# Patient Record
Sex: FEMALE | Race: WHITE | NOT HISPANIC OR LATINO | Employment: FULL TIME | URBAN - METROPOLITAN AREA
[De-identification: names, ages, dates, MRNs, and addresses within clinical notes are randomized per-mention and may not be internally consistent; named-entity substitution may affect disease eponyms.]

---

## 2017-02-05 ENCOUNTER — OFFICE VISIT (OUTPATIENT)
Dept: URGENT CARE | Facility: CLINIC | Age: 22
End: 2017-02-05
Payer: COMMERCIAL

## 2017-02-05 VITALS
BODY MASS INDEX: 28.07 KG/M2 | WEIGHT: 143 LBS | TEMPERATURE: 96.2 F | RESPIRATION RATE: 12 BRPM | HEART RATE: 89 BPM | HEIGHT: 60 IN | OXYGEN SATURATION: 96 % | SYSTOLIC BLOOD PRESSURE: 110 MMHG | DIASTOLIC BLOOD PRESSURE: 80 MMHG

## 2017-02-05 DIAGNOSIS — R10.84 GENERALIZED ABDOMINAL PAIN: ICD-10-CM

## 2017-02-05 DIAGNOSIS — K59.01 SLOW TRANSIT CONSTIPATION: ICD-10-CM

## 2017-02-05 DIAGNOSIS — R11.0 NAUSEA: ICD-10-CM

## 2017-02-05 LAB
APPEARANCE UR: CLEAR
BILIRUB UR STRIP-MCNC: NORMAL MG/DL
COLOR UR AUTO: YELLOW
GLUCOSE UR STRIP.AUTO-MCNC: NORMAL MG/DL
KETONES UR STRIP.AUTO-MCNC: NORMAL MG/DL
LEUKOCYTE ESTERASE UR QL STRIP.AUTO: NORMAL
NITRITE UR QL STRIP.AUTO: NORMAL
PH UR STRIP.AUTO: 6 [PH] (ref 5–8)
PROT UR QL STRIP: NORMAL MG/DL
RBC UR QL AUTO: NORMAL
SP GR UR STRIP.AUTO: 1.02
UROBILINOGEN UR STRIP-MCNC: 0.2 MG/DL

## 2017-02-05 PROCEDURE — 81002 URINALYSIS NONAUTO W/O SCOPE: CPT | Performed by: NURSE PRACTITIONER

## 2017-02-05 PROCEDURE — 99214 OFFICE O/P EST MOD 30 MIN: CPT | Performed by: NURSE PRACTITIONER

## 2017-02-05 RX ORDER — METOCLOPRAMIDE 10 MG/1
10 TABLET ORAL
Qty: 15 TAB | Refills: 0 | Status: SHIPPED | OUTPATIENT
Start: 2017-02-05 | End: 2019-12-20

## 2017-02-05 ASSESSMENT — ENCOUNTER SYMPTOMS
DIARRHEA: 0
NUMBER OF EPISODES OF EMESIS TODAY: 1
ABDOMINAL PAIN: 1
HEADACHES: 0
WEIGHT LOSS: 0
ANOREXIA: 0
VOMITING: 1
FEVER: 0
FLATUS: 0
MYALGIAS: 0
NAUSEA: 1
BELCHING: 0
HEMATOCHEZIA: 0
CONSTIPATION: 1

## 2017-02-05 ASSESSMENT — PAIN SCALES - GENERAL: PAINLEVEL: 6=MODERATE PAIN

## 2017-02-05 NOTE — MR AVS SNAPSHOT
Kaidenfarzadlaureen Beata MahinRaquel   2017 9:25 AM   Office Visit   MRN: [id number]    Department:  [facility]   Dept Phone:  [phone]    Description:  Female : 1995   Provider:  JONAS Rader           Reason for Visit     Abdominal Pain x 1 week / lower abn pain    Emesis x 1 day / w/ blood      Allergies as of 2017     No Known Allergies      You were diagnosed with     Slow transit constipation   [564.01.ICD-9-CM]       Nausea   [413413]       Generalized abdominal pain   [152227]         Vital Signs     Blood Pressure Pulse Temperature Respirations Height Weight    110/80 mmHg 89 35.7 °C (96.2 °F) 12 1.524 m (5') 64.864 kg (143 lb)    Body Mass Index Oxygen Saturation Last Menstrual Period Breastfeeding? Smoking Status       27.93 kg/m2 96% 2017 No Never Smoker        Basic Information     Date Of Birth Sex Race Ethnicity Preferred Language    1995 Female White,  or  Non- English      Problem List              ICD-10-CM Priority Class Noted - Resolved    Intractable migraine without status migrainosus G43.919   2015 - Present    History of colonoscopy Z98.890   2015 - Present    Depression F32.9   2015 - Present    History of self-harm Z91.5   2015 - Present    Marijuana use F12.10   2015 - Present      Health Maintenance        Date Due Completion Dates    IMM INFLUENZA (1) 2017 (Originally 2016) 10/23/2015 (Declined)    Override on 10/23/2015: Patient Declined    PAP SMEAR 2018    IMM DTaP/Tdap/Td Vaccine (6 - Td) 2021, 5/10/2006, 1996, 1995, 1995, 1995            Results     POCT Urinalysis      Component Value Standard Range & Units    POC Color yellow Negative    POC Appearance clear Negative    POC Leukocyte Esterase neg Negative    POC Nitrites neg Negative    POC Urobiligen 0.2 Negative (0.2) mg/dL    POC Protein neg Negative mg/dL    POC  Urine PH 6.0 5.0 - 8.0    POC Blood sm Negative    POC Specific Gravity 1.020 <1.005 - >1.030    POC Ketones tr Negative mg/dL    POC Biliruben neg Negative mg/dL    POC Glucose neg Negative mg/dL                        Current Immunizations     Dtap Vaccine 1/30/1996, 4/24/1995, 2/22/1995, 1994    HIB Vaccine (ACTHIB/HIBERIX) 1/30/1996, 4/24/1995, 2/22/1995, 1994    HPV Quadrivalent Vaccine (GARDASIL) 8/23/2011, 7/17/2007, 5/22/2007    Hepatitis A Vaccine, Ped/Adol 3/7/2007, 4/25/1999    Hepatitis B Vaccine Recombivax (Adol/Adult) 11/17/2006, 7/25/1995, 1994    IPV 11/17/2006    MMR Vaccine 11/17/2006, 10/28/1995    Meningococcal Conjugate Vaccine MCV4 (Menveo) 8/23/2011    OPV - Historical Data 4/24/1995, 2/22/1995, 1994    TD Vaccine 5/10/2006    Tdap Vaccine 8/23/2011    Varicella Vaccine Live 7/17/2007, 11/17/2006      Below and/or attached are the medications your provider expects you to take. Review all of your home medications and newly ordered medications with your provider and/or pharmacist. Follow medication instructions as directed by your provider and/or pharmacist. Please keep your medication list with you and share with your provider. Update the information when medications are discontinued, doses are changed, or new medications (including over-the-counter products) are added; and carry medication information at all times in the event of emergency situations     Allergies:  No Known Allergies          Medications  Valid as of: February 05, 2017 - 11:42 AM    Generic Name Brand Name Tablet Size Instructions for use    FLUoxetine HCl (Cap) PROZAC 20 MG TAKE 1 CAPSULE BY MOUTH EVERY DAY        Levonorgestrel-Ethinyl Estrad (Tab) AVIANE, ALEJOVANE, LESSINA 0.1-20 MG-MCG Take 1 Tab by mouth every day.        Metoclopramide HCl (Tab) REGLAN 10 MG Take 1 Tab by mouth 3 times a day before meals.        .                 Medicines prescribed today were sent to:     [facility]  [id number] - [place], [place] - [address] AT [facility] [address]    [address] [place] [address]    Phone: [phone] Fax: [phone]    Open 24 Hours?: No    [id number] - [place], [place] - [address] AT [facility] [address]    [address] [facility] [address] [place] [address]    Phone: [phone] Fax: [phone]    Open 24 Hours?: Yes      Medication refill instructions:       If your prescription bottle indicates you have medication refills left, it is not necessary to call your provider’s office. Please contact your pharmacy and they will refill your medication.    If your prescription bottle indicates you do not have any refills left, you may request refills at any time through one of the following ways: The online OpenZine system (except Urgent Care), by calling your provider’s office, or by asking your pharmacy to contact your provider’s office with a refill request. Medication refills are processed only during regular business hours and may not be available until the next business day. Your provider may request additional information or to have a follow-up visit with you prior to refilling your medication.   *Please Note: Medication refills are assigned a new Rx number when refilled electronically. Your pharmacy may indicate that no refills were authorized even though a new prescription for the same medication is available at the pharmacy. Please request the medicine by name with the pharmacy before contacting your provider for a refill.           Friendferhart Status: Patient Declined

## 2017-02-05 NOTE — PROGRESS NOTES
Subjective:      Sulema Castorena is a 21 y.o. female who presents with Abdominal Pain and Emesis    Chief Complaint   Patient presents with   • Abdominal Pain     x 1 week / lower abn pain   • Emesis     x 1 day / w/ blood       Denies past medical, surgical or family history that is significant to today's problem.   RX or OTC medications reviewed with patient today.   No Known Allergies      LMP: 1/28/17 and completed 2 days ago.   Lutera OCP    Abdominal Pain  This is a new problem. The current episode started in the past 7 days. The onset quality is gradual. The problem occurs intermittently. The problem has been unchanged. The pain is located in the generalized abdominal region. The pain is at a severity of 3/10. The pain is mild. The quality of the pain is colicky, aching and a sensation of fullness. The abdominal pain does not radiate. Associated symptoms include constipation, nausea and vomiting (Twice with blood tinged mucous). Pertinent negatives include no anorexia, belching, diarrhea, dysuria, fever, flatus, frequency, headaches, hematochezia, hematuria, melena, myalgias or weight loss. Nothing aggravates the pain. The pain is relieved by nothing. Treatments tried: laxative last night without effect at this time. The treatment provided no relief. There is no history of abdominal surgery, gallstones or pancreatitis. history of previous GI problems she had a colonoscopy and endoscopy as a teenager that was normal. She has has chronic intermittent, constipation   Emesis  Associated symptoms include abdominal pain, nausea and vomiting (Twice with blood tinged mucous). Pertinent negatives include no anorexia, fever, headaches or myalgias.       Review of Systems   Constitutional: Negative for fever and weight loss.   Gastrointestinal: Positive for nausea, vomiting (Twice with blood tinged mucous), abdominal pain and constipation. Negative for diarrhea, melena, hematochezia, anorexia and flatus.    Genitourinary: Negative for dysuria, frequency and hematuria.   Musculoskeletal: Negative for myalgias.   Neurological: Negative for headaches.          Objective:     /80 mmHg  Pulse 89  Temp(Src) 35.7 °C (96.2 °F)  Resp 12  Ht 1.524 m (5')  Wt 64.864 kg (143 lb)  BMI 27.93 kg/m2  SpO2 96%  LMP 02/03/2017  Breastfeeding? No     Physical Exam   Constitutional: She is oriented to person, place, and time. She appears well-developed and well-nourished.   HENT:   Head: Normocephalic.   Mouth/Throat: Uvula is midline and mucous membranes are normal. Posterior oropharyngeal erythema present.   Eyes: Pupils are equal, round, and reactive to light.   Neck: Normal range of motion.   Cardiovascular: Normal rate, regular rhythm and normal heart sounds.    Pulmonary/Chest: Effort normal and breath sounds normal. No respiratory distress.   Abdominal: Normal appearance. She exhibits no distension, no pulsatile liver, no fluid wave, no pulsatile midline mass and no mass. Bowel sounds are decreased. There is no hepatosplenomegaly. There is tenderness in the epigastric area, left upper quadrant and left lower quadrant. There is no rigidity, no rebound, no guarding, no CVA tenderness, no tenderness at McBurney's point and negative Tidwell's sign. No hernia. Hernia confirmed negative in the ventral area.       Musculoskeletal: Normal range of motion.   Neurological: She is alert and oriented to person, place, and time.   Skin: Skin is warm, dry and intact.   Psychiatric: She has a normal mood and affect. Her behavior is normal. Judgment and thought content normal.   Nursing note and vitals reviewed.         UA:  Negative       Assessment/Plan:       1. Slow transit constipation  metoclopramide (REGLAN) 10 MG Tab    POCT Urinalysis   2. Nausea  metoclopramide (REGLAN) 10 MG Tab    POCT Urinalysis   3. Generalized abdominal pain         Restart Miralax OTC Dosage and directions per   Keep well hydrated.    [place] easy to digest diet until symptoms resolve then high fiber diet.   Return to clinic or PCP 2-3 days if current symptoms are not resolving in a satisfactory manner or sooner if new or worsening symptoms occur.   Patient and or family advised differential diagnoses, signs and symptoms which would warrant Emergency Department evaluation.  Verbalizes understanding of instructions.   Pt education done. Aftercare instructions given to pt/ caregiver. Questions answered. Verbalizes good understanding.   Educated in proper administration of medication(s) ordered today including safety, possible SE, risks, benefits, rationale and alternatives to therapy.

## 2017-04-13 ENCOUNTER — OFFICE VISIT (OUTPATIENT)
Dept: MEDICAL GROUP | Facility: MEDICAL CENTER | Age: 22
End: 2017-04-13
Payer: COMMERCIAL

## 2017-04-13 VITALS
WEIGHT: 140 LBS | DIASTOLIC BLOOD PRESSURE: 64 MMHG | BODY MASS INDEX: 27.48 KG/M2 | TEMPERATURE: 99 F | SYSTOLIC BLOOD PRESSURE: 118 MMHG | HEART RATE: 102 BPM | OXYGEN SATURATION: 98 % | HEIGHT: 60 IN

## 2017-04-13 DIAGNOSIS — F51.01 PRIMARY INSOMNIA: ICD-10-CM

## 2017-04-13 DIAGNOSIS — F32.A ANXIETY AND DEPRESSION: ICD-10-CM

## 2017-04-13 DIAGNOSIS — F12.90 MARIJUANA USE: ICD-10-CM

## 2017-04-13 DIAGNOSIS — F41.9 ANXIETY AND DEPRESSION: ICD-10-CM

## 2017-04-13 PROCEDURE — 99214 OFFICE O/P EST MOD 30 MIN: CPT | Performed by: NURSE PRACTITIONER

## 2017-04-13 RX ORDER — ESCITALOPRAM OXALATE 10 MG/1
10 TABLET ORAL DAILY
Qty: 30 TAB | Refills: 1 | Status: SHIPPED | OUTPATIENT
Start: 2017-04-13 | End: 2017-06-11 | Stop reason: SDUPTHER

## 2017-04-13 RX ORDER — TRAZODONE HYDROCHLORIDE 50 MG/1
50 TABLET ORAL
Qty: 30 TAB | Refills: 1 | Status: SHIPPED | OUTPATIENT
Start: 2017-04-13 | End: 2017-06-10 | Stop reason: SDUPTHER

## 2017-04-13 ASSESSMENT — PATIENT HEALTH QUESTIONNAIRE - PHQ9
5. POOR APPETITE OR OVEREATING: 3 - NEARLY EVERY DAY
CLINICAL INTERPRETATION OF PHQ2 SCORE: 5
SUM OF ALL RESPONSES TO PHQ QUESTIONS 1-9: 23

## 2017-04-13 NOTE — MR AVS SNAPSHOT
Sulema Castorena   2017 4:00 PM   Office Visit   MRN: [id number]    Department:  [facility]   Dept Phone:  [phone]    Description:  Female : 1995   Provider:  ROSAURA Driver           Reason for Visit     Follow-Up depression meds       Allergies as of 2017     No Known Allergies      You were diagnosed with     Anxiety and depression   [325373]       Primary insomnia   [832407]         Vital Signs     Blood Pressure Pulse Temperature Height Weight Body Mass Index    118/64 mmHg 102 37.2 °C (99 °F) 1.524 m (5') 63.504 kg (140 lb) 27.34 kg/m2    Oxygen Saturation Smoking Status                98% Never Smoker           Basic Information     Date Of Birth Sex Race Ethnicity Preferred Language    1995 Female White,  or  Non- English      Problem List              ICD-10-CM Priority Class Noted - Resolved    Intractable migraine without status migrainosus G43.919   2015 - Present    History of colonoscopy Z98.890   2015 - Present    Anxiety and depression F41.9, F32.9   2015 - Present    History of self-harm Z91.5   2015 - Present    Marijuana use F12.10   2015 - Present      Health Maintenance        Date Due Completion Dates    PAP SMEAR 2018    IMM DTaP/Tdap/Td Vaccine (6 - Td) 2021, 5/10/2006, 1996, 1995, 1995, 1994            Current Immunizations     Dtap Vaccine 1996, 1995, 1995, 1994    HIB Vaccine (ACTHIB/HIBERIX) 1996, 1995, 1995, 1994    HPV Quadrivalent Vaccine (GARDASIL) 2011, 2007, 2007    Hepatitis A Vaccine, Ped/Adol 3/7/2007, 1999    Hepatitis B Vaccine Recombivax (Adol/Adult) 2006, 1995, 1994    IPV 2006    MMR Vaccine 2006, 10/28/1995    Meningococcal Conjugate Vaccine MCV4 (Menveo) 2011    OPV - Historical Data 1995, 1995, 1994    TD Vaccine 5/10/2006    Tdap Vaccine 8/23/2011    Varicella Vaccine Live 7/17/2007, 11/17/2006      Below and/or attached are the medications your provider expects you to take. Review all of your home medications and newly ordered medications with your provider and/or pharmacist. Follow medication instructions as directed by your provider and/or pharmacist. Please keep your medication list with you and share with your provider. Update the information when medications are discontinued, doses are changed, or new medications (including over-the-counter products) are added; and carry medication information at all times in the event of emergency situations     Allergies:  No Known Allergies          Medications  Valid as of: April 13, 2017 -  4:27 PM    Generic Name Brand Name Tablet Size Instructions for use    Escitalopram Oxalate (Tab) LEXAPRO 10 MG Take 1 Tab by mouth every day.        FLUoxetine HCl (Cap) PROZAC 20 MG TAKE 1 CAPSULE BY MOUTH EVERY DAY        Levonorgestrel-Ethinyl Estrad (Tab) AVIANE, ALESSE, LESSINA 0.1-20 MG-MCG Take 1 Tab by mouth every day.        Metoclopramide HCl (Tab) REGLAN 10 MG Take 1 Tab by mouth 3 times a day before meals.        TraZODone HCl (Tab) DESYREL 50 MG Take 1 Tab by mouth at bedtime as needed for Sleep.        .                 Medicines prescribed today were sent to:     [facility] [address] [place], [place] - [address] AT [facility] [address]    [address] [facility] [address]    Phone: [phone] Fax: [phone]    Open 24 Hours?: No    [facility] [address], [place] - [address] AT [facility] [address]    [address] [facility] [address]    Phone: [phone] Fax: [phone]    Open 24 Hours?: Yes      Medication refill instructions:       If your prescription bottle indicates you have medication refills left, it is not necessary to call your provider’s office. Please contact your pharmacy and they will refill your medication.     If your prescription bottle indicates you do not have any refills left, you may request refills at any time through one of the following ways: The online RealGravity system (except Urgent Care), by calling your provider’s office, or by asking your pharmacy to contact your provider’s office with a refill request. Medication refills are processed only during regular business hours and may not be available until the next business day. Your provider may request additional information or to have a follow-up visit with you prior to refilling your medication.   *Please Note: Medication refills are assigned a new Rx number when refilled electronically. Your pharmacy may indicate that no refills were authorized even though a new prescription for the same medication is available at the pharmacy. Please request the medicine by name with the pharmacy before contacting your provider for a refill.           RealGravity Access Code: [email or web address]  Expires: 5/13/2017  4:20 PM    RealGravity  A secure, online tool to manage your health information     [employer]’s RealGravity® is a secure, online tool that connects you to your personalized health information from the privacy of your home -- day or night - making it very easy for you to manage your healthcare. Once the activation process is completed, you can even access your medical information using the RealGravity emma, which is available for free in the Apple Emma store or Google Play store.     RealGravity provides the following levels of access (as shown below):   My Chart Features   [facility] Doctor [facility]  Specialists [facility]  Urgent  Care [facility]  Primary Care  Doctor   Email your healthcare team securely and privately 24/7 X X X    Manage appointments: schedule your next appointment; view details of past/upcoming appointments X      Request prescription refills. X      View recent personal medical records, including lab and immunizations X X X X   View health record, including  health history, allergies, medications X X X X   Read reports about your outpatient visits, procedures, consult and ER notes X X X X   See your discharge summary, which is a recap of your hospital and/or ER visit that includes your diagnosis, lab results, and care plan. X X       How to register for Transgenomic:  1. Go to  [email or web address].  2. Click on the Sign Up Now box, which takes you to the New Member Sign Up page. You will need to provide the following information:  a. Enter your Transgenomic Access Code exactly as it appears at the top of this page. (You will not need to use this code after you’ve completed the sign-up process. If you do not sign up before the expiration date, you must request a new code.)   b. Enter your date of birth.   c. Enter your home email address.   d. Click Submit, and follow the next screen’s instructions.  3. Create a Transgenomic ID. This will be your Transgenomic login ID and cannot be changed, so think of one that is secure and easy to remember.  4. Create a [employer] Softwaret password. You can change your password at any time.  5. Enter your Password Reset Question and Answer. This can be used at a later time if you forget your password.   6. Enter your e-mail address. This allows you to receive e-mail notifications when new information is available in Transgenomic.  7. Click Sign Up. You can now view your health information.    For assistance activating your Transgenomic account, call [phone]

## 2017-04-14 ENCOUNTER — TELEPHONE (OUTPATIENT)
Dept: MEDICAL GROUP | Facility: MEDICAL CENTER | Age: 22
End: 2017-04-14

## 2017-04-14 NOTE — PROGRESS NOTES
Subjective:     Chief Complaint   Patient presents with   • Follow-Up     depression shahla Leone Beata Castorena is a 21 y.o. female here today to follow up on:    Anxiety and depression  On prozac at one point. Began feeling it was making her mood worse, discontinued several months ago  Now with significant increase in depression and anxiety. Not sleeping, no appetite, frequently worried and tearful. No self injury, no suicidal intent  Depression Screening    Little interest or pleasure in doing things?  3 - nearly every day  Feeling down, depressed , or hopeless? 2 - more than half the days  Trouble falling or staying asleep, or sleeping too much?  2 - more than half the days  Feeling tired or having little energy?  3 - nearly every day  Poor appetite or overeating?  3 - nearly every day  Feeling bad about yourself - or that you are a failure or have let yourself or your family down? 3 - nearly every day  Trouble concentrating on things, such as reading the newspaper or watching television? 3 - nearly every day  Moving or speaking so slowly that other people could have noticed.  Or the opposite - being so fidgety or restless that you have been moving around a lot more than usual?  2 - more than half the days  Thoughts that you would be better off dead, or of hurting yourself?  2 - more than half the days  Patient Health Questionnaire Score: 23      If depressive symptoms identified deferred to follow up visit unless specifically addressed in assesment and plan.      Interpretation of PHQ-9 Total Score   Score Severity   1-4 Minimal Depression   5-9 Mild Depression   10-14 Moderate Depression   15-19 Moderately Severe Depression   20-27 Severe Depression      Marijuana use  Continues with daily marijuana use       Current medicines (including changes today)  Current Outpatient Prescriptions   Medication Sig Dispense Refill   • escitalopram (LEXAPRO) 10 MG Tab Take 1 Tab by mouth every day. 30 Tab 1   •  trazodone (DESYREL) 50 MG Tab Take 1 Tab by mouth at bedtime as needed for Sleep. 30 Tab 1   • levonorgestrel-ethinyl estradiol (LUTERA) 0.1-20 MG-MCG per tablet Take 1 Tab by mouth every day. 28 Tab 11   • metoclopramide (REGLAN) 10 MG Tab Take 1 Tab by mouth 3 times a day before meals. 15 Tab 0   • fluoxetine (PROZAC) 20 MG Cap TAKE 1 CAPSULE BY MOUTH EVERY DAY 30 Cap 2     No current facility-administered medications for this visit.     She  has a past medical history of Depression. She also has no past medical history of Diabetes or ASTHMA.    ROS included above     Objective:     Blood pressure 118/64, pulse 102, temperature 37.2 °C (99 °F), height 1.524 m (5'), weight 63.504 kg (140 lb), SpO2 98 %. Body mass index is 27.34 kg/(m^2).     Physical Exam:  General: Alert, oriented in no acute distress.  Eye contact is good, speech is normal, affect calm  Lungs: clear to auscultation bilaterally, normal effort, no wheeze/ rhonchi/ rales.  CV: regular rate and rhythm, S1, S2, no murmur  Abdomen: soft, nontender  Ext: no edema, color normal, vascularity normal, temperature normal    Assessment and Plan:   The following treatment plan was discussed   1. Anxiety and depression  Previously on prozac, felt her mood was worsening and discontinued. Now with increased symptoms. Start:  escitalopram (LEXAPRO) 10 MG Tab  Risks, benefits, SE reviewed, Discussed need for sustained treatment to obtain results. Enc counseling, she is unsure she can afford the copays. Seek emergency treatment for worsening depression or suicidal ideation    Patient has been identified as being depressed and appropriate orders and counseling have been given   2. Primary insomnia  Start trial of:  trazodone (DESYREL) 50 MG Tab   3. Marijuana use  Enc cessation. discussed marijuana use can worsen anxiety/ depression       Followup: Return in about 1 month (around 5/13/2017).         Please note that this dictation was created using voice recognition  software. I have worked with consultants from the vendor as well as technical experts from [facility] to optimize the interface. I have made every reasonable attempt to correct obvious errors, but I expect that there are errors of grammar and possibly content that I did not discover before finalizing the note.

## 2017-04-14 NOTE — TELEPHONE ENCOUNTER
These medications are frequently used together and have a very low risk of causing problem. It is more likely that her symptoms are anxiety related. However, if she is worried about continuing both I would recommend continuing the Lexapro and using over-the-counter Benadryl to help with sleep if needed.

## 2017-04-14 NOTE — ASSESSMENT & PLAN NOTE
On prozac at one point. Began feeling it was making her mood worse, discontinued several months ago  Now with significant increase in depression and anxiety. Not sleeping, no appetite, frequently worried and tearful. No self injury, no suicidal intent  Depression Screening    Little interest or pleasure in doing things?  3 - nearly every day  Feeling down, depressed , or hopeless? 2 - more than half the days  Trouble falling or staying asleep, or sleeping too much?  2 - more than half the days  Feeling tired or having little energy?  3 - nearly every day  Poor appetite or overeating?  3 - nearly every day  Feeling bad about yourself - or that you are a failure or have let yourself or your family down? 3 - nearly every day  Trouble concentrating on things, such as reading the newspaper or watching television? 3 - nearly every day  Moving or speaking so slowly that other people could have noticed.  Or the opposite - being so fidgety or restless that you have been moving around a lot more than usual?  2 - more than half the days  Thoughts that you would be better off dead, or of hurting yourself?  2 - more than half the days  Patient Health Questionnaire Score: 23      If depressive symptoms identified deferred to follow up visit unless specifically addressed in assesment and plan.      Interpretation of PHQ-9 Total Score   Score Severity   1-4 Minimal Depression   5-9 Mild Depression   10-14 Moderate Depression   15-19 Moderately Severe Depression   20-27 Severe Depression

## 2017-04-14 NOTE — TELEPHONE ENCOUNTER
1. Caller Name: Sulema StocktonLeidaJorgito                                         Call Back Number: [phone]      Patient approves a detailed voicemail message: yes    Pt called and states she took both of her medications last night before bed. Pt woke up this morning in hot flash type sweats and a racing heart. Pt is worried about taking these together after doing research on the intranet and would like advise.

## 2017-04-15 NOTE — TELEPHONE ENCOUNTER
Spoke to Pt, she is going to try to take half of the Trazodone to get use to it and continue with the Lexapro as planned. Will notify office if any further issues or concerns.

## 2017-05-07 ENCOUNTER — OFFICE VISIT (OUTPATIENT)
Dept: URGENT CARE | Facility: CLINIC | Age: 22
End: 2017-05-07
Payer: COMMERCIAL

## 2017-05-07 DIAGNOSIS — T26.42XA: ICD-10-CM

## 2017-05-07 DIAGNOSIS — H01.004 BLEPHARITIS OF LEFT UPPER EYELID, UNSPECIFIED TYPE: ICD-10-CM

## 2017-05-07 PROCEDURE — 99214 OFFICE O/P EST MOD 30 MIN: CPT | Performed by: PHYSICIAN ASSISTANT

## 2017-05-07 RX ORDER — PREDNISONE 20 MG/1
20 TABLET ORAL DAILY
Qty: 4 TAB | Refills: 0 | Status: SHIPPED | OUTPATIENT
Start: 2017-05-07 | End: 2017-05-11

## 2017-05-07 RX ORDER — SULFAMETHOXAZOLE AND TRIMETHOPRIM 800; 160 MG/1; MG/1
1 TABLET ORAL 2 TIMES DAILY
Qty: 14 TAB | Refills: 0 | Status: SHIPPED | OUTPATIENT
Start: 2017-05-07 | End: 2017-05-14

## 2017-05-07 ASSESSMENT — ENCOUNTER SYMPTOMS
DOUBLE VISION: 0
BLURRED VISION: 0
EYE REDNESS: 0
EYE BURN: 1
EYE DISCHARGE: 0
FOREIGN BODY SENSATION: 0
CONSTITUTIONAL NEGATIVE: 1
EYE PAIN: 1
PHOTOPHOBIA: 0

## 2017-05-07 NOTE — MR AVS SNAPSHOT
Kaidenfarzadlaureen Beata StocktonLeidaBull   2017 10:15 AM   Office Visit   MRN: [id number]    Department:  [facility]   Dept Phone:  [phone]    Description:  Female : 1995   Provider:  Cyrus Ruiz PA-C           Reason for Visit     Eye Burn with wax      Allergies as of 2017     No Known Allergies      You were diagnosed with     Blepharitis of left upper eyelid, unspecified type   [2418042]       Burn of eye, first degree, left, initial encounter   [[id number]]         Vital Signs     Smoking Status                   Never Smoker            Basic Information     Date Of Birth Sex Race Ethnicity Preferred Language    1995 Female White,  or  Non- English      Your appointments     May 12, 2017 11:00 AM   Established Patient with ROSAURA Driver   [facility] ([facility])    [address]  [facility] [address] [phone]           You will be receiving a confirmation call a few days before your appointment from our automated call confirmation system.              Problem List              ICD-10-CM Priority Class Noted - Resolved    Intractable migraine without status migrainosus G43.919   2015 - Present    History of colonoscopy Z98.890   2015 - Present    Anxiety and depression F41.9, F32.9   2015 - Present    History of self-harm Z91.5   2015 - Present    Marijuana use F12.10   2015 - Present      Health Maintenance        Date Due Completion Dates    PAP SMEAR 2018    IMM DTaP/Tdap/Td Vaccine (6 - Td) 2021, 5/10/2006, 1996, 1995, 1995, 1994            Current Immunizations     Dtap Vaccine 1996, 1995, 1995, 1994    HIB Vaccine (ACTHIB/HIBERIX) 1996, 1995, 1995, 1994    HPV Quadrivalent Vaccine (GARDASIL) 2011, 2007, 2007    Hepatitis A Vaccine, Ped/Adol 3/7/2007, 1999    Hepatitis B  Vaccine Recombivax (Adol/Adult) 11/17/2006, 7/25/1995, 1994    IPV 11/17/2006    MMR Vaccine 11/17/2006, 10/28/1995    Meningococcal Conjugate Vaccine MCV4 (Menveo) 8/23/2011    OPV - Historical Data 4/24/1995, 2/22/1995, 1994    TD Vaccine 5/10/2006    Tdap Vaccine 8/23/2011    Varicella Vaccine Live 7/17/2007, 11/17/2006      Below and/or attached are the medications your provider expects you to take. Review all of your home medications and newly ordered medications with your provider and/or pharmacist. Follow medication instructions as directed by your provider and/or pharmacist. Please keep your medication list with you and share with your provider. Update the information when medications are discontinued, doses are changed, or new medications (including over-the-counter products) are added; and carry medication information at all times in the event of emergency situations     Allergies:  No Known Allergies          Medications  Valid as of: May 07, 2017 - 11:26 AM    Generic Name Brand Name Tablet Size Instructions for use    Escitalopram Oxalate (Tab) LEXAPRO 10 MG Take 1 Tab by mouth every day.        FLUoxetine HCl (Cap) PROZAC 20 MG TAKE 1 CAPSULE BY MOUTH EVERY DAY        Levonorgestrel-Ethinyl Estrad (Tab) GLENN LANE LESSINA 0.1-20 MG-MCG Take 1 Tab by mouth every day.        Metoclopramide HCl (Tab) REGLAN 10 MG Take 1 Tab by mouth 3 times a day before meals.        PredniSONE (Tab) DELTASONE 20 MG Take 1 Tab by mouth every day for 4 days.        Silver Sulfadiazine (Cream) SILVADENE 1 % Apply to skin burn area bid        Sulfamethoxazole-Trimethoprim (Tab) BACTRIM -160 MG Take 1 Tab by mouth 2 times a day for 7 days.        TraZODone HCl (Tab) DESYREL 50 MG Take 1 Tab by mouth at bedtime as needed for Sleep.        .                 Medicines prescribed today were sent to:     [id number] - [place], [place] - [address] [facility]    [address]  [place] [address]    Phone: [phone] Fax: [phone]    Open 24 Hours?: Yes      Medication refill instructions:       If your prescription bottle indicates you have medication refills left, it is not necessary to call your provider’s office. Please contact your pharmacy and they will refill your medication.    If your prescription bottle indicates you do not have any refills left, you may request refills at any time through one of the following ways: The online Telanetix system (except Urgent Care), by calling your provider’s office, or by asking your pharmacy to contact your provider’s office with a refill request. Medication refills are processed only during regular business hours and may not be available until the next business day. Your provider may request additional information or to have a follow-up visit with you prior to refilling your medication.   *Please Note: Medication refills are assigned a new Rx number when refilled electronically. Your pharmacy may indicate that no refills were authorized even though a new prescription for the same medication is available at the pharmacy. Please request the medicine by name with the pharmacy before contacting your provider for a refill.           Telanetix Access Code: [email or web address]  Expires: 5/13/2017  4:20 PM    Telanetix  A secure, online tool to manage your health information     [employer]’s Telanetix® is a secure, online tool that connects you to your personalized health information from the privacy of your home -- day or night - making it very easy for you to manage your healthcare. Once the activation process is completed, you can even access your medical information using the Telanetix emma, which is available for free in the Apple Emma store or Google Play store.     Telanetix provides the following levels of access (as shown below):   My Chart Features   [facility] Doctor [facility]  Specialists [facility]  Urgent  Care Non-[facility]  Primary Care  Doctor    Email your healthcare team securely and privately 24/7 X X X    Manage appointments: schedule your next appointment; view details of past/upcoming appointments X      Request prescription refills. X      View recent personal medical records, including lab and immunizations X X X X   View health record, including health history, allergies, medications X X X X   Read reports about your outpatient visits, procedures, consult and ER notes X X X X   See your discharge summary, which is a recap of your hospital and/or ER visit that includes your diagnosis, lab results, and care plan. X X       How to register for [employer]:  1. Go to  [email or web address].  2. Click on the Sign Up Now box, which takes you to the New Member Sign Up page. You will need to provide the following information:  a. Enter your [employer] Access Code exactly as it appears at the top of this page. (You will not need to use this code after you’ve completed the sign-up process. If you do not sign up before the expiration date, you must request a new code.)   b. Enter your date of birth.   c. Enter your home email address.   d. Click Submit, and follow the next screen’s instructions.  3. Create a Intention Technology ID. This will be your [employer]ention [employer] login ID and cannot be changed, so think of one that is secure and easy to remember.  4. Create a [employer] password. You can change your password at any time.  5. Enter your Password Reset Question and Answer. This can be used at a later time if you forget your password.   6. Enter your e-mail address. This allows you to receive e-mail notifications when new information is available in [employer].  7. Click Sign Up. You can now view your health information.    For assistance activating your [employer] account, call [phone]

## 2017-05-07 NOTE — Clinical Note
May 7, 2017         Patient: Sulema Castorena   YOB: 1995   Date of Visit: 5/7/2017           To Whom it May Concern:    Sulema Castorena was seen in my clinic on 5/7/2017 ; please excuse Sunday, Monday.     If you have any questions or concerns, please don't hesitate to call.        Sincerely,           Cyrus Ruiz PA-C  Electronically Signed

## 2017-05-07 NOTE — PROGRESS NOTES
Subjective:      Sulema Castorena is a 21 y.o. female who presents with Eye Burn            Eye Burn   The left eye is affected. This is a new problem. The current episode started yesterday (hot wax to L eyelid area; ?eye pn;irrit; no vis probs). The problem occurs constantly. The problem has been unchanged. The injury mechanism was a chemical exposure. The pain is mild. Pertinent negatives include no blurred vision, eye discharge, double vision, eye redness, foreign body sensation or photophobia. She has tried water for the symptoms. The treatment provided moderate relief.       Review of Systems   Constitutional: Negative.    HENT: Negative.    Eyes: Positive for pain. Negative for blurred vision, double vision, photophobia, discharge and redness.   Skin: Negative.           Objective:     There were no vitals taken for this visit.     Physical Exam   Constitutional: She is oriented to person, place, and time. She appears well-developed and well-nourished. No distress.   HENT:   Head: Normocephalic and atraumatic.   Eyes: Conjunctivae and EOM are normal. Pupils are equal, round, and reactive to light.   OS upper lid redn/swell/tend  Fluoro stain neg.   Neck: Normal range of motion. Neck supple.   Neurological: She is alert and oriented to person, place, and time.   Skin: Skin is warm and dry. There is erythema.   Psychiatric: She has a normal mood and affect. Her behavior is normal. Judgment and thought content normal.   Nursing note and vitals reviewed.  There were no vitals filed for this visit.  Active Ambulatory Problems     Diagnosis Date Noted   • Intractable migraine without status migrainosus 08/11/2015   • History of colonoscopy 09/17/2015   • Anxiety and depression 09/17/2015   • History of self-harm 09/17/2015   • Marijuana use 09/17/2015     Resolved Ambulatory Problems     Diagnosis Date Noted   • Cellulitis of breast 09/13/2014   • Chest pain 09/13/2014   • Tachycardia 09/14/2014     Past  Medical History   Diagnosis Date   • Depression      Current Outpatient Prescriptions on File Prior to Visit   Medication Sig Dispense Refill   • escitalopram (LEXAPRO) 10 MG Tab Take 1 Tab by mouth every day. 30 Tab 1   • trazodone (DESYREL) 50 MG Tab Take 1 Tab by mouth at bedtime as needed for Sleep. 30 Tab 1   • levonorgestrel-ethinyl estradiol (LUTERA) 0.1-20 MG-MCG per tablet Take 1 Tab by mouth every day. 28 Tab 11   • metoclopramide (REGLAN) 10 MG Tab Take 1 Tab by mouth 3 times a day before meals. 15 Tab 0   • fluoxetine (PROZAC) 20 MG Cap TAKE 1 CAPSULE BY MOUTH EVERY DAY 30 Cap 2     No current facility-administered medications on file prior to visit.     Gargles, Cepacol lozenges, Aleve/Advil as needed for throat pain  Family History   Problem Relation Age of Onset   • Alcohol/Drug Father    • Alcohol/Drug Brother      Review of patient's allergies indicates no known allergies.              Assessment/Plan:     ·  OS blepharitis, 1st dg burn      · Pred; bactrim; silvadene cr topical  · No direct eye involvement seen, but can f/u [facility] prn

## 2017-05-12 ENCOUNTER — OFFICE VISIT (OUTPATIENT)
Dept: MEDICAL GROUP | Facility: MEDICAL CENTER | Age: 22
End: 2017-05-12
Payer: COMMERCIAL

## 2017-05-12 VITALS
BODY MASS INDEX: 27.48 KG/M2 | SYSTOLIC BLOOD PRESSURE: 108 MMHG | TEMPERATURE: 98.8 F | HEIGHT: 60 IN | HEART RATE: 91 BPM | DIASTOLIC BLOOD PRESSURE: 64 MMHG | OXYGEN SATURATION: 100 % | WEIGHT: 140 LBS

## 2017-05-12 DIAGNOSIS — F32.A ANXIETY AND DEPRESSION: ICD-10-CM

## 2017-05-12 DIAGNOSIS — F41.9 ANXIETY AND DEPRESSION: ICD-10-CM

## 2017-05-12 PROCEDURE — 99213 OFFICE O/P EST LOW 20 MIN: CPT | Performed by: NURSE PRACTITIONER

## 2017-05-12 RX ORDER — ESCITALOPRAM OXALATE 10 MG/1
10 TABLET ORAL DAILY
Qty: 30 TAB | Refills: 11 | Status: SHIPPED | OUTPATIENT
Start: 2017-05-12 | End: 2018-08-01

## 2017-05-12 ASSESSMENT — PATIENT HEALTH QUESTIONNAIRE - PHQ9
1. LITTLE INTEREST OR PLEASURE IN DOING THINGS: 0
4. FEELING TIRED OR HAVING LITTLE ENERGY: 0
SUM OF ALL RESPONSES TO PHQ QUESTIONS 1-9: 1
6. FEELING BAD ABOUT YOURSELF - OR THAT YOU ARE A FAILURE OR HAVE LET YOURSELF OR YOUR FAMILY DOWN: 0
2. FEELING DOWN, DEPRESSED, IRRITABLE, OR HOPELESS: 1
9. THOUGHTS THAT YOU WOULD BE BETTER OFF DEAD, OR OF HURTING YOURSELF: 0
7. TROUBLE CONCENTRATING ON THINGS, SUCH AS READING THE NEWSPAPER OR WATCHING TELEVISION: 0
5. POOR APPETITE OR OVEREATING: 0
SUM OF ALL RESPONSES TO PHQ9 QUESTIONS 1 AND 2: 1
8. MOVING OR SPEAKING SO SLOWLY THAT OTHER PEOPLE COULD HAVE NOTICED. OR THE OPPOSITE, BEING SO FIGETY OR RESTLESS THAT YOU HAVE BEEN MOVING AROUND A LOT MORE THAN USUAL: 0
3. TROUBLE FALLING OR STAYING ASLEEP OR SLEEPING TOO MUCH: 0

## 2017-05-12 NOTE — PROGRESS NOTES
Subjective:     Chief Complaint   Patient presents with   • Follow-Up     Sulema Castorena is a 21 y.o. female here today to follow up on:    Anxiety and depression  Significant improvement since starting lexapro 10 mg daily  Much calmer, able to handle stress better  Family and boyfriend has noticed a difference  Last PHQ9 score 23, repeat today 1  Denies concerning side effects from medication  She has noted that her appetite is increased but she has not gained any weight. She is exercising a few days weekly       Current medicines (including changes today)  Current Outpatient Prescriptions   Medication Sig Dispense Refill   • escitalopram (LEXAPRO) 10 MG Tab Take 1 Tab by mouth every day. 30 Tab 11   • sulfamethoxazole-trimethoprim (BACTRIM DS) 800-160 MG tablet Take 1 Tab by mouth 2 times a day for 7 days. 14 Tab 0   • silver sulfADIAZINE (SILVADENE) 1 % Cream Apply to skin burn area bid 15 g 0   • trazodone (DESYREL) 50 MG Tab Take 1 Tab by mouth at bedtime as needed for Sleep. 30 Tab 1   • metoclopramide (REGLAN) 10 MG Tab Take 1 Tab by mouth 3 times a day before meals. 15 Tab 0   • levonorgestrel-ethinyl estradiol (LUTERA) 0.1-20 MG-MCG per tablet Take 1 Tab by mouth every day. 28 Tab 11     No current facility-administered medications for this visit.     She  has a past medical history of Depression. She also has no past medical history of Diabetes or ASTHMA.    ROS included above     Objective:     Blood pressure 108/64, pulse 91, temperature 37.1 °C (98.8 °F), height 1.524 m (5'), weight 63.504 kg (140 lb), SpO2 100 %. Body mass index is 27.34 kg/(m^2).     Physical Exam:  General: Alert, oriented in no acute distress.  Eye contact is good, speech is normal, affect calm  Lungs: clear to auscultation bilaterally, normal effort, no wheeze/ rhonchi/ rales.  CV: regular rate and rhythm, S1, S2  Ext: no edema, color normal, vascularity normal, temperature normal    Assessment and Plan:   The following  treatment plan was discussed   1. Anxiety and depression   significant improvement with Lexapro, continue current medication. She has no plans for pregnancy in the near future, advised to follow-up if she plans to become pregnant over like to discontinue medication. Otherwise annually  escitalopram (LEXAPRO) 10 MG Tab       Followup: Annually         Please note that this dictation was created using voice recognition software. I have worked with consultants from the vendor as well as technical experts from [facility] [employer] to optimize the interface. I have made every reasonable attempt to correct obvious errors, but I expect that there are errors of grammar and possibly content that I did not discover before finalizing the note.

## 2017-05-12 NOTE — MR AVS SNAPSHOT
Sulema Castorena   2017 11:00 AM   Office Visit   MRN: [id number]    Department:  [facility]   Dept Phone:  [phone]    Description:  Female : 1995   Provider:  ROSAURA Driver           Reason for Visit     Follow-Up           Allergies as of 2017     No Known Allergies      You were diagnosed with     Anxiety and depression   [[id number]]         Vital Signs     Blood Pressure Pulse Temperature Height Weight Body Mass Index    108/64 mmHg 91 37.1 °C (98.8 °F) 1.524 m (5') 63.504 kg (140 lb) 27.34 kg/m2    Oxygen Saturation Smoking Status                100% Never Smoker           Basic Information     Date Of Birth Sex Race Ethnicity Preferred Language    1995 Female White,  or  Non- English      Problem List              ICD-10-CM Priority Class Noted - Resolved    Intractable migraine without status migrainosus G43.919   2015 - Present    History of colonoscopy Z98.890   2015 - Present    Anxiety and depression F41.9, F32.9   2015 - Present    History of self-harm Z91.5   2015 - Present    Marijuana use F12.10   2015 - Present      Health Maintenance        Date Due Completion Dates    PAP SMEAR 2018    IMM DTaP/Tdap/Td Vaccine (6 - Td) 2021, 5/10/2006, 1996, 1995, 1995, 1994            Current Immunizations     Dtap Vaccine 1996, 1995, 1995, 1994    HIB Vaccine (ACTHIB/HIBERIX) 1996, 1995, 1995, 1994    HPV Quadrivalent Vaccine (GARDASIL) 2011, 2007, 2007    Hepatitis A Vaccine, Ped/Adol 3/7/2007, 1999    Hepatitis B Vaccine Recombivax (Adol/Adult) 2006, 1995, 1994    IPV 2006    MMR Vaccine 2006, 10/28/1995    Meningococcal Conjugate Vaccine MCV4 (Menveo) 2011    OPV - Historical Data 1995, 1995, 1994    TD Vaccine 5/10/2006    Tdap Vaccine  8/23/2011    Varicella Vaccine Live 7/17/2007, 11/17/2006      Below and/or attached are the medications your provider expects you to take. Review all of your home medications and newly ordered medications with your provider and/or pharmacist. Follow medication instructions as directed by your provider and/or pharmacist. Please keep your medication list with you and share with your provider. Update the information when medications are discontinued, doses are changed, or new medications (including over-the-counter products) are added; and carry medication information at all times in the event of emergency situations     Allergies:  No Known Allergies          Medications  Valid as of: May 12, 2017 - 11:14 AM    Generic Name Brand Name Tablet Size Instructions for use    Escitalopram Oxalate (Tab) LEXAPRO 10 MG Take 1 Tab by mouth every day.        FLUoxetine HCl (Cap) PROZAC 20 MG TAKE 1 CAPSULE BY MOUTH EVERY DAY        Levonorgestrel-Ethinyl Estrad (Tab) AVIANE, ALESSE, LESSINA 0.1-20 MG-MCG Take 1 Tab by mouth every day.        Metoclopramide HCl (Tab) REGLAN 10 MG Take 1 Tab by mouth 3 times a day before meals.        Silver Sulfadiazine (Cream) SILVADENE 1 % Apply to skin burn area bid        Sulfamethoxazole-Trimethoprim (Tab) BACTRIM -160 MG Take 1 Tab by mouth 2 times a day for 7 days.        TraZODone HCl (Tab) DESYREL 50 MG Take 1 Tab by mouth at bedtime as needed for Sleep.        .                 Medicines prescribed today were sent to:     [facility] [address] [place] [address] & [place] [address] [address] [facility] [address]    Phone: [phone] Fax: [phone]    Open 24 Hours?: Yes      Medication refill instructions:       If your prescription bottle indicates you have medication refills left, it is not necessary to call your provider’s office. Please contact your pharmacy and they will refill your medication.    If your prescription bottle indicates  you do not have any refills left, you may request refills at any time through one of the following ways: The online Goomeo system (except Urgent Care), by calling your provider’s office, or by asking your pharmacy to contact your provider’s office with a refill request. Medication refills are processed only during regular business hours and may not be available until the next business day. Your provider may request additional information or to have a follow-up visit with you prior to refilling your medication.   *Please Note: Medication refills are assigned a new Rx number when refilled electronically. Your pharmacy may indicate that no refills were authorized even though a new prescription for the same medication is available at the pharmacy. Please request the medicine by name with the pharmacy before contacting your provider for a refill.           Goomeo Access Code: [email or web address]  Expires: 6/6/2017 11:47 AM    Goomeo  A secure, online tool to manage your health information     [employer]’s Goomeo® is a secure, online tool that connects you to your personalized health information from the privacy of your home -- day or night - making it very easy for you to manage your healthcare. Once the activation process is completed, you can even access your medical information using the Goomeo emma, which is available for free in the Apple Emma store or Google Play store.     Goomeo provides the following levels of access (as shown below):   My Chart Features   [facility]  Specialists [facility]  Urgent  Care Non-[facility]  Primary Care  Doctor   Email your healthcare team securely and privately 24/7 X X X    Manage appointments: schedule your next appointment; view details of past/upcoming appointments X      Request prescription refills. X      View recent personal medical records, including lab and immunizations X X X X   View health record, including health history, allergies, medications X X  X X   Read reports about your outpatient visits, procedures, consult and ER notes X X X X   See your discharge summary, which is a recap of your hospital and/or ER visit that includes your diagnosis, lab results, and care plan. X X       How to register for [facility]:  1. Go to  [email or web address].  2. Click on the Sign Up Now box, which takes you to the New Member Sign Up page. You will need to provide the following information:  a. Enter your [facility] Access Code exactly as it appears at the top of this page. (You will not need to use this code after you’ve completed the sign-up process. If you do not sign up before the expiration date, you must request a new code.)   b. Enter your date of birth.   c. Enter your home email address.   d. Click Submit, and follow the next screen’s instructions.  3. Create a [facility] ID. This will be your [facility] login ID and cannot be changed, so think of one that is secure and easy to remember.  4. Create a [facility] password. You can change your password at any time.  5. Enter your Password Reset Question and Answer. This can be used at a later time if you forget your password.   6. Enter your e-mail address. This allows you to receive e-mail notifications when new information is available in [facility].  7. Click Sign Up. You can now view your health information.    For assistance activating your [facility] account, call [phone]

## 2017-05-12 NOTE — ASSESSMENT & PLAN NOTE
Significant improvement since starting lexapro 10 mg daily  Much calmer, able to handle stress better  Family and boyfriend has noticed a difference  Last PHQ9 score 23, repeat today 1  Denies concerning side effects from medication  She has noted that her appetite is increased but she has not gained any weight. She is exercising a few days weekly

## 2017-11-28 RX ORDER — LEVONORGESTREL AND ETHINYL ESTRADIOL 0.1-0.02MG
KIT ORAL
Refills: 0 | OUTPATIENT
Start: 2017-11-28

## 2017-12-04 RX ORDER — LEVONORGESTREL AND ETHINYL ESTRADIOL 0.1-0.02MG
1 KIT ORAL
Qty: 28 TAB | Refills: 11 | Status: SHIPPED | OUTPATIENT
Start: 2017-12-04 | End: 2018-11-05 | Stop reason: SDUPTHER

## 2018-01-29 RX ORDER — TRAZODONE HYDROCHLORIDE 50 MG/1
TABLET ORAL
Qty: 30 TAB | Refills: 5 | Status: SHIPPED | OUTPATIENT
Start: 2018-01-29 | End: 2019-12-20

## 2018-03-24 ENCOUNTER — HOSPITAL ENCOUNTER (EMERGENCY)
Facility: MEDICAL CENTER | Age: 23
End: 2018-03-24
Attending: EMERGENCY MEDICINE
Payer: COMMERCIAL

## 2018-03-24 VITALS
WEIGHT: 143.3 LBS | HEIGHT: 60 IN | BODY MASS INDEX: 28.13 KG/M2 | RESPIRATION RATE: 16 BRPM | HEART RATE: 71 BPM | SYSTOLIC BLOOD PRESSURE: 115 MMHG | DIASTOLIC BLOOD PRESSURE: 76 MMHG | TEMPERATURE: 97.3 F | OXYGEN SATURATION: 100 %

## 2018-03-24 DIAGNOSIS — J10.1 INFLUENZA B: ICD-10-CM

## 2018-03-24 DIAGNOSIS — J40 BRONCHITIS: ICD-10-CM

## 2018-03-24 DIAGNOSIS — J06.9 UPPER RESPIRATORY TRACT INFECTION, UNSPECIFIED TYPE: ICD-10-CM

## 2018-03-24 LAB
FLUAV RNA SPEC QL NAA+PROBE: NEGATIVE
FLUBV RNA SPEC QL NAA+PROBE: POSITIVE

## 2018-03-24 PROCEDURE — 99284 EMERGENCY DEPT VISIT MOD MDM: CPT

## 2018-03-24 PROCEDURE — 87502 INFLUENZA DNA AMP PROBE: CPT

## 2018-03-24 RX ORDER — KETOROLAC TROMETHAMINE 10 MG/1
10 TABLET, FILM COATED ORAL EVERY 6 HOURS PRN
Qty: 22 TAB | Refills: 0 | Status: SHIPPED | OUTPATIENT
Start: 2018-03-24 | End: 2018-08-01

## 2018-03-24 ASSESSMENT — PAIN SCALES - GENERAL: PAINLEVEL_OUTOF10: 7

## 2018-03-24 NOTE — DISCHARGE INSTRUCTIONS
"Influenza Facts  Flu (influenza) is a contagious respiratory illness caused by the influenza viruses. It can cause mild to severe illness. While most healthy people recover from the flu without specific treatment and without complications, older people, young children, and people with certain health conditions are at higher risk for serious complications from the flu, including death.  CAUSES   · The flu virus is spread from person to person by respiratory droplets from coughing and sneezing.   · A person can also become infected by touching an object or surface with a virus on it and then touching their mouth, eye or nose.   · Adults may be able to infect others from 1 day before symptoms occur and up to 7 days after getting sick. So it is possible to give someone the flu even before you know you are sick and continue to infect others while you are sick.   SYMPTOMS   · Fever (usually high).   · Headache.   · Tiredness (can be extreme).   · Cough.   · Sore throat.   · Runny or stuffy nose.   · Body aches.   · Diarrhea and vomiting may also occur, particularly in children.   · These symptoms are referred to as \"flu-like symptoms\". A lot of different illnesses, including the common cold, can have similar symptoms.   DIAGNOSIS   · There are tests that can determine if you have the flu as long you are tested within the first 2 or 3 days of illness.   · A doctor's exam and additional tests may be needed to identify if you have a disease that is a complicating the flu.   RISKS AND COMPLICATIONS   Some of the complications caused by the flu include:  · Bacterial pneumonia or progressive pneumonia caused by the flu virus.   · Loss of body fluids (dehydration).   · Worsening of chronic medical conditions, such as heart failure, asthma, or diabetes.   · Sinus problems and ear infections.   HOME CARE INSTRUCTIONS   · Seek medical care early on.   · If you are at high risk from complications of the flu, consult your health-care " provider as soon as you develop flu-like symptoms. Those at high risk for complications include:   · People 65 years or older.   · People with chronic medical conditions, including diabetes.   · Pregnant women.   · Young children.   · Your caregiver may recommend use of an antiviral medication to help treat the flu.   · If you get the flu, get plenty of rest, drink a lot of liquids, and avoid using alcohol and tobacco.   · You can take over-the-counter medications to relieve the symptoms of the flu if your caregiver approves. (Never give aspirin to children or teenagers who have flu-like symptoms, particularly fever).   PREVENTION   The single best way to prevent the flu is to get a flu vaccine each fall. Other measures that can help protect against the flu are:  · Antiviral Medications   · A number of antiviral drugs are approved for use in preventing the flu. These are prescription medications, and a doctor should be consulted before they are used.   · Habits for Good Health   · Cover your nose and mouth with a tissue when you cough or sneeze, throw the tissue away after you use it.   · Wash your hands often with soap and water, especially after you cough or sneeze. If you are not near water, use an alcohol-based hand .   · Avoid people who are sick.   · If you get the flu, stay home from work or school. Avoid contact with other people so that you do not make them sick, too.   · Try not to touch your eyes, nose, or mouth as germs ore often spread this way.   IN CHILDREN, EMERGENCY WARNING SIGNS THAT NEED URGENT MEDICAL ATTENTION:  · Fast breathing or trouble breathing.   · Bluish skin color.   · Not drinking enough fluids.   · Not waking up or not interacting.   · Being so irritable that the child does not want to be held.   · Flu-like symptoms improve but then return with fever and worse cough.   · Fever with a rash.   IN ADULTS, EMERGENCY WARNING SIGNS THAT NEED URGENT MEDICAL ATTENTION:  · Difficulty  breathing or shortness of breath.   · Pain or pressure in the chest or abdomen.   · Sudden dizziness.   · Confusion.   · Severe or persistent vomiting.   SEEK IMMEDIATE MEDICAL CARE IF:   You or someone you know is experiencing any of the symptoms above. When you arrive at the emergency center,report that you think you have the flu. You may be asked to wear a mask and/or sit in a secluded area to protect others from getting sick.  MAKE SURE YOU:   · Understand these instructions.   · Monitor your condition.   · Seek medical care if you are getting worse, or not improving.   Document Released: 12/20/2004 Document Revised: 03/11/2013 Document Reviewed: 09/16/2010  [employer]® Patient Information ©2013 [employer]..Return if fever, vomiting or if no better in 12 hours.

## 2018-03-24 NOTE — ED PROVIDER NOTES
ED Provider Note    CHIEF COMPLAINT  Chief Complaint   Patient presents with   • Dizziness   • Fever       HPI  Sulema Castorena is a 22 y.o. female who presents with lightheadedness for the last 2 or 3 days, night sweats for 2 days. Feels like she had a fever however when she took her temperature it was 98.2. Last period is now. She has been coughing up some green sputum no sore throat no headache no neck pain or vomiting no diarrhea. Does have some nausea.    REVIEW OF SYSTEMS  See HPI for further details.Denies other G.I., G.U.. endrocine, cardiovascular, respriatory or neurological problems.  All other systems are negative.     PAST MEDICAL HISTORY  Past Medical History:   Diagnosis Date   • Depression        FAMILY HISTORY  Family History   Problem Relation Age of Onset   • Alcohol/Drug Father    • Alcohol/Drug Brother        SOCIAL HISTORY  Social History     Social History   • Marital status: Single     Spouse name: N/A   • Number of children: N/A   • Years of education: N/A     Social History Main Topics   • Smoking status: Never Smoker   • Smokeless tobacco: Never Used   • Alcohol use No   • Drug use: Yes     Types: Marijuana, Inhaled      Comment: THC, daily   • Sexual activity: No     Other Topics Concern   • Not on file     Social History Narrative   • No narrative on file       SURGICAL HISTORY  No past surgical history on file.    CURRENT MEDICATIONS  Home Medications     Reviewed by Dana Borges R.N. (Registered Nurse) on 03/24/18 at 0812  Med List Status: Partial   Medication Last Dose Status   escitalopram (LEXAPRO) 10 MG Tab  Active   escitalopram (LEXAPRO) 10 MG Tab  Active   levonorgestrel-ethinyl estradiol (LUTERA) 0.1-20 MG-MCG per tablet  Active   metoclopramide (REGLAN) 10 MG Tab not taking Active   silver sulfADIAZINE (SILVADENE) 1 % Cream  Active   trazodone (DESYREL) 50 MG Tab  Active                ALLERGIES  No Known Allergies    PHYSICAL EXAM  VITAL SIGNS: /84    Pulse 94   Temp 36.7 °C (98 °F)   Resp 18   Ht 1.524 m (5')   Wt 65 kg (143 lb 4.8 oz)   SpO2 100%   BMI 27.99 kg/m²   Constitutional: Well developed, Well nourished, No acute distress, Non-toxic appearance.   HENT: Normocephalic, Atraumatic, Bilateral external ears normal, Oropharynx moist, No oral exudates, Nose normal.   Eyes: PERRL, EOMI, Conjunctiva normal, No discharge.   Neck: Normal range of motion, No tenderness, Supple, No stridor.   Lymphatic: No lymphadenopathy noted.   Cardiovascular: Normal heart rate, Normal rhythm, No murmurs, No rubs, No gallops.   Thorax & Lungs: Normal breath sounds, No respiratory distress, No wheezing, No chest tenderness.   Abdomen:  No tenderness, no guarding no rigidity and the abdomen is soft.  No masses, No pulsatile masses.  Skin: Warm, Dry, No erythema, No rash.   Back: No tenderness, No CVA tenderness.   Extremities: Intact distal pulses, No edema, No tenderness, No cyanosis, No clubbing.   Musculoskeletal: Good range of motion in all major joints. No tenderness to palpation or major deformities noted.   Neurologic: Alert & oriented x 3, Normal motor function, Normal sensory function, No focal deficits noted.   Psychiatric: Affect normal, Judgment normal, Mood normal.       RADIOLOGY/PROCEDURES      COURSE & MEDICAL DECISION MAKING  Pertinent Labs & Imaging studies reviewed. (See chart for details) she is positive for influenza B    As flulike syndrome, positive for influenza B. I don't think Tamiflu will be of any utility as the fluid is influenza B and she is in 3 days. She is given anti-inflammatories for her pain.  FINAL IMPRESSION  1.   1. Bronchitis    2. Upper respiratory tract infection, unspecified type    3. Influenza B            2.   3.     Disposition  Discharge instructions are understood. This patient is to return if fever vomiting or no better in 12 hours. Follow up with the [facility] clinic or private physician. Information sheets on  Electronically  signed by: Binu Croft, 3/24/2018 8:27 AM

## 2018-03-24 NOTE — ED NOTES
Pt brought back from [facility], ambulatory with steady gait.     Pt c/o night sweats x a couple days but states last night was worse. Pt also states palpitations while sleeping last night. States she woke up this AM and felt nausea/dizziness. Pt states she has hx of cellulitis and is concerned that it could be affecting her heart. Prior notes state pt had cellulitis to L breast and axilla with HR initially tachycardic (140's) d/t infection.     A/o x4, speaking in full sentences.

## 2018-03-24 NOTE — ED NOTES
MD at bedside prior to d/c. Pt given d/c instruction and verbalized understanding. One rx sent to pharmacy. Pt ambulatory to lobby, gait steady. Pt took all belongings from room.

## 2018-03-24 NOTE — ED TRIAGE NOTES
"Pt to triage . C/o \"waking up throughout the night in sweats\", woke up this morning , c/o dizziness, nausea, sweats \"  Pt denies chest pain , denies SOB , \" just worried because she has a hx of cellulitis that went to her heart \"   "

## 2018-03-25 ENCOUNTER — PATIENT OUTREACH (OUTPATIENT)
Dept: HEALTH INFORMATION MANAGEMENT | Facility: OTHER | Age: 23
End: 2018-03-25

## 2018-07-25 RX ORDER — ESCITALOPRAM OXALATE 10 MG/1
TABLET ORAL
Qty: 30 TAB | Refills: 1 | Status: SHIPPED | OUTPATIENT
Start: 2018-07-25 | End: 2018-07-31 | Stop reason: SDUPTHER

## 2018-07-25 NOTE — TELEPHONE ENCOUNTER
2 months of medication sent, please contact patient and schedule follow-up visit.  Last seen in April 2017

## 2018-07-28 ENCOUNTER — HOSPITAL ENCOUNTER (EMERGENCY)
Facility: MEDICAL CENTER | Age: 23
End: 2018-07-28
Attending: EMERGENCY MEDICINE
Payer: COMMERCIAL

## 2018-07-28 ENCOUNTER — APPOINTMENT (OUTPATIENT)
Dept: RADIOLOGY | Facility: MEDICAL CENTER | Age: 23
End: 2018-07-28
Attending: EMERGENCY MEDICINE
Payer: COMMERCIAL

## 2018-07-28 VITALS
BODY MASS INDEX: 29.17 KG/M2 | HEIGHT: 60 IN | TEMPERATURE: 97.3 F | DIASTOLIC BLOOD PRESSURE: 92 MMHG | SYSTOLIC BLOOD PRESSURE: 137 MMHG | RESPIRATION RATE: 16 BRPM | WEIGHT: 148.59 LBS | OXYGEN SATURATION: 98 % | HEART RATE: 83 BPM

## 2018-07-28 DIAGNOSIS — R10.31 RIGHT LOWER QUADRANT ABDOMINAL PAIN: ICD-10-CM

## 2018-07-28 LAB
ALBUMIN SERPL BCP-MCNC: 4.6 G/DL (ref 3.2–4.9)
ALBUMIN/GLOB SERPL: 1.8 G/DL
ALP SERPL-CCNC: 34 U/L (ref 30–99)
ALT SERPL-CCNC: 12 U/L (ref 2–50)
ANION GAP SERPL CALC-SCNC: 6 MMOL/L (ref 0–11.9)
APPEARANCE UR: CLEAR
AST SERPL-CCNC: 18 U/L (ref 12–45)
BACTERIA GENITAL QL WET PREP: NORMAL
BASOPHILS # BLD AUTO: 0.8 % (ref 0–1.8)
BASOPHILS # BLD: 0.06 K/UL (ref 0–0.12)
BILIRUB SERPL-MCNC: 0.3 MG/DL (ref 0.1–1.5)
BILIRUB UR QL STRIP.AUTO: NEGATIVE
BUN SERPL-MCNC: 16 MG/DL (ref 8–22)
C TRACH DNA SPEC QL NAA+PROBE: NEGATIVE
CALCIUM SERPL-MCNC: 9.1 MG/DL (ref 8.5–10.5)
CHLORIDE SERPL-SCNC: 106 MMOL/L (ref 96–112)
CO2 SERPL-SCNC: 21 MMOL/L (ref 20–33)
COLOR UR: YELLOW
CREAT SERPL-MCNC: 0.59 MG/DL (ref 0.5–1.4)
EOSINOPHIL # BLD AUTO: 0.13 K/UL (ref 0–0.51)
EOSINOPHIL NFR BLD: 1.8 % (ref 0–6.9)
ERYTHROCYTE [DISTWIDTH] IN BLOOD BY AUTOMATED COUNT: 42.5 FL (ref 35.9–50)
GLOBULIN SER CALC-MCNC: 2.6 G/DL (ref 1.9–3.5)
GLUCOSE SERPL-MCNC: 98 MG/DL (ref 65–99)
GLUCOSE UR STRIP.AUTO-MCNC: NEGATIVE MG/DL
HCG UR QL: NEGATIVE
HCT VFR BLD AUTO: 43.6 % (ref 37–47)
HGB BLD-MCNC: 14.4 G/DL (ref 12–16)
IMM GRANULOCYTES # BLD AUTO: 0.01 K/UL (ref 0–0.11)
IMM GRANULOCYTES NFR BLD AUTO: 0.1 % (ref 0–0.9)
KETONES UR STRIP.AUTO-MCNC: NEGATIVE MG/DL
LEUKOCYTE ESTERASE UR QL STRIP.AUTO: NEGATIVE
LIPASE SERPL-CCNC: 40 U/L (ref 11–82)
LYMPHOCYTES # BLD AUTO: 2.46 K/UL (ref 1–4.8)
LYMPHOCYTES NFR BLD: 34.4 % (ref 22–41)
MCH RBC QN AUTO: 30.8 PG (ref 27–33)
MCHC RBC AUTO-ENTMCNC: 33 G/DL (ref 33.6–35)
MCV RBC AUTO: 93.4 FL (ref 81.4–97.8)
MICRO URNS: NORMAL
MONOCYTES # BLD AUTO: 0.71 K/UL (ref 0–0.85)
MONOCYTES NFR BLD AUTO: 9.9 % (ref 0–13.4)
N GONORRHOEA DNA SPEC QL NAA+PROBE: NEGATIVE
NEUTROPHILS # BLD AUTO: 3.78 K/UL (ref 2–7.15)
NEUTROPHILS NFR BLD: 53 % (ref 44–72)
NITRITE UR QL STRIP.AUTO: NEGATIVE
NRBC # BLD AUTO: 0 K/UL
NRBC BLD-RTO: 0 /100 WBC
PH UR STRIP.AUTO: 6 [PH]
PLATELET # BLD AUTO: 328 K/UL (ref 164–446)
PMV BLD AUTO: 8.9 FL (ref 9–12.9)
POTASSIUM SERPL-SCNC: 4.1 MMOL/L (ref 3.6–5.5)
PROT SERPL-MCNC: 7.2 G/DL (ref 6–8.2)
PROT UR QL STRIP: NEGATIVE MG/DL
RBC # BLD AUTO: 4.67 M/UL (ref 4.2–5.4)
RBC UR QL AUTO: NEGATIVE
SIGNIFICANT IND 70042: NORMAL
SITE SITE: NORMAL
SODIUM SERPL-SCNC: 133 MMOL/L (ref 135–145)
SOURCE SOURCE: NORMAL
SP GR UR REFRACTOMETRY: 1.02
SP GR UR STRIP.AUTO: 1.02
SPECIMEN SOURCE: NORMAL
UROBILINOGEN UR STRIP.AUTO-MCNC: 0.2 MG/DL
WBC # BLD AUTO: 7.2 K/UL (ref 4.8–10.8)

## 2018-07-28 PROCEDURE — 74177 CT ABD & PELVIS W/CONTRAST: CPT

## 2018-07-28 PROCEDURE — 81025 URINE PREGNANCY TEST: CPT

## 2018-07-28 PROCEDURE — 87591 N.GONORRHOEAE DNA AMP PROB: CPT

## 2018-07-28 PROCEDURE — 81003 URINALYSIS AUTO W/O SCOPE: CPT

## 2018-07-28 PROCEDURE — 80053 COMPREHEN METABOLIC PANEL: CPT

## 2018-07-28 PROCEDURE — 700105 HCHG RX REV CODE 258: Performed by: EMERGENCY MEDICINE

## 2018-07-28 PROCEDURE — 83690 ASSAY OF LIPASE: CPT

## 2018-07-28 PROCEDURE — 700111 HCHG RX REV CODE 636 W/ 250 OVERRIDE (IP): Performed by: EMERGENCY MEDICINE

## 2018-07-28 PROCEDURE — 96374 THER/PROPH/DIAG INJ IV PUSH: CPT

## 2018-07-28 PROCEDURE — 85025 COMPLETE CBC W/AUTO DIFF WBC: CPT

## 2018-07-28 PROCEDURE — 96375 TX/PRO/DX INJ NEW DRUG ADDON: CPT

## 2018-07-28 PROCEDURE — 99285 EMERGENCY DEPT VISIT HI MDM: CPT

## 2018-07-28 PROCEDURE — 700117 HCHG RX CONTRAST REV CODE 255: Performed by: EMERGENCY MEDICINE

## 2018-07-28 PROCEDURE — 87491 CHLMYD TRACH DNA AMP PROBE: CPT

## 2018-07-28 RX ORDER — KETOROLAC TROMETHAMINE 10 MG/1
10 TABLET, FILM COATED ORAL EVERY 6 HOURS PRN
Qty: 22 TAB | Refills: 0 | Status: SHIPPED | OUTPATIENT
Start: 2018-07-28 | End: 2018-08-01

## 2018-07-28 RX ORDER — KETOROLAC TROMETHAMINE 30 MG/ML
30 INJECTION, SOLUTION INTRAMUSCULAR; INTRAVENOUS ONCE
Status: COMPLETED | OUTPATIENT
Start: 2018-07-28 | End: 2018-07-28

## 2018-07-28 RX ORDER — SODIUM CHLORIDE 9 MG/ML
INJECTION, SOLUTION INTRAVENOUS CONTINUOUS
Status: DISCONTINUED | OUTPATIENT
Start: 2018-07-28 | End: 2018-07-28 | Stop reason: HOSPADM

## 2018-07-28 RX ORDER — ONDANSETRON 2 MG/ML
4 INJECTION INTRAMUSCULAR; INTRAVENOUS ONCE
Status: COMPLETED | OUTPATIENT
Start: 2018-07-28 | End: 2018-07-28

## 2018-07-28 RX ADMIN — IOHEXOL 100 ML: 350 INJECTION, SOLUTION INTRAVENOUS at 14:00

## 2018-07-28 RX ADMIN — ONDANSETRON HYDROCHLORIDE 4 MG: 2 INJECTION, SOLUTION INTRAMUSCULAR; INTRAVENOUS at 11:41

## 2018-07-28 RX ADMIN — KETOROLAC TROMETHAMINE 30 MG: 30 INJECTION, SOLUTION INTRAMUSCULAR at 11:41

## 2018-07-28 RX ADMIN — SODIUM CHLORIDE: 9 INJECTION, SOLUTION INTRAVENOUS at 11:41

## 2018-07-28 ASSESSMENT — LIFESTYLE VARIABLES: DO YOU DRINK ALCOHOL: NO

## 2018-07-28 ASSESSMENT — PAIN SCALES - GENERAL
PAINLEVEL_OUTOF10: 7
PAINLEVEL_OUTOF10: 4

## 2018-07-28 NOTE — DISCHARGE INSTRUCTIONS
Abdominal Pain, Adult  Many things can cause belly (abdominal) pain. Most times, belly pain is not dangerous. Many cases of belly pain can be watched and treated at home. Sometimes belly pain is serious, though. Your doctor will try to find the cause of your belly pain.  Follow these instructions at home:  · Take over-the-counter and prescription medicines only as told by your doctor. Do not take medicines that help you poop (laxatives) unless told to by your doctor.  · Drink enough fluid to keep your pee (urine) clear or pale yellow.  · Watch your belly pain for any changes.  · Keep all follow-up visits as told by your doctor. This is important.  Contact a doctor if:  · Your belly pain changes or gets worse.  · You are not hungry, or you lose weight without trying.  · You are having trouble pooping (constipated) or have watery poop (diarrhea) for more than 2-3 days.  · You have pain when you pee or poop.  · Your belly pain wakes you up at night.  · Your pain gets worse with meals, after eating, or with certain foods.  · You are throwing up and cannot keep anything down.  · You have a fever.  Get help right away if:  · Your pain does not go away as soon as your doctor says it should.  · You cannot stop throwing up.  · Your pain is only in areas of your belly, such as the right side or the left lower part of the belly.  · You have bloody or black poop, or poop that looks like tar.  · You have very bad pain, cramping, or bloating in your belly.  · You have signs of not having enough fluid or water in your body (dehydration), such as:  ¨ Dark pee, very little pee, or no pee.  ¨ Cracked lips.  ¨ Dry mouth.  ¨ Sunken eyes.  ¨ Sleepiness.  ¨ Weakness.  This information is not intended to replace advice given to you by your health care provider. Make sure you discuss any questions you have with your health care provider.  Document Released: 06/05/2009 Document Revised: 07/07/2017 Document Reviewed: 05/31/2017  [email or web address]  Interactive Patient Education © 2017 [employer]  Return if fever, vomiting or if no better in 12 hours.

## 2018-07-28 NOTE — ED NOTES
PT A&O X4, VS STABLE, RESPIRATIONS SYMMETRICAL WITH NO S/S OF DISTRESS. PT VERBALIZES UNDERSTANDING OF DISCHARGE INSTRUCTIONS AND DENIES QUESTIONS. PT DISCHARGED TO HOME WITH RIDE.

## 2018-07-28 NOTE — ED TRIAGE NOTES
"Chief Complaint   Patient presents with   • RLQ Pain     pt reports RLQ cramping x a few days. pt reports that it became very painful last night. +nausea, unknown fever, \"feeling hot and sweaty.\"       LMP ended 1 week ago. Pt reports Hx of GI \"issues.\"   Blood pressure 137/92, pulse 90, temperature 36.3 °C (97.3 °F), resp. rate 14, height 1.524 m (5'), weight 67.4 kg (148 lb 9.4 oz), SpO2 100 %.    Pt informed of wait times. Educated on triage process.  Asked to return to triage RN for any new or worsening of symptoms. Thanked for patience.        "

## 2018-07-28 NOTE — ED PROVIDER NOTES
"ED Provider Note    CHIEF COMPLAINT  Chief Complaint   Patient presents with   • RLQ Pain     pt reports RLQ cramping x a few days. pt reports that it became very painful last night. +nausea, unknown fever, \"feeling hot and sweaty.\"         HPI  Sulema Castorena is a 22 y.o. female who presents with abdominal pain, right lower quadrant pain, for the last 3 days, nausea without vomiting. No diarrhea no fever no chills no dysuria urgency or frequency. Pain is gradual severe dull and nonradiating. States she is not pregnant last period 1 week ago. Obstetric history is 0000. No other modifiers with her pain    REVIEW OF SYSTEMS  See HPI for further details.Denies other G.I., G.U.. endrocine, cardiovascular, respriatory or neurological problems.  All other systems are negative.     PAST MEDICAL HISTORY  Past Medical History:   Diagnosis Date   • Depression        FAMILY HISTORY  Family History   Problem Relation Age of Onset   • Alcohol/Drug Father    • Alcohol/Drug Brother        SOCIAL HISTORY  Social History     Social History   • Marital status: Single     Spouse name: N/A   • Number of children: N/A   • Years of education: N/A     Social History Main Topics   • Smoking status: Never Smoker   • Smokeless tobacco: Never Used   • Alcohol use No   • Drug use: Yes     Types: Marijuana, Inhaled      Comment: THC, daily   • Sexual activity: No     Other Topics Concern   • Not on file     Social History Narrative   • No narrative on file       SURGICAL HISTORY  History reviewed. No pertinent surgical history.    CURRENT MEDICATIONS  Home Medications    **Home medications have not yet been reviewed for this encounter**         ALLERGIES  No Known Allergies    PHYSICAL EXAM  VITAL SIGNS: /92   Pulse 83   Temp 36.3 °C (97.3 °F)   Resp 14   Ht 1.524 m (5')   Wt 67.4 kg (148 lb 9.4 oz)   LMP 07/16/2018 (Approximate)   SpO2 98%   BMI 29.02 kg/m²    Constitutional: Well developed, Well nourished, No acute " distress, Non-toxic appearance.   HENT: Normocephalic, Atraumatic, Bilateral external ears normal, Oropharynx moist, No oral exudates, Nose normal.   Eyes: PERRL, EOMI, Conjunctiva normal, No discharge.   Neck: Normal range of motion, No tenderness, Supple, No stridor.   Lymphatic: No lymphadenopathy noted.   Cardiovascular: Normal heart rate, Normal rhythm, No murmurs, No rubs, No gallops.   Thorax & Lungs: Normal breath sounds, No respiratory distress, No wheezing, No chest tenderness.   Abdomen: Tender right lower quadrant, no guarding no rigidity and the abdomen is soft.  No masses, No pulsatile masses.  Skin: Warm, Dry, No erythema, No rash.   Back: No tenderness, No CVA tenderness.   Extremities: Intact distal pulses, No edema, No tenderness, No cyanosis, No clubbing.   Musculoskeletal: Good range of motion in all major joints. No tenderness to palpation or major deformities noted.   Neurologic: Alert & oriented x 3, Normal motor function, Normal sensory function, No focal deficits noted.   Psychiatric: Affect normal, Judgment normal, Mood normal.   Pelvic exam:. No tenderness no masses no bleeding no uterine enlargement and no discharge    RADIOLOGY/PROCEDURES  CT-ABDOMEN-PELVIS WITH   Final Result      1.  There is no acute inflammatory process within the abdomen or pelvis.            COURSE & MEDICAL DECISION MAKING  Pertinent Labs & Imaging studies reviewed. (See chart for details) white count normal hematocrit 39 platelet count normal. Urinalysis, normal positive for influenza B.  Urinalysis normal pregnancy test negative      She is given IV normal saline and Toradol Zofran. Declines narcotic analgesics.   Is given IV normal saline for hydration prior to intravenous contrast      She comes in with abdominal pain, right lower quadrant pain, tender right lower quadrant pain. However her CBC is normal. Pelvic exam is negative. Urinalysis, no infection.  S  She has had an extensive workup here in the  emergency department. By 4 PM, the abdominal pain is resolving. At this point I still do not have a diagnosis. CAT scan demonstrates no acute abnormalities. Her blood count is normal. Urinalysis normal. She is not pregnant     This patient understands that abdominal pain may be very elusive. At this point there is no evidence of an acute abdominal emergency. However if the pain returns or is no better in 12 hours or any vomiting they should return to the emergency room immediately. Just because the lab and x-rays are normal does not rule out a severe abdominal emergency        FINAL IMPRESSION  1.   1. Right lower quadrant abdominal pain        2.   3.     Disposition  She will be given Toradol for pain, return for abdominal pain comes back.  Discharge instructions are understood. This patient is to return if fever vomiting or no better in 12 hours. Follow up with the [facility] clinic or private physician. Information sheets on abdominal pain    Electronically signed by: Binu Croft, 7/28/2018 10:48 AM

## 2018-07-31 ENCOUNTER — TELEPHONE (OUTPATIENT)
Dept: MEDICAL GROUP | Facility: MEDICAL CENTER | Age: 23
End: 2018-07-31

## 2018-07-31 RX ORDER — ESCITALOPRAM OXALATE 10 MG/1
10 TABLET ORAL
Qty: 90 TAB | Refills: 0 | Status: SHIPPED | OUTPATIENT
Start: 2018-07-31 | End: 2018-08-01 | Stop reason: SDUPTHER

## 2018-07-31 NOTE — TELEPHONE ENCOUNTER
Pt called stated her ins will only cover 90 days for lexapro. Can you please send the 90 pt is out of med-pt has follow up with you tomorrow

## 2018-08-01 ENCOUNTER — OFFICE VISIT (OUTPATIENT)
Dept: MEDICAL GROUP | Facility: MEDICAL CENTER | Age: 23
End: 2018-08-01
Payer: COMMERCIAL

## 2018-08-01 VITALS
HEART RATE: 92 BPM | TEMPERATURE: 98.2 F | DIASTOLIC BLOOD PRESSURE: 66 MMHG | OXYGEN SATURATION: 96 % | BODY MASS INDEX: 28.51 KG/M2 | WEIGHT: 146 LBS | SYSTOLIC BLOOD PRESSURE: 110 MMHG

## 2018-08-01 DIAGNOSIS — F41.9 ANXIETY AND DEPRESSION: ICD-10-CM

## 2018-08-01 DIAGNOSIS — R10.30 ABDOMINAL PAIN, LOWER: ICD-10-CM

## 2018-08-01 DIAGNOSIS — F32.A ANXIETY AND DEPRESSION: ICD-10-CM

## 2018-08-01 PROCEDURE — 99214 OFFICE O/P EST MOD 30 MIN: CPT | Performed by: NURSE PRACTITIONER

## 2018-08-01 RX ORDER — ESCITALOPRAM OXALATE 10 MG/1
10 TABLET ORAL
Qty: 90 TAB | Refills: 3 | Status: SHIPPED | OUTPATIENT
Start: 2018-08-01 | End: 2019-07-31 | Stop reason: SDUPTHER

## 2018-08-01 NOTE — LETTER
August 1, 2018         Patient: Sulema Castorena   YOB: 1995   Date of Visit: 8/1/2018           To Whom it May Concern:    Sulema Castorena was seen in my clinic on 8/1/2018.    If you have any questions or concerns, please don't hesitate to call.        Sincerely,           KRISTINE Driver.  Electronically Signed

## 2018-08-01 NOTE — PROGRESS NOTES
Subjective:     Chief Complaint   Patient presents with   • Hospital Follow-up     7/28/18, lower abd pain      Sulema Castorena is a 22 y.o. female established patient here for ER follow-up.  She was seen 7/28 with abdominal pain, nausea, vomiting and fever.  Evaluation included a CT of the abdomen which was unremarkable, her appendix was well visualized and normal.  Lab work was generally normal as well.  Since her ER visit her abdominal pain has been slowly improving.  She currently has some low-grade discomfort in the lower abdomen, feels somewhat bloated.  She was having diarrhea yesterday but this is now resolved.  She is not had fever or vomiting in more than 24 hours.  She denies any blood or mucus in stool.  She had a pregnancy test completed which was negative.  We also discussed:    Anxiety and depression  Stable on Lexapro 10 mg daily, in need of a refill  Reports stable mood, no recent anxiety.  Appetite and energy level are good.  She is sleeping well       Current medicines (including changes today)  Current Outpatient Prescriptions   Medication Sig Dispense Refill   • escitalopram (LEXAPRO) 10 MG Tab Take 1 Tab by mouth every day. 90 Tab 3   • levonorgestrel-ethinyl estradiol (LUTERA) 0.1-20 MG-MCG per tablet Take 1 Tab by mouth every day. 28 Tab 11   • trazodone (DESYREL) 50 MG Tab TAKE 1 TABLET BY MOUTH EVERY NIGHT AT BEDTIME 30 Tab 5   • silver sulfADIAZINE (SILVADENE) 1 % Cream Apply to skin burn area bid 15 g 0   • metoclopramide (REGLAN) 10 MG Tab Take 1 Tab by mouth 3 times a day before meals. 15 Tab 0     No current facility-administered medications for this visit.      She  has a past medical history of Depression. She also has no past medical history of ASTHMA or Diabetes.    ROS included above     Objective:     Blood pressure 110/66, pulse 92, temperature 36.8 °C (98.2 °F), weight 66.2 kg (146 lb), last menstrual period 07/16/2018, SpO2 96 %. Body mass index is 28.51 kg/m².      Physical Exam:  General: Alert, oriented in no acute distress.  Eye contact is good, speech is normal, affect calm  Lungs: clear to auscultation bilaterally, normal effort, no wheeze/ rhonchi/ rales.  CV: regular rate and rhythm, S1, S2, no murmur  Abdomen: soft, nontender, nondistended, no hepatosplenomegaly  Ext: no edema, color normal, vascularity normal, temperature normal    Assessment and Plan:   The following treatment plan was discussed   1. Abdominal pain, lower   ER notes from 7/28 reviewed.  She did have nausea, vomiting, diarrhea and fever which have since resolved.  Her abdominal discomfort has been gradually improving.   Continue with supportive treatment, encouraged daily probiotic.  Follow-up for recurrence of nausea, vomiting, fever   2. Anxiety and depression  stable  escitalopram (LEXAPRO) 10 MG Tab       Followup: Annually, sooner as needed       Please note that this dictation was created using voice recognition software. I have worked with consultants from the vendor as well as technical experts from [employer] to optimize the interface. I have made every reasonable attempt to correct obvious errors, but I expect that there are errors of grammar and possibly content that I did not discover before finalizing the note.

## 2018-08-01 NOTE — ASSESSMENT & PLAN NOTE
Stable on Lexapro 10 mg daily, in need of a refill  Reports stable mood, no recent anxiety.  Appetite and energy level are good.  She is sleeping well

## 2018-09-18 ENCOUNTER — HOSPITAL ENCOUNTER (OUTPATIENT)
Facility: MEDICAL CENTER | Age: 23
End: 2018-09-18
Attending: NURSE PRACTITIONER
Payer: COMMERCIAL

## 2018-09-18 ENCOUNTER — OFFICE VISIT (OUTPATIENT)
Dept: MEDICAL GROUP | Facility: MEDICAL CENTER | Age: 23
End: 2018-09-18
Payer: COMMERCIAL

## 2018-09-18 VITALS
RESPIRATION RATE: 16 BRPM | SYSTOLIC BLOOD PRESSURE: 108 MMHG | DIASTOLIC BLOOD PRESSURE: 62 MMHG | OXYGEN SATURATION: 98 % | HEIGHT: 60 IN | TEMPERATURE: 98.5 F | WEIGHT: 143.6 LBS | BODY MASS INDEX: 28.19 KG/M2 | HEART RATE: 82 BPM

## 2018-09-18 DIAGNOSIS — Z01.419 ENCOUNTER FOR GYNECOLOGICAL EXAMINATION WITHOUT ABNORMAL FINDING: ICD-10-CM

## 2018-09-18 DIAGNOSIS — Z30.09 FAMILY PLANNING: ICD-10-CM

## 2018-09-18 DIAGNOSIS — Z23 NEED FOR VACCINATION: ICD-10-CM

## 2018-09-18 PROCEDURE — 90471 IMMUNIZATION ADMIN: CPT | Performed by: NURSE PRACTITIONER

## 2018-09-18 PROCEDURE — 99395 PREV VISIT EST AGE 18-39: CPT | Mod: 25 | Performed by: NURSE PRACTITIONER

## 2018-09-18 PROCEDURE — 90686 IIV4 VACC NO PRSV 0.5 ML IM: CPT | Performed by: NURSE PRACTITIONER

## 2018-09-18 PROCEDURE — 88175 CYTOPATH C/V AUTO FLUID REDO: CPT

## 2018-09-18 ASSESSMENT — PATIENT HEALTH QUESTIONNAIRE - PHQ9: CLINICAL INTERPRETATION OF PHQ2 SCORE: 0

## 2018-09-18 NOTE — PROGRESS NOTES
CC:  Pap/Well Woman Exam    History of present illness:  Sulema Castorena is 22 y.o.  female presenting today for well woman exam with gynecological exam and Pap smear.  Menstrual cycles regular, she is on OCP which is working well for her.  Same partner the last 5 years, no concerns for STDs.  Last Pap 3 years ago with normal.  No plans for pregnancy in the next year  No problem-specific Assessment & Plan notes found for this encounter.      Past Medical History:   Diagnosis Date   • Depression        History reviewed. No pertinent surgical history.    Outpatient Encounter Prescriptions as of 2018   Medication Sig Dispense Refill   • escitalopram (LEXAPRO) 10 MG Tab Take 1 Tab by mouth every day. 90 Tab 3   • trazodone (DESYREL) 50 MG Tab TAKE 1 TABLET BY MOUTH EVERY NIGHT AT BEDTIME 30 Tab 5   • levonorgestrel-ethinyl estradiol (LUTERA) 0.1-20 MG-MCG per tablet Take 1 Tab by mouth every day. 28 Tab 11   • silver sulfADIAZINE (SILVADENE) 1 % Cream Apply to skin burn area bid 15 g 0   • metoclopramide (REGLAN) 10 MG Tab Take 1 Tab by mouth 3 times a day before meals. 15 Tab 0     No facility-administered encounter medications on file as of 2018.        Patient Active Problem List    Diagnosis Date Noted   • History of colonoscopy 2015   • Anxiety and depression 2015   • History of self-harm 2015   • Marijuana use 2015   • Intractable migraine without status migrainosus 2015       .  Social History     Social History   • Marital status: Single     Spouse name: N/A   • Number of children: N/A   • Years of education: N/A     Occupational History   • Not on file.     Social History Main Topics   • Smoking status: Never Smoker   • Smokeless tobacco: Never Used   • Alcohol use No   • Drug use: Yes     Types: Marijuana, Inhaled      Comment: THC, daily   • Sexual activity: No     Other Topics Concern   • Not on file     Social History Narrative   • No narrative on file        Family History   Problem Relation Age of Onset   • Alcohol/Drug Father    • Alcohol/Drug Brother          ROS:  Denies Weight loss, fatigue, chest pain, SOB, bowel or bladder changes. No significant dysmenorrhea, concerning vaginal discharge or irritation, no dyspareunia or postcoital bleeding. Denies h/o migraine with aura. Denies musculoskeletal, neurological, or psychiatric problems.      /62   Pulse 82   Temp 36.9 °C (98.5 °F)   Resp 16   Ht 1.524 m (5')   Wt 65.1 kg (143 lb 9.6 oz)   SpO2 98%   BMI 28.04 kg/m²     GEN:  Appears well and in no apparent distress   NECK:  Supple without adenopathy or thyromegaly  LUNGS:  Clear and equal. No wheeze, ronchi, or rales.  CV:  RRR, S1, S2. No murmur.  Pedal pulses 2+ bilaterally.  BREAST:  Symmetrical without masses. No nipple discharge.  ABD:  Soft, non-tender, non-distended, normal bowel sounds.  No hepatosplenomegaly.  :  Normal external female genitalia.  Vaginal canal clear.  Cervix appears normal. Specimen collected from transformation zone. Bimanual exam:  No CMT, normal size uterus without masses or tenderness; no adnexal masses or tenderness.      Assessment and plan    1. Encounter for gynecological examination without abnormal finding  Normal GYN exam. Pap sent, follow-up pending results. Breast self-exam taught and encouraged monthly. General health and wellness discussion including healthy diet, regular exercise. 2000 international units of vitamin D3 advise daily.   - THINPREP PAP,REFLEX HPV ON ASC-US ONLY; Future    2. Family planning  Doing well on oral contraceptive  3. Need for vaccination  I have placed the below orders and discussed them with an approved delegating provider. The MA is performing the below orders under the direction of Dr. Ocasio    - Flu Quad Inj >3 Year Pre-Filled PF      F/u pending results

## 2018-09-19 DIAGNOSIS — Z30.09 FAMILY PLANNING: ICD-10-CM

## 2018-09-19 LAB — CYTOLOGY REG CYTOL: NORMAL

## 2019-05-17 ENCOUNTER — APPOINTMENT (OUTPATIENT)
Dept: URGENT CARE | Facility: CLINIC | Age: 24
End: 2019-05-17
Payer: COMMERCIAL

## 2019-05-17 ENCOUNTER — HOSPITAL ENCOUNTER (EMERGENCY)
Facility: MEDICAL CENTER | Age: 24
End: 2019-05-17
Attending: EMERGENCY MEDICINE
Payer: COMMERCIAL

## 2019-05-17 VITALS
RESPIRATION RATE: 16 BRPM | BODY MASS INDEX: 29.86 KG/M2 | TEMPERATURE: 98.4 F | OXYGEN SATURATION: 98 % | SYSTOLIC BLOOD PRESSURE: 122 MMHG | DIASTOLIC BLOOD PRESSURE: 54 MMHG | HEIGHT: 60 IN | WEIGHT: 152.12 LBS | HEART RATE: 87 BPM

## 2019-05-17 DIAGNOSIS — I88.9 LYMPHADENITIS: ICD-10-CM

## 2019-05-17 LAB
ALBUMIN SERPL BCP-MCNC: 4.4 G/DL (ref 3.2–4.9)
ALBUMIN/GLOB SERPL: 1.5 G/DL
ALP SERPL-CCNC: 33 U/L (ref 30–99)
ALT SERPL-CCNC: 12 U/L (ref 2–50)
ANION GAP SERPL CALC-SCNC: 9 MMOL/L (ref 0–11.9)
AST SERPL-CCNC: 15 U/L (ref 12–45)
BASOPHILS # BLD AUTO: 0.7 % (ref 0–1.8)
BASOPHILS # BLD: 0.06 K/UL (ref 0–0.12)
BILIRUB SERPL-MCNC: 0.5 MG/DL (ref 0.1–1.5)
BUN SERPL-MCNC: 10 MG/DL (ref 8–22)
CALCIUM SERPL-MCNC: 8.7 MG/DL (ref 8.5–10.5)
CHLORIDE SERPL-SCNC: 105 MMOL/L (ref 96–112)
CO2 SERPL-SCNC: 22 MMOL/L (ref 20–33)
CREAT SERPL-MCNC: 0.55 MG/DL (ref 0.5–1.4)
EOSINOPHIL # BLD AUTO: 0.15 K/UL (ref 0–0.51)
EOSINOPHIL NFR BLD: 1.8 % (ref 0–6.9)
ERYTHROCYTE [DISTWIDTH] IN BLOOD BY AUTOMATED COUNT: 41.6 FL (ref 35.9–50)
GLOBULIN SER CALC-MCNC: 2.9 G/DL (ref 1.9–3.5)
GLUCOSE SERPL-MCNC: 85 MG/DL (ref 65–99)
HCT VFR BLD AUTO: 43 % (ref 37–47)
HGB BLD-MCNC: 14.5 G/DL (ref 12–16)
IMM GRANULOCYTES # BLD AUTO: 0.02 K/UL (ref 0–0.11)
IMM GRANULOCYTES NFR BLD AUTO: 0.2 % (ref 0–0.9)
LACTATE BLD-SCNC: 1.3 MMOL/L (ref 0.5–2)
LYMPHOCYTES # BLD AUTO: 2.16 K/UL (ref 1–4.8)
LYMPHOCYTES NFR BLD: 26.4 % (ref 22–41)
MCH RBC QN AUTO: 31 PG (ref 27–33)
MCHC RBC AUTO-ENTMCNC: 33.7 G/DL (ref 33.6–35)
MCV RBC AUTO: 92.1 FL (ref 81.4–97.8)
MONOCYTES # BLD AUTO: 0.57 K/UL (ref 0–0.85)
MONOCYTES NFR BLD AUTO: 7 % (ref 0–13.4)
NEUTROPHILS # BLD AUTO: 5.23 K/UL (ref 2–7.15)
NEUTROPHILS NFR BLD: 63.9 % (ref 44–72)
NRBC # BLD AUTO: 0 K/UL
NRBC BLD-RTO: 0 /100 WBC
PLATELET # BLD AUTO: 346 K/UL (ref 164–446)
PMV BLD AUTO: 8.3 FL (ref 9–12.9)
POTASSIUM SERPL-SCNC: 3.9 MMOL/L (ref 3.6–5.5)
PROT SERPL-MCNC: 7.3 G/DL (ref 6–8.2)
RBC # BLD AUTO: 4.67 M/UL (ref 4.2–5.4)
SODIUM SERPL-SCNC: 136 MMOL/L (ref 135–145)
WBC # BLD AUTO: 8.2 K/UL (ref 4.8–10.8)

## 2019-05-17 PROCEDURE — 83605 ASSAY OF LACTIC ACID: CPT

## 2019-05-17 PROCEDURE — 85025 COMPLETE CBC W/AUTO DIFF WBC: CPT

## 2019-05-17 PROCEDURE — 700105 HCHG RX REV CODE 258: Performed by: EMERGENCY MEDICINE

## 2019-05-17 PROCEDURE — 99284 EMERGENCY DEPT VISIT MOD MDM: CPT

## 2019-05-17 PROCEDURE — 80053 COMPREHEN METABOLIC PANEL: CPT

## 2019-05-17 PROCEDURE — 87040 BLOOD CULTURE FOR BACTERIA: CPT | Mod: 91

## 2019-05-17 RX ORDER — SODIUM CHLORIDE 9 MG/ML
1000 INJECTION, SOLUTION INTRAVENOUS ONCE
Status: COMPLETED | OUTPATIENT
Start: 2019-05-17 | End: 2019-05-17

## 2019-05-17 RX ORDER — IBUPROFEN 600 MG/1
600 TABLET ORAL
Qty: 20 TAB | Refills: 0 | Status: SHIPPED | OUTPATIENT
Start: 2019-05-17 | End: 2019-12-20

## 2019-05-17 RX ORDER — AMOXICILLIN AND CLAVULANATE POTASSIUM 875; 125 MG/1; MG/1
1 TABLET, FILM COATED ORAL 2 TIMES DAILY
Qty: 14 TAB | Refills: 0 | Status: SHIPPED | OUTPATIENT
Start: 2019-05-17 | End: 2019-05-24

## 2019-05-17 RX ADMIN — SODIUM CHLORIDE 1000 ML: 9 INJECTION, SOLUTION INTRAVENOUS at 11:44

## 2019-05-17 NOTE — DISCHARGE INSTRUCTIONS
Like we talked about, I would like you to follow-up with Dr. Munoz this upcoming week for recheck.  I recommend you ask about a mammogram, although this will depend on how you are responding to medication.    Return to the ER sooner for new symptoms or any turn for the worse.

## 2019-05-17 NOTE — ED TRIAGE NOTES
"Chief Complaint   Patient presents with   • Rib Pain     left axillary and breast     Pt reports that it started this morning. Reports that pain is progressing, denies injury pt reports Hx cellulitis to this area in the past, states that it feels \"puffy.\"  /96   Pulse (!) 115   Temp 36.2 °C (97.1 °F) (Temporal)   Resp 16   Ht 1.524 m (5')   Wt 69 kg (152 lb 1.9 oz)   SpO2 99%   Pt informed of wait times. Educated on triage process.  Asked to return to triage RN for any new or worsening of symptoms. Thanked for patience.        "

## 2019-05-22 LAB
BACTERIA BLD CULT: NORMAL
BACTERIA BLD CULT: NORMAL
SIGNIFICANT IND 70042: NORMAL
SIGNIFICANT IND 70042: NORMAL
SITE SITE: NORMAL
SITE SITE: NORMAL
SOURCE SOURCE: NORMAL
SOURCE SOURCE: NORMAL

## 2019-05-23 ENCOUNTER — OFFICE VISIT (OUTPATIENT)
Dept: MEDICAL GROUP | Facility: MEDICAL CENTER | Age: 24
End: 2019-05-23
Payer: COMMERCIAL

## 2019-05-23 VITALS
DIASTOLIC BLOOD PRESSURE: 82 MMHG | HEART RATE: 90 BPM | SYSTOLIC BLOOD PRESSURE: 112 MMHG | OXYGEN SATURATION: 100 % | TEMPERATURE: 97.4 F | HEIGHT: 60 IN | BODY MASS INDEX: 29.45 KG/M2 | RESPIRATION RATE: 16 BRPM | WEIGHT: 150 LBS

## 2019-05-23 DIAGNOSIS — R59.0 LYMPHADENOPATHY, AXILLARY: ICD-10-CM

## 2019-05-23 PROCEDURE — 99214 OFFICE O/P EST MOD 30 MIN: CPT | Performed by: NURSE PRACTITIONER

## 2019-05-23 NOTE — PROGRESS NOTES
Subjective:     Chief Complaint   Patient presents with   • Follow-Up     HOSPITAL [facility], SWELLING ARMPIT AREA, DID BLOOD WORK, PT HAD INF    • Other     LUMPS ON RIGHT SIDE, SORE AREA      Sulema Castorena is a 23 y.o. female established patient here to follow-up on recent ER visit for tenderness and swelling in the left axilla.  I reviewed ER notes and lab work.  She was tachycardic on arrival, received IV fluids and heart rate improved.  She did have blood cultures drawn which were negative.  She was started on Augmentin and has been taking this, she has 1 day remaining on her prescription.  Overall the area is improved although she feels that it is , the left breast is tender underneath as well although she has not palpated any unusual lumps or nodules.  She did have a small amount of white discharge from the nipple a few days ago.  She is currently on her menses, takes an oral contraceptive.  She has history of nipple piercings with no ring present today.  She denies fever, nausea, malaise, myalgia, recent illness including sore throat, congestion, cough.  No family history of breast cancer.  No problem-specific Assessment & Plan notes found for this encounter.       Current medicines (including changes today)  Current Outpatient Prescriptions   Medication Sig Dispense Refill   • amoxicillin-clavulanate (AUGMENTIN) 875-125 MG Tab Take 1 Tab by mouth 2 times a day for 7 days. 14 Tab 0   • VIENVA 0.1-20 MG-MCG per tablet TAKE 1 TABLET BY MOUTH EVERY DAY 84 Tab 3   • escitalopram (LEXAPRO) 10 MG Tab Take 1 Tab by mouth every day. 90 Tab 3   • ibuprofen (MOTRIN) 600 MG Tab Take 1 Tab by mouth 3 times a day, with meals. 20 Tab 0   • trazodone (DESYREL) 50 MG Tab TAKE 1 TABLET BY MOUTH EVERY NIGHT AT BEDTIME 30 Tab 5   • silver sulfADIAZINE (SILVADENE) 1 % Cream Apply to skin burn area bid 15 g 0   • metoclopramide (REGLAN) 10 MG Tab Take 1 Tab by mouth 3 times a day before meals. 15 Tab 0     No  current facility-administered medications for this visit.      She  has a past medical history of Depression. She also has no past medical history of ASTHMA or Diabetes.    ROS included above     Objective:     /82 (BP Location: Left arm, Patient Position: Sitting, BP Cuff Size: Adult)   Pulse 90   Temp 36.3 °C (97.4 °F) (Temporal)   Resp 16   Ht 1.524 m (5')   Wt 68 kg (150 lb)   SpO2 100%  Body mass index is 29.29 kg/m².     Physical Exam:  General: Alert, oriented in no acute distress.  Eye contact is good, speech is normal, affect calm  Lungs: clear to auscultation bilaterally, normal effort, no wheeze/ rhonchi/ rales.  CV: regular rate and rhythm, S1, S2, no murmur  Breast: smooth, symmetrical. No masses noted, no dimpling.  No axillary lymphadenopathy appreciated, no overlying skin changes.  No expressible discharge  Ext: no edema, color normal, vascularity normal, temperature normal    Assessment and Plan:   The following treatment plan was discussed   1. Lymphadenopathy, axillary   ER records reviewed including labs.  White count 8.2 at the time of her visit.  She has been treated with Augmentin which is almost completed.  I appreciate no lymphadenopathy on exam today and I am unable to express any fluid from the nipple.  She will finish out her last day of antibiotic and monitor over the next several days, she will contact me early next week with an update.  If she continues to experience tenderness and swelling we will obtain imaging and repeat labs at that time.       Followup: Patient to call me in 1 week with update         Please note that this dictation was created using voice recognition software. I have worked with consultants from the vendor as well as technical experts from [facility] to optimize the interface. I have made every reasonable attempt to correct obvious errors, but I expect that there are errors of grammar and possibly content that I did not discover before finalizing  the note.

## 2019-07-31 DIAGNOSIS — F41.9 ANXIETY AND DEPRESSION: ICD-10-CM

## 2019-07-31 DIAGNOSIS — F32.A ANXIETY AND DEPRESSION: ICD-10-CM

## 2019-07-31 RX ORDER — ESCITALOPRAM OXALATE 10 MG/1
TABLET ORAL
Qty: 90 TAB | Refills: 3 | Status: SHIPPED | OUTPATIENT
Start: 2019-07-31 | End: 2020-03-11

## 2019-10-07 RX ORDER — TIMOLOL MALEATE 5 MG/ML
SOLUTION/ DROPS OPHTHALMIC
Qty: 84 TAB | Refills: 1 | Status: SHIPPED | OUTPATIENT
Start: 2019-10-07 | End: 2020-03-20

## 2019-11-18 PROCEDURE — 99285 EMERGENCY DEPT VISIT HI MDM: CPT

## 2019-11-18 SDOH — HEALTH STABILITY: MENTAL HEALTH: HOW MANY STANDARD DRINKS CONTAINING ALCOHOL DO YOU HAVE ON A TYPICAL DAY?: 1 OR 2

## 2019-11-18 SDOH — HEALTH STABILITY: MENTAL HEALTH: HOW OFTEN DO YOU HAVE 6 OR MORE DRINKS ON ONE OCCASION?: LESS THAN MONTHLY

## 2019-11-18 SDOH — HEALTH STABILITY: MENTAL HEALTH: HOW OFTEN DO YOU HAVE A DRINK CONTAINING ALCOHOL?: MONTHLY OR LESS

## 2019-11-19 ENCOUNTER — HOSPITAL ENCOUNTER (EMERGENCY)
Facility: MEDICAL CENTER | Age: 24
End: 2019-11-19
Attending: EMERGENCY MEDICINE
Payer: COMMERCIAL

## 2019-11-19 ENCOUNTER — APPOINTMENT (OUTPATIENT)
Dept: RADIOLOGY | Facility: MEDICAL CENTER | Age: 24
End: 2019-11-19
Attending: EMERGENCY MEDICINE
Payer: COMMERCIAL

## 2019-11-19 VITALS
HEIGHT: 60 IN | WEIGHT: 159.39 LBS | SYSTOLIC BLOOD PRESSURE: 118 MMHG | BODY MASS INDEX: 31.29 KG/M2 | HEART RATE: 88 BPM | OXYGEN SATURATION: 95 % | RESPIRATION RATE: 16 BRPM | TEMPERATURE: 98.4 F | DIASTOLIC BLOOD PRESSURE: 72 MMHG

## 2019-11-19 DIAGNOSIS — R10.11 RUQ ABDOMINAL PAIN: ICD-10-CM

## 2019-11-19 DIAGNOSIS — K29.00 ACUTE GASTRITIS, PRESENCE OF BLEEDING UNSPECIFIED, UNSPECIFIED GASTRITIS TYPE: ICD-10-CM

## 2019-11-19 LAB
ALBUMIN SERPL BCP-MCNC: 4.8 G/DL (ref 3.2–4.9)
ALBUMIN/GLOB SERPL: 1.7 G/DL
ALP SERPL-CCNC: 43 U/L (ref 30–99)
ALT SERPL-CCNC: 16 U/L (ref 2–50)
ANION GAP SERPL CALC-SCNC: 10 MMOL/L (ref 0–11.9)
APPEARANCE UR: CLEAR
AST SERPL-CCNC: 19 U/L (ref 12–45)
BASOPHILS # BLD AUTO: 0.5 % (ref 0–1.8)
BASOPHILS # BLD: 0.05 K/UL (ref 0–0.12)
BILIRUB SERPL-MCNC: 0.3 MG/DL (ref 0.1–1.5)
BILIRUB UR QL STRIP.AUTO: NEGATIVE
BUN SERPL-MCNC: 20 MG/DL (ref 8–22)
CALCIUM SERPL-MCNC: 9.6 MG/DL (ref 8.5–10.5)
CHLORIDE SERPL-SCNC: 104 MMOL/L (ref 96–112)
CO2 SERPL-SCNC: 23 MMOL/L (ref 20–33)
COLOR UR: YELLOW
CREAT SERPL-MCNC: 0.63 MG/DL (ref 0.5–1.4)
EOSINOPHIL # BLD AUTO: 0.21 K/UL (ref 0–0.51)
EOSINOPHIL NFR BLD: 2.2 % (ref 0–6.9)
ERYTHROCYTE [DISTWIDTH] IN BLOOD BY AUTOMATED COUNT: 42.2 FL (ref 35.9–50)
GLOBULIN SER CALC-MCNC: 2.9 G/DL (ref 1.9–3.5)
GLUCOSE SERPL-MCNC: 93 MG/DL (ref 65–99)
GLUCOSE UR STRIP.AUTO-MCNC: NEGATIVE MG/DL
HCG SERPL QL: NEGATIVE
HCT VFR BLD AUTO: 43.1 % (ref 37–47)
HGB BLD-MCNC: 14.6 G/DL (ref 12–16)
IMM GRANULOCYTES # BLD AUTO: 0.02 K/UL (ref 0–0.11)
IMM GRANULOCYTES NFR BLD AUTO: 0.2 % (ref 0–0.9)
KETONES UR STRIP.AUTO-MCNC: ABNORMAL MG/DL
LEUKOCYTE ESTERASE UR QL STRIP.AUTO: NEGATIVE
LIPASE SERPL-CCNC: 22 U/L (ref 11–82)
LYMPHOCYTES # BLD AUTO: 2.94 K/UL (ref 1–4.8)
LYMPHOCYTES NFR BLD: 30.4 % (ref 22–41)
MCH RBC QN AUTO: 31.3 PG (ref 27–33)
MCHC RBC AUTO-ENTMCNC: 33.9 G/DL (ref 33.6–35)
MCV RBC AUTO: 92.3 FL (ref 81.4–97.8)
MICRO URNS: ABNORMAL
MONOCYTES # BLD AUTO: 1.02 K/UL (ref 0–0.85)
MONOCYTES NFR BLD AUTO: 10.5 % (ref 0–13.4)
NEUTROPHILS # BLD AUTO: 5.44 K/UL (ref 2–7.15)
NEUTROPHILS NFR BLD: 56.2 % (ref 44–72)
NITRITE UR QL STRIP.AUTO: NEGATIVE
NRBC # BLD AUTO: 0 K/UL
NRBC BLD-RTO: 0 /100 WBC
PH UR STRIP.AUTO: 6.5 [PH] (ref 5–8)
PLATELET # BLD AUTO: 364 K/UL (ref 164–446)
PMV BLD AUTO: 8.6 FL (ref 9–12.9)
POTASSIUM SERPL-SCNC: 4.2 MMOL/L (ref 3.6–5.5)
PROT SERPL-MCNC: 7.7 G/DL (ref 6–8.2)
PROT UR QL STRIP: NEGATIVE MG/DL
RBC # BLD AUTO: 4.67 M/UL (ref 4.2–5.4)
RBC UR QL AUTO: NEGATIVE
SODIUM SERPL-SCNC: 137 MMOL/L (ref 135–145)
SP GR UR STRIP.AUTO: 1.03
UROBILINOGEN UR STRIP.AUTO-MCNC: 0.2 MG/DL
WBC # BLD AUTO: 9.7 K/UL (ref 4.8–10.8)

## 2019-11-19 PROCEDURE — 76705 ECHO EXAM OF ABDOMEN: CPT

## 2019-11-19 PROCEDURE — 700111 HCHG RX REV CODE 636 W/ 250 OVERRIDE (IP): Performed by: EMERGENCY MEDICINE

## 2019-11-19 PROCEDURE — 99285 EMERGENCY DEPT VISIT HI MDM: CPT

## 2019-11-19 PROCEDURE — 83690 ASSAY OF LIPASE: CPT

## 2019-11-19 PROCEDURE — 96374 THER/PROPH/DIAG INJ IV PUSH: CPT

## 2019-11-19 PROCEDURE — 85025 COMPLETE CBC W/AUTO DIFF WBC: CPT

## 2019-11-19 PROCEDURE — A9270 NON-COVERED ITEM OR SERVICE: HCPCS | Performed by: EMERGENCY MEDICINE

## 2019-11-19 PROCEDURE — 84703 CHORIONIC GONADOTROPIN ASSAY: CPT

## 2019-11-19 PROCEDURE — 700111 HCHG RX REV CODE 636 W/ 250 OVERRIDE (IP)

## 2019-11-19 PROCEDURE — 80053 COMPREHEN METABOLIC PANEL: CPT

## 2019-11-19 PROCEDURE — 81003 URINALYSIS AUTO W/O SCOPE: CPT

## 2019-11-19 PROCEDURE — 700102 HCHG RX REV CODE 250 W/ 637 OVERRIDE(OP): Performed by: EMERGENCY MEDICINE

## 2019-11-19 RX ORDER — ONDANSETRON 4 MG/1
TABLET, ORALLY DISINTEGRATING ORAL
Status: COMPLETED
Start: 2019-11-19 | End: 2019-11-19

## 2019-11-19 RX ORDER — KETOROLAC TROMETHAMINE 30 MG/ML
15 INJECTION, SOLUTION INTRAMUSCULAR; INTRAVENOUS ONCE
Status: COMPLETED | OUTPATIENT
Start: 2019-11-19 | End: 2019-11-19

## 2019-11-19 RX ORDER — ONDANSETRON 4 MG/1
4 TABLET, ORALLY DISINTEGRATING ORAL EVERY 8 HOURS PRN
Qty: 10 TAB | Refills: 0 | Status: SHIPPED | OUTPATIENT
Start: 2019-11-19 | End: 2019-12-20

## 2019-11-19 RX ORDER — FAMOTIDINE 20 MG/1
20 TABLET, FILM COATED ORAL ONCE
Status: COMPLETED | OUTPATIENT
Start: 2019-11-19 | End: 2019-11-19

## 2019-11-19 RX ORDER — ONDANSETRON 2 MG/ML
4 INJECTION INTRAMUSCULAR; INTRAVENOUS ONCE
Status: DISCONTINUED | OUTPATIENT
Start: 2019-11-19 | End: 2019-11-19 | Stop reason: HOSPADM

## 2019-11-19 RX ORDER — FAMOTIDINE 40 MG/1
40 TABLET, FILM COATED ORAL DAILY
Qty: 60 TAB | Refills: 0 | Status: SHIPPED | OUTPATIENT
Start: 2019-11-19 | End: 2019-12-20

## 2019-11-19 RX ORDER — ACETAMINOPHEN 500 MG
1000 TABLET ORAL ONCE
Status: COMPLETED | OUTPATIENT
Start: 2019-11-19 | End: 2019-11-19

## 2019-11-19 RX ORDER — ONDANSETRON 4 MG/1
4 TABLET, ORALLY DISINTEGRATING ORAL ONCE
Status: COMPLETED | OUTPATIENT
Start: 2019-11-19 | End: 2019-11-19

## 2019-11-19 RX ADMIN — LIDOCAINE HYDROCHLORIDE 30 ML: 20 SOLUTION OROPHARYNGEAL at 04:15

## 2019-11-19 RX ADMIN — ONDANSETRON 4 MG: 4 TABLET, ORALLY DISINTEGRATING ORAL at 01:51

## 2019-11-19 RX ADMIN — KETOROLAC TROMETHAMINE 15 MG: 30 INJECTION, SOLUTION INTRAMUSCULAR at 02:25

## 2019-11-19 RX ADMIN — ACETAMINOPHEN 1000 MG: 500 TABLET ORAL at 02:37

## 2019-11-19 RX ADMIN — FAMOTIDINE 20 MG: 20 TABLET ORAL at 05:03

## 2019-11-19 NOTE — ED TRIAGE NOTES
Sulema Castorena  Chief Complaint   Patient presents with   • RUQ Pain     Pt complains of stabbing, cramping 7/10 RUQ pain that has been present since 2130hrs after having dinner. Pt endorses nausea, but denies V/D. Pt states last BM was earlier today and WNL. Pt states that pain is when stretching and deep breaths. Pt denies being pregnant, LMP three days ago.      Pt ambulatory to triage with above complaint.     /77   Pulse 98   Temp 36.9 °C (98.4 °F) (Oral)   Resp 20   Ht 1.524 m (5')   Wt 72.3 kg (159 lb 6.3 oz)   LMP 11/11/2019   SpO2 99%   BMI 31.13 kg/m²     Pt informed of triage process and encouraged to notify staff of any changes or concerns. Pt verbalized understanding of instructions. Apologized for long wait time. Pt placed back in lobby.

## 2019-11-19 NOTE — ED NOTES
Pt remains with RUQ pain; GI cocktail ordered by ERP, administered per MAR. Call light within reach.

## 2019-11-19 NOTE — ED NOTES
Pt reporting improvement in nausea, feels able to tolerate PO meds at this time. Pt medicated per MAR, tolerated well. Awaiting US results.

## 2019-11-19 NOTE — ED PROVIDER NOTES
"ED Provider Note    Scribed for Abebe Hill M.D. by Nikita Manriquez. 11/19/2019,  1:08 AM.    Means of Arrival: walk in  History obtained from: patient  History limited by: none    CHIEF COMPLAINT  Chief Complaint   Patient presents with   • RUQ Pain     Pt complains of stabbing, cramping 7/10 RUQ pain that has been present since 2130hrs after having dinner. Pt endorses nausea, but denies V/D. Pt states last BM was earlier today and WNL. Pt states that pain is when stretching and deep breaths. Pt denies being pregnant, LMP three days ago.        HPI  Sulema Castorena is a 24 y.o. female who presents to the Emergency Department with right upper quadrant pain onset around 9:30 PM tonight. The patient was watching movies at home when she developed the pain. She describes it as sharp in quality, and rates it as a 8/10 in severity. She states that when she lies down it feels \"like something is being stretched and wants to pop.\"  She reports associated nausea, as well as one episode of \"very dark red\" stool yesterday. She reports normal bowel movements since then. She denies any fever, vomiting, dysuria, hematuria. She reports a history of GI problems, but notes they typically occur on her left lower quadrant. She denies any history of surgery or ongoing medical issues. She states she very rarely drinks alcohol. Per triage note, her LMP was 3 days ago.    REVIEW OF SYSTEMS  CONSTITUTIONAL:  No fever.  GASTROINTESTINAL:  Right upper quadrant pain, nausea, 1x episode of \"very dark red\" stool. No vomiting.  GENITOURINARY:   No dysuria, hematuria.    See HPI for further details.   All other systems are negative.     PAST MEDICAL HISTORY  Past Medical History:   Diagnosis Date   • Depression        FAMILY HISTORY  Family History   Problem Relation Age of Onset   • Alcohol/Drug Father    • Alcohol/Drug Brother        SOCIAL HISTORY   reports that she has never smoked. She has never used smokeless tobacco. She reports " current alcohol use. She reports current drug use. Drugs: Marijuana and Inhaled.    SURGICAL HISTORY  History reviewed. No pertinent surgical history.    CURRENT MEDICATIONS  Home Medications     Reviewed by Geremias Ashton R.N. (Registered Nurse) on 11/18/19 at 2242  Med List Status: Not Addressed   Medication Last Dose Status   escitalopram (LEXAPRO) 10 MG Tab 11/17/2019 Active   ibuprofen (MOTRIN) 600 MG Tab  Active   metoclopramide (REGLAN) 10 MG Tab  Active   silver sulfADIAZINE (SILVADENE) 1 % Cream  Active   trazodone (DESYREL) 50 MG Tab  Active   VIENVA 0.1-20 MG-MCG per tablet 11/18/2019 Active                ALLERGIES  No Known Allergies    PHYSICAL EXAM  VITAL SIGNS: /77   Pulse 98   Temp 36.9 °C (98.4 °F) (Oral)   Resp 20   Ht 1.524 m (5')   Wt 72.3 kg (159 lb 6.3 oz)   LMP 11/11/2019   SpO2 99%   BMI 31.13 kg/m²    Gen: Alert, no acute distress  HEENT: ATNC  Eyes: PERRL, EOMI, normal conjunctiva.   Neck: trachea midline  Resp: no respiratory distress. Lungs clear  CV: No JVD. Regular rate and rhythm, no murmur  Abd: Greatest tenderness in the RUQ.   Back: No CVA tenderness  Ext: No deformities  Psych: normal mood  Neuro: speech fluent     DIAGNOSTIC STUDIES / PROCEDURES     LABS  Labs Reviewed   CBC WITH DIFFERENTIAL - Abnormal; Notable for the following components:       Result Value    MPV 8.6 (*)     Monos (Absolute) 1.02 (*)     All other components within normal limits   URINALYSIS,CULTURE IF INDICATED - Abnormal; Notable for the following components:    Ketones Trace (*)     All other components within normal limits   COMP METABOLIC PANEL   LIPASE   HCG QUAL SERUM   ESTIMATED GFR     All labs reviewed by me.    RADIOLOGY  US-RUQ   Final Result         1.  Mild hepatomegaly.   2.  Gallbladder polyp versus tumefactive sludge ball.        The radiologist’s interpretation of all radiology studies have been reviewed by me.    COURSE & MEDICAL DECISION MAKING  Pertinent Labs & Imaging  "studies reviewed. (See chart for details)    1:08 AM Patient seen and examined at bedside. Discussed the plan of care, including labs and imaging to evaluate her symptoms and rule out any emergent processes. Patient verbalizes understanding and agreement to this plan of care. Ordered for labs and imaging to evaluate. Patient will be treated with Toradol 15 mg, Tylenol 1 g, Zofran 4 mg for her symptoms.     2:03 AM - Ordered Zofran ODT 4 mg for the patient's nausea.    3:57 AM - Patient seen at bedside. She reports that her nausea is improved, but she still feels some cramping. She rates her pain as a 5/10 at this time. Discussed lab and imaging results with the patient and updated them on the plan of care, including GI cocktail to try and resolve her pain. Patient verbalizes understanding and agreement to this plan of care. Ordered GI cocktail 30 mL for her symptoms.    4:41 AM Patient seen at bedside. She reports feeling improved after intervention with GI cocktail. Updated them on the plan of care, including discharge and outpatient follow up. I answered all questions regarding their care. Patient verbalizes understanding and agreement to this plan of care.       Medical Decision Making:  Patient has right upper quadrant pain and tenderness.  Concerning for possible suspect cholelithiasis or acute cholecystitis.  Labs demonstrate no pancreatitis.  No leukocytosis.  Low suspicion for surgical abdomen.\"  Ultrasound demonstrates polyp versus gallstone.  Patient will be referred to general surgery for this.  Patient felt improved with GI cocktail, suggesting possible gastritis or GERD.  She will be referred to gastroenterology for follow-up and started on Pepcid for this.  No evidence of urinary tract infection/pyelonephritis or ureterolithiasis.    The patient will return for new or worsening symptoms and is stable at the time of discharge. The patient is referred to a primary physician for blood pressure management, " diabetic screening, and for all other preventative health concerns.      DISPOSITION:  Patient will be discharged home in stable condition.    FOLLOW UP:  THERESA Driver.R.N.  [address]  [address]  [facility] [id number] [phone]    In 2 days      Kar Jacobsen M.D.  [address]  [address]  [facility] [id number] [phone]    Schedule an appointment as soon as possible for a visit       [facility], Emergency Dept  [address] [facility] [address] [phone]    If symptoms worsen    [facility]  [address]  [facility] [address] [phone]  Schedule an appointment as soon as possible for a visit   As needed      OUTPATIENT MEDICATIONS:  Discharge Medication List as of 11/19/2019  4:56 AM      START taking these medications    Details   !! ondansetron (ZOFRAN ODT) 4 MG TABLET DISPERSIBLE Take 1 Tab by mouth every 8 hours as needed for Nausea., Disp-10 Tab, R-0, Normal      famotidine (PEPCID) 40 MG Tab Take 1 Tab by mouth every day., Disp-60 Tab, R-0, Normal      !! ondansetron (ZOFRAN ODT) 4 MG TABLET DISPERSIBLE Take 1 Tab by mouth every 8 hours as needed for Nausea., Disp-10 Tab, R-0, Normal       !! - Potential duplicate medications found. Please discuss with provider.            FINAL IMPRESSION  1. RUQ abdominal pain    2. Acute gastritis, presence of bleeding unspecified, unspecified gastritis type            I, Nikita Manriquez (Jud), am scribing for, and in the presence of, Abebe Hill M.D..    Electronically signed by: Nikita Manriquez (Jud), 11/19/2019    IAbebe M.D. personally performed the services described in this documentation, as scribed by Nikita Manriquez in my presence, and it is both accurate and complete. C    The note accurately reflects work and decisions made by me.  Abebe Hill  11/19/2019  7:07 AM

## 2019-11-19 NOTE — ED NOTES
Initial PIV access by this RN unsuccessful, 2x attempts by assist RN unsuccessful. Pt with diaphoresis and vomiting, medicated with ODT zofran per ERP d/t no PIV access at this time. Labs obtained with first PIV attempt.   Pt's visitor with syncopal episode during first PIV attempt, visitor declining medical services/evaluation.

## 2019-11-19 NOTE — ED NOTES
Pt medicated per MAR, tolerated well.  Discharge instructions reviewed with patient. Patient indicates understanding of discharge instructions, follow-up instructions, prescriptions x2, and reasons to return to ER. Patient denies needs or additional questions at this time, no PIV in place at this time. Pt ambulatory without difficulty out of ER in NAD with visitor.

## 2019-11-19 NOTE — DISCHARGE INSTRUCTIONS
You were seen in the emergency department for pain in your abdomen.  Your exam and labs are reassuring.  Your ultrasound showed a possible gallstone or polyp.  This can be followed up by general surgery.  Please schedule appointment with the number provided.    Your symptoms improved with a GI cocktail, which suggests he may have some esophageal reflux or gastritis causing her symptoms.  You are being started on a medication to help control your acid secretion.  You may also take Tums or Maalox as needed.  You are being referred to a gastroenterologist for follow-up.    For pain you can take ibuprofen (Motrin), 600mg every 6 hours as needed for pain (take with food to avoid GI upset). You can also take acetaminophen (Tylenol), 1000mg every 8 hours as needed for pain. Do not take more than 3000mg of acetaminophen in any 24 hour period.  Taking these medications regularly during the day can be very effective in controlling pain.    Please return to the emergency department if you have ongoing vomiting, fevers, worsening severe abdominal pain, or any other new or concerning findings

## 2019-12-04 ENCOUNTER — HOSPITAL ENCOUNTER (OUTPATIENT)
Dept: RADIOLOGY | Facility: MEDICAL CENTER | Age: 24
End: 2019-12-04
Attending: SURGERY
Payer: COMMERCIAL

## 2019-12-04 DIAGNOSIS — R10.9 ABDOMINAL PAIN, UNSPECIFIED ABDOMINAL LOCATION: ICD-10-CM

## 2019-12-04 PROCEDURE — A9537 TC99M MEBROFENIN: HCPCS

## 2019-12-20 DIAGNOSIS — Z01.812 PRE-OPERATIVE LABORATORY EXAMINATION: ICD-10-CM

## 2019-12-20 LAB
ANION GAP SERPL CALC-SCNC: 6 MMOL/L (ref 0–11.9)
BUN SERPL-MCNC: 18 MG/DL (ref 8–22)
CALCIUM SERPL-MCNC: 9.3 MG/DL (ref 8.5–10.5)
CHLORIDE SERPL-SCNC: 107 MMOL/L (ref 96–112)
CO2 SERPL-SCNC: 25 MMOL/L (ref 20–33)
CREAT SERPL-MCNC: 0.59 MG/DL (ref 0.5–1.4)
ERYTHROCYTE [DISTWIDTH] IN BLOOD BY AUTOMATED COUNT: 42.5 FL (ref 35.9–50)
GLUCOSE SERPL-MCNC: 90 MG/DL (ref 65–99)
HCT VFR BLD AUTO: 42.8 % (ref 37–47)
HGB BLD-MCNC: 14.7 G/DL (ref 12–16)
MCH RBC QN AUTO: 32 PG (ref 27–33)
MCHC RBC AUTO-ENTMCNC: 34.3 G/DL (ref 33.6–35)
MCV RBC AUTO: 93 FL (ref 81.4–97.8)
PLATELET # BLD AUTO: 320 K/UL (ref 164–446)
PMV BLD AUTO: 8.6 FL (ref 9–12.9)
POTASSIUM SERPL-SCNC: 4.1 MMOL/L (ref 3.6–5.5)
RBC # BLD AUTO: 4.6 M/UL (ref 4.2–5.4)
SODIUM SERPL-SCNC: 138 MMOL/L (ref 135–145)
WBC # BLD AUTO: 5.7 K/UL (ref 4.8–10.8)

## 2019-12-20 PROCEDURE — 80048 BASIC METABOLIC PNL TOTAL CA: CPT

## 2019-12-20 PROCEDURE — 85027 COMPLETE CBC AUTOMATED: CPT

## 2019-12-20 PROCEDURE — 36415 COLL VENOUS BLD VENIPUNCTURE: CPT

## 2019-12-20 SDOH — HEALTH STABILITY: MENTAL HEALTH: HOW MANY STANDARD DRINKS CONTAINING ALCOHOL DO YOU HAVE ON A TYPICAL DAY?: NOT ASKED

## 2019-12-20 SDOH — HEALTH STABILITY: MENTAL HEALTH: HOW OFTEN DO YOU HAVE 6 OR MORE DRINKS ON ONE OCCASION?: NOT ASKED

## 2019-12-20 SDOH — HEALTH STABILITY: MENTAL HEALTH: HOW OFTEN DO YOU HAVE A DRINK CONTAINING ALCOHOL?: NOT ASKED

## 2019-12-23 ENCOUNTER — HOSPITAL ENCOUNTER (OUTPATIENT)
Facility: MEDICAL CENTER | Age: 24
End: 2019-12-23
Attending: SURGERY | Admitting: SURGERY
Payer: COMMERCIAL

## 2019-12-23 ENCOUNTER — ANESTHESIA (OUTPATIENT)
Dept: SURGERY | Facility: MEDICAL CENTER | Age: 24
End: 2019-12-23
Payer: COMMERCIAL

## 2019-12-23 ENCOUNTER — ANESTHESIA EVENT (OUTPATIENT)
Dept: SURGERY | Facility: MEDICAL CENTER | Age: 24
End: 2019-12-23
Payer: COMMERCIAL

## 2019-12-23 VITALS
SYSTOLIC BLOOD PRESSURE: 113 MMHG | HEIGHT: 60 IN | BODY MASS INDEX: 30.86 KG/M2 | OXYGEN SATURATION: 97 % | WEIGHT: 157.19 LBS | HEART RATE: 103 BPM | RESPIRATION RATE: 18 BRPM | DIASTOLIC BLOOD PRESSURE: 77 MMHG | TEMPERATURE: 98.4 F

## 2019-12-23 DIAGNOSIS — G89.18 ACUTE POSTOPERATIVE ABDOMINAL PAIN: ICD-10-CM

## 2019-12-23 DIAGNOSIS — K82.8 BILIARY DYSKINESIA: ICD-10-CM

## 2019-12-23 DIAGNOSIS — R10.9 ACUTE POSTOPERATIVE ABDOMINAL PAIN: ICD-10-CM

## 2019-12-23 LAB
B-HCG FREE SERPL-ACNC: <5 MIU/ML
IHCGL IHCGL: NEGATIVE MIU/ML

## 2019-12-23 PROCEDURE — 160028 HCHG SURGERY MINUTES - 1ST 30 MINS LEVEL 3: Performed by: SURGERY

## 2019-12-23 PROCEDURE — 700111 HCHG RX REV CODE 636 W/ 250 OVERRIDE (IP): Performed by: ANESTHESIOLOGY

## 2019-12-23 PROCEDURE — 502571 HCHG PACK, LAP CHOLE: Performed by: SURGERY

## 2019-12-23 PROCEDURE — 501399 HCHG SPECIMAN BAG, ENDO CATC: Performed by: SURGERY

## 2019-12-23 PROCEDURE — 160036 HCHG PACU - EA ADDL 30 MINS PHASE I: Performed by: SURGERY

## 2019-12-23 PROCEDURE — 700105 HCHG RX REV CODE 258: Performed by: SURGERY

## 2019-12-23 PROCEDURE — 88304 TISSUE EXAM BY PATHOLOGIST: CPT

## 2019-12-23 PROCEDURE — 501572 HCHG TROCAR, SHIELD OBTU 5X100: Performed by: SURGERY

## 2019-12-23 PROCEDURE — 160009 HCHG ANES TIME/MIN: Performed by: SURGERY

## 2019-12-23 PROCEDURE — 84702 CHORIONIC GONADOTROPIN TEST: CPT

## 2019-12-23 PROCEDURE — A6402 STERILE GAUZE <= 16 SQ IN: HCPCS | Performed by: SURGERY

## 2019-12-23 PROCEDURE — 501838 HCHG SUTURE GENERAL: Performed by: SURGERY

## 2019-12-23 PROCEDURE — 700102 HCHG RX REV CODE 250 W/ 637 OVERRIDE(OP): Performed by: ANESTHESIOLOGY

## 2019-12-23 PROCEDURE — 160039 HCHG SURGERY MINUTES - EA ADDL 1 MIN LEVEL 3: Performed by: SURGERY

## 2019-12-23 PROCEDURE — 501582 HCHG TROCAR, THRD BLADED: Performed by: SURGERY

## 2019-12-23 PROCEDURE — 700101 HCHG RX REV CODE 250: Performed by: ANESTHESIOLOGY

## 2019-12-23 PROCEDURE — 160002 HCHG RECOVERY MINUTES (STAT): Performed by: SURGERY

## 2019-12-23 PROCEDURE — 700101 HCHG RX REV CODE 250: Performed by: SURGERY

## 2019-12-23 PROCEDURE — 160046 HCHG PACU - 1ST 60 MINS PHASE II: Performed by: SURGERY

## 2019-12-23 PROCEDURE — 160025 RECOVERY II MINUTES (STATS): Performed by: SURGERY

## 2019-12-23 PROCEDURE — 160048 HCHG OR STATISTICAL LEVEL 1-5: Performed by: SURGERY

## 2019-12-23 PROCEDURE — 160035 HCHG PACU - 1ST 60 MINS PHASE I: Performed by: SURGERY

## 2019-12-23 PROCEDURE — 700105 HCHG RX REV CODE 258: Performed by: ANESTHESIOLOGY

## 2019-12-23 PROCEDURE — 501583 HCHG TROCAR, THRD CAN&SEAL 5X100: Performed by: SURGERY

## 2019-12-23 PROCEDURE — A9270 NON-COVERED ITEM OR SERVICE: HCPCS | Performed by: ANESTHESIOLOGY

## 2019-12-23 RX ORDER — BUPIVACAINE HYDROCHLORIDE AND EPINEPHRINE 5; 5 MG/ML; UG/ML
INJECTION, SOLUTION EPIDURAL; INTRACAUDAL; PERINEURAL
Status: DISCONTINUED
Start: 2019-12-23 | End: 2019-12-23 | Stop reason: HOSPADM

## 2019-12-23 RX ORDER — OXYCODONE HYDROCHLORIDE 5 MG/1
5 TABLET ORAL EVERY 4 HOURS PRN
Qty: 25 TAB | Refills: 0 | Status: SHIPPED | OUTPATIENT
Start: 2019-12-23 | End: 2019-12-28

## 2019-12-23 RX ORDER — ONDANSETRON 2 MG/ML
INJECTION INTRAMUSCULAR; INTRAVENOUS PRN
Status: DISCONTINUED | OUTPATIENT
Start: 2019-12-23 | End: 2019-12-23

## 2019-12-23 RX ORDER — CEFAZOLIN SODIUM 1 G/3ML
INJECTION, POWDER, FOR SOLUTION INTRAMUSCULAR; INTRAVENOUS PRN
Status: DISCONTINUED | OUTPATIENT
Start: 2019-12-23 | End: 2019-12-23 | Stop reason: SURG

## 2019-12-23 RX ORDER — MIDAZOLAM HYDROCHLORIDE 1 MG/ML
INJECTION INTRAMUSCULAR; INTRAVENOUS PRN
Status: DISCONTINUED | OUTPATIENT
Start: 2019-12-23 | End: 2019-12-23 | Stop reason: SURG

## 2019-12-23 RX ORDER — BUPIVACAINE HYDROCHLORIDE AND EPINEPHRINE 5; 5 MG/ML; UG/ML
INJECTION, SOLUTION EPIDURAL; INTRACAUDAL; PERINEURAL
Status: DISCONTINUED | OUTPATIENT
Start: 2019-12-23 | End: 2019-12-23 | Stop reason: HOSPADM

## 2019-12-23 RX ORDER — OXYCODONE HYDROCHLORIDE 5 MG/1
5 TABLET ORAL EVERY 4 HOURS PRN
Status: DISCONTINUED | OUTPATIENT
Start: 2019-12-23 | End: 2019-12-23 | Stop reason: HOSPADM

## 2019-12-23 RX ORDER — KETOROLAC TROMETHAMINE 30 MG/ML
INJECTION, SOLUTION INTRAMUSCULAR; INTRAVENOUS PRN
Status: DISCONTINUED | OUTPATIENT
Start: 2019-12-23 | End: 2019-12-23 | Stop reason: SURG

## 2019-12-23 RX ORDER — DIPHENHYDRAMINE HYDROCHLORIDE 50 MG/ML
12.5 INJECTION INTRAMUSCULAR; INTRAVENOUS
Status: DISCONTINUED | OUTPATIENT
Start: 2019-12-23 | End: 2019-12-23 | Stop reason: HOSPADM

## 2019-12-23 RX ORDER — ONDANSETRON 2 MG/ML
INJECTION INTRAMUSCULAR; INTRAVENOUS PRN
Status: DISCONTINUED | OUTPATIENT
Start: 2019-12-23 | End: 2019-12-23 | Stop reason: SURG

## 2019-12-23 RX ORDER — SODIUM CHLORIDE, SODIUM LACTATE, POTASSIUM CHLORIDE, CALCIUM CHLORIDE 600; 310; 30; 20 MG/100ML; MG/100ML; MG/100ML; MG/100ML
INJECTION, SOLUTION INTRAVENOUS CONTINUOUS
Status: DISCONTINUED | OUTPATIENT
Start: 2019-12-23 | End: 2019-12-23 | Stop reason: HOSPADM

## 2019-12-23 RX ORDER — LIDOCAINE HYDROCHLORIDE 40 MG/ML
SOLUTION TOPICAL PRN
Status: DISCONTINUED | OUTPATIENT
Start: 2019-12-23 | End: 2019-12-23 | Stop reason: SURG

## 2019-12-23 RX ORDER — DEXAMETHASONE SODIUM PHOSPHATE 4 MG/ML
INJECTION, SOLUTION INTRA-ARTICULAR; INTRALESIONAL; INTRAMUSCULAR; INTRAVENOUS; SOFT TISSUE PRN
Status: DISCONTINUED | OUTPATIENT
Start: 2019-12-23 | End: 2019-12-23 | Stop reason: SURG

## 2019-12-23 RX ORDER — HALOPERIDOL 5 MG/ML
1 INJECTION INTRAMUSCULAR
Status: DISCONTINUED | OUTPATIENT
Start: 2019-12-23 | End: 2019-12-23 | Stop reason: HOSPADM

## 2019-12-23 RX ORDER — ONDANSETRON 2 MG/ML
4 INJECTION INTRAMUSCULAR; INTRAVENOUS
Status: DISCONTINUED | OUTPATIENT
Start: 2019-12-23 | End: 2019-12-23 | Stop reason: HOSPADM

## 2019-12-23 RX ORDER — OXYCODONE HCL 5 MG/5 ML
10 SOLUTION, ORAL ORAL
Status: COMPLETED | OUTPATIENT
Start: 2019-12-23 | End: 2019-12-23

## 2019-12-23 RX ORDER — OXYCODONE HCL 5 MG/5 ML
5 SOLUTION, ORAL ORAL
Status: COMPLETED | OUTPATIENT
Start: 2019-12-23 | End: 2019-12-23

## 2019-12-23 RX ADMIN — FENTANYL CITRATE 25 MCG: 0.05 INJECTION, SOLUTION INTRAMUSCULAR; INTRAVENOUS at 17:04

## 2019-12-23 RX ADMIN — FENTANYL CITRATE 250 MCG: 50 INJECTION, SOLUTION INTRAMUSCULAR; INTRAVENOUS at 15:11

## 2019-12-23 RX ADMIN — LIDOCAINE HYDROCHLORIDE 160 MG: 40 SOLUTION TOPICAL at 15:12

## 2019-12-23 RX ADMIN — ROCURONIUM BROMIDE 20 MG: 10 INJECTION, SOLUTION INTRAVENOUS at 15:11

## 2019-12-23 RX ADMIN — OXYCODONE HYDROCHLORIDE 10 MG: 5 SOLUTION ORAL at 17:48

## 2019-12-23 RX ADMIN — SODIUM CHLORIDE, POTASSIUM CHLORIDE, SODIUM LACTATE AND CALCIUM CHLORIDE: 600; 310; 30; 20 INJECTION, SOLUTION INTRAVENOUS at 14:30

## 2019-12-23 RX ADMIN — CEFAZOLIN 2 G: 330 INJECTION, POWDER, FOR SOLUTION INTRAMUSCULAR; INTRAVENOUS at 15:12

## 2019-12-23 RX ADMIN — ONDANSETRON 4 MG: 2 INJECTION INTRAMUSCULAR; INTRAVENOUS at 15:10

## 2019-12-23 RX ADMIN — PROPOFOL 150 MG: 10 INJECTION, EMULSION INTRAVENOUS at 15:11

## 2019-12-23 RX ADMIN — SODIUM CHLORIDE, POTASSIUM CHLORIDE, SODIUM LACTATE AND CALCIUM CHLORIDE: 600; 310; 30; 20 INJECTION, SOLUTION INTRAVENOUS at 17:06

## 2019-12-23 RX ADMIN — KETOROLAC TROMETHAMINE 30 MG: 30 INJECTION, SOLUTION INTRAMUSCULAR at 16:06

## 2019-12-23 RX ADMIN — FENTANYL CITRATE 50 MCG: 50 INJECTION, SOLUTION INTRAMUSCULAR; INTRAVENOUS at 17:35

## 2019-12-23 RX ADMIN — MIDAZOLAM 2 MG: 1 INJECTION INTRAMUSCULAR; INTRAVENOUS at 15:10

## 2019-12-23 RX ADMIN — DEXAMETHASONE SODIUM PHOSPHATE 8 MG: 4 INJECTION, SOLUTION INTRA-ARTICULAR; INTRALESIONAL; INTRAMUSCULAR; INTRAVENOUS; SOFT TISSUE at 15:11

## 2019-12-23 RX ADMIN — FENTANYL CITRATE 25 MCG: 0.05 INJECTION, SOLUTION INTRAMUSCULAR; INTRAVENOUS at 16:52

## 2019-12-23 ASSESSMENT — PAIN SCALES - GENERAL: PAIN_LEVEL: 2

## 2019-12-23 NOTE — ANESTHESIA PREPROCEDURE EVALUATION
Relevant Problems   NEURO   (+) History of self-harm      CARDIAC   (+) Intractable migraine without status migrainosus       Physical Exam    Airway   Mallampati: II  TM distance: >3 FB  Neck ROM: full       Cardiovascular - normal exam  Rhythm: regular  Rate: normal  (-) murmur     Dental - normal exam         Pulmonary - normal exam  Breath sounds clear to auscultation     Abdominal    Neurological - normal exam                 Anesthesia Plan    ASA 1       Plan - general       Airway plan will be ETT        Induction: intravenous    Postoperative Plan: Postoperative administration of opioids is intended.    Pertinent diagnostic labs and testing reviewed    Informed Consent:    Anesthetic plan and risks discussed with patient.    Use of blood products discussed with: patient whom consented to blood products.

## 2019-12-23 NOTE — ANESTHESIA PROCEDURE NOTES
Airway  Date/Time: 12/23/2019 3:12 PM  Performed by: Miki Meadows M.D.  Authorized by: Miki Meadows M.D.     Location:  OR  Urgency:  Elective  Difficult Airway: No    Indications for Airway Management:  Anesthesia  Spontaneous Ventilation: absent    Sedation Level:  Deep  Preoxygenated: Yes    Patient Position:  Sniffing  Final Airway Type:  Endotracheal airway  Final Endotracheal Airway:  ETT  Cuffed: Yes    Technique Used for Successful ETT Placement:  Direct laryngoscopy  Insertion Site:  Oral  Blade Type:  Arnold  Laryngoscope Blade/Videolaryngoscope Blade Size:  3  ETT Size (mm):  7.5  Measured from:  Lips  ETT to Lips (cm):  22  Placement Verified by: auscultation and capnometry    Cormack-Lehane Classification:  Grade I - full view of glottis  Number of Attempts at Approach:  1

## 2019-12-24 LAB — PATHOLOGY CONSULT NOTE: NORMAL

## 2019-12-24 NOTE — ANESTHESIA POSTPROCEDURE EVALUATION
Patient: Sulema Bull    Procedure Summary     Date:  12/23/19 Room / Location:  [facility] ROOM 22 / SURGERY SAME DAY [facility]    Anesthesia Start:  1505 Anesthesia Stop:  1620    Procedure:  CHOLECYSTECTOMY, LAPAROSCOPIC (N/A Abdomen) Diagnosis:  (BILIARY DYSKINEA)    Surgeon:  Kar Jacobsen M.D. Responsible Provider:  Miki Meadows M.D.    Anesthesia Type:  general ASA Status:  1          Final Anesthesia Type: general  Last vitals  BP    147/84   Temp   36.3 °C (97.4 °F)    Pulse   111   Resp   16    SpO2   96 %      Anesthesia Post Evaluation    Patient location during evaluation: PACU  Patient participation: complete - patient participated  Level of consciousness: awake and alert  Pain score: 2    Airway patency: patent  Anesthetic complications: no  Cardiovascular status: hemodynamically stable  Respiratory status: acceptable  Hydration status: euvolemic    PONV: none

## 2019-12-24 NOTE — OR SURGEON
Immediate Post OP Note    PreOp Diagnosis: Biliary dyskinesia    PostOp Diagnosis: same    Procedure(s):  CHOLECYSTECTOMY, LAPAROSCOPIC - Wound Class: Clean    Surgeon(s):  Kar Jacobsen M.D.    Anesthesiologist/Type of Anesthesia:  Anesthesiologist: Miki Meadows M.D./General    Surgical Staff:  Assistant: JONAS Silva  Circulator: Zonia Gilliland R.N.; Lillie Ndiaye R.N.  Scrub Person: Johny Kothari    Specimens removed if any:  ID Type Source Tests Collected by Time Destination   A :  Tissue Gallbladder PATHOLOGY SPECIMEN Kar Jacobsen M.D. 12/23/2019  3:28 PM        Estimated Blood Loss: < 20 cc    Findings: Dense omental adhesions to the gallbladder, significant scarring of the triangle of Calot and neck of the gallbladder. The cystic artery appeared to be arising from an abberant right hepatic artery.     Complications: none      12/23/2019 4:20 PM Kar Jacobsen M.D.

## 2019-12-24 NOTE — OR SURGEON
DATE OF OPERATION: 12/23/2019    PREOPERATIVE DIAGNOSIS: Biliary dyskinesia, chronic cholecystitis    POSTOPERATIVE DIAGNOSIS: Same    PROCEDURE PERFORMED: Laparoscopic cholecystectomy    SURGEON: Kar Jacobsen MD    ASSISTANT: JONAS Silva    ANESTHESIOLOGIST:  Miki Meadows MD., MD    ANESTHESIA: GETA / local 40 cc 0.5% maracaine with epi    INDICATIONS: The patient is a 24 y.o. female with a history of RUQ pain and abnormal HIDA. She is taken to the operating room for laparoscopic cholecystectomy .     FINDINGS: Dense omental adhesions to the gallbladder, significant scarring of the triangle of Calot and neck of the gallbladder.  The cystic artery appeared to be arising from an abberant right hepatic   artery.  This was carefully dissected out.  There was a second branch to the gallbladder that had clips placed and cut.  The artery then dove into the liver parenchyma    SPECIMEN:  sameer    ESTIMATED BLOOD LOSS: < 20 mL    PROCEDURE: With the patient in supine position, general anesthesia   was administered. Abdomen was prepped with ChloraPrep and widely draped.   Local anesthesia infiltrated below the umbilicus, transverse incision made,   blunt dissection used to the level of fascia. Fascia was cleared. A single   pass made with 11 blade. Clamp was inserted and spread opening remaining   fascia and the peritoneum. 0 Vicryl sutures placed in a figure-of-eight   fashion. A 5 mm port was introduced and the abdomen was insufflated. A   10 mm port was placed in the epigastric position just to the right of the falciform   ligament after instillation of local anesthesia. This was done under direct vision.   Two 5 mm ports were inserted in the right upper quadrant in similar fashion.   The gallbladder was easily identified.  Grasper was then used to grasp it at the   dome.  The adhesions were taken down with blunt and bovie dissection. A second   grasper was used at the neck.The gallbladder was  scored in medial and lateral   sides to increase mobility. Blunt dissection then commenced in the triangle,    staying on the edge of the gallbladder. There was significant scarring present. I was   able to carefully dissect until the cystic duct was clearly identified. Two clips were   placed proximal and one distal on the cystic duct and then cut. The cystic artery   appeared to be arising from an abberant right hepatic artery.  This was carefully   dissected out.  There was a second branch to the gallbladder that had clips placed   and cut.  The artery then dove into the liver parenchyma.  The gallbladder was then   taken down from the liver bed with cautery. It was freed, placed in an EndoCatch and   brought out through the umbilical port site. The port was replaced. The bed of the   gallbladder was reinspected. There was no evidence of bleeding.  The region of the clips   was checked,  there was no evidence of bleeding from the bed of the gallbladder or region   of the clips. There was no bile leak. Fluid in the upper abdomen was evacuated. An Endo   Close device was used at the epigastric port site after it  was removed to reapproximate   the fascia with a 0 Vicryl suture. The 5 mm ports were removed. There was no evidence   of bleeding from the port sites. The umbilical port was removed. The abdomen was   compressed to evacuate all remaining gas. The fascia at the umbilical port site was  reapproximated with previously placed Vicryl suture and checked. There was no evidence   of any defect. All port sites were all irrigated. Skin at all port sites was reapproximated 4-0  Vicryl subcuticular suture. All areas were cleaned and dried. Benzoin and Steri-Strips were   applied followed by gauze and Tegaderm.   The patient tolerated procedure well and was extubated in the OR, brought to recovery room.     ____________________________________     ESTEPHANIA MARTÍNEZ MD    DT: 12/23/2019  4:23 PM

## 2019-12-24 NOTE — ANESTHESIA TIME REPORT
Anesthesia Start and Stop Event Times     Date Time Event    12/23/2019 1447 Ready for Procedure     1505 Anesthesia Start     1620 Anesthesia Stop        Responsible Staff  12/23/19    Name Role Begin End    Miki Meadows M.D. Anesth 1505 1620        Preop Diagnosis (Free Text):  Pre-op Diagnosis     BILIARY DYSKINEA        Preop Diagnosis (Codes):    Post op Diagnosis  Biliary dyskinesia      Premium Reason  A. 3PM - 7AM    Comments:

## 2019-12-24 NOTE — OR NURSING
1645- report received from HEMANTH Hermosillo.    1652- pt medicated for 6/10 pain. Pt tolerating PO water    1704- pt medicated for 5/10 pain in lower right abdomen area.     1735- pt medicated for 7/10 pain    1742-Pt resting, appears comfortable. Respirations even and unlabored. No needs at this time.     1748- pt mediated with oral pain medications    1815- pt meets criteria for Phase 2    1820- pt family notified, on their way.     1900-Discharge orders received. Meets discharge criteria at this time. Tolerating 3/10 pain. No nausea. Tolerating PO. On RA. All lines and monitors discontinued. Reviewed discharge paperwork with significant other. Discussed diet, activity, medications, follow up care and worsening symptoms. No questions at this time. Pt to be discharged to home via private vehicle.

## 2019-12-24 NOTE — OR NURSING
1620- to pacu from or awake but sleepy without airways c/o pain 2/10 scale without nausea. Lap ites x 4 to abd guaze and lefty cdi   1645 ice pack to abd started sips of po fluids   Report to felix bauman who will assume care of pt

## 2019-12-24 NOTE — DISCHARGE INSTRUCTIONS
ACTIVITY: Rest and take it easy for the first 24 hours.  A responsible adult is recommended to remain with you during that time.  It is normal to feel sleepy.  We encourage you to not do anything that requires balance, judgment or coordination.    MILD FLU-LIKE SYMPTOMS ARE NORMAL. YOU MAY EXPERIENCE GENERALIZED MUSCLE ACHES, THROAT IRRITATION, HEADACHE AND/OR SOME NAUSEA.    FOR 24 HOURS DO NOT:  Drive, operate machinery or run household appliances.  Drink beer or alcoholic beverages.   Make important decisions or sign legal documents.    SPECIAL INSTRUCTIONS:   No lifting > 20 lbs x 4 weeks.   Follow up in office in 10 - 14 days.   Ice pack to incisions intermittently to reduce swelling and pain     DIET: To avoid nausea, slowly advance diet as tolerated, avoiding spicy or greasy foods for the first day.  Add more substantial food to your diet according to your physician's instructions.  Babies can be fed formula or breast milk as soon as they are hungry.  INCREASE FLUIDS AND FIBER TO AVOID CONSTIPATION.    SURGICAL DRESSING/BATHING: Patient to remove dressing in 48 - 72 hrs. OK to shower when dressing removed / no bath x 2 weeks.    FOLLOW-UP APPOINTMENT:  A follow-up appointment is arranged with your doctor on January 2nd, 2020.    You should CALL YOUR PHYSICIAN if you develop:  Fever greater than 101 degrees F.  Pain not relieved by medication, or persistent nausea or vomiting.  Excessive bleeding (blood soaking through dressing) or unexpected drainage from the wound.  Extreme redness or swelling around the incision site, drainage of pus or foul smelling drainage.  Inability to urinate or empty your bladder within 8 hours.  Problems with breathing or chest pain.    You should call 911 if you develop problems with breathing or chest pain.  If you are unable to contact your doctor or surgical center, you should go to the nearest emergency room or urgent care center.  Physician's telephone #: [phone]    If  any questions arise, call your doctor.  If your doctor is not available, please feel free to call the Surgical Center at [phone].  The Center is open Monday through Friday from 7AM to 7PM.  You can also call the HEALTH HOTLINE open 24 hours/day, 7 days/week and speak to a nurse at [phone], or toll free at [phone].    A registered nurse may call you a few days after your surgery to see how you are doing after your procedure.    MEDICATIONS: Resume taking daily medication.  Take prescribed pain medication with food.  If no medication is prescribed, you may take non-aspirin pain medication if needed.  PAIN MEDICATION CAN BE VERY CONSTIPATING.  Take a stool softener or laxative such as senokot, pericolace, or milk of magnesia if needed.    Prescription given for oxycodone.  Last pain medication given at 5:45pm.    If your physician has prescribed pain medication that includes Acetaminophen (Tylenol), do not take additional Acetaminophen (Tylenol) while taking the prescribed medication.    Depression / Suicide Risk    As you are discharged from this [facility] facility, it is important to learn how to keep safe from harming yourself.    Recognize the warning signs:  · Abrupt changes in personality, positive or negative- including increase in energy   · Giving away possessions  · Change in eating patterns- significant weight changes-  positive or negative  · Change in sleeping patterns- unable to sleep or sleeping all the time   · Unwillingness or inability to communicate  · Depression  · Unusual sadness, discouragement and loneliness  · Talk of wanting to die  · Neglect of personal appearance   · Rebelliousness- reckless behavior  · Withdrawal from people/activities they love  · Confusion- inability to concentrate     If you or a loved one observes any of these behaviors or has concerns about self-harm, here's what you can do:  · Talk about it- your feelings and reasons for harming  yourself  · Remove any means that you might use to hurt yourself (examples: pills, rope, extension cords, firearm)  · Get professional help from the community (Mental Health, Substance Abuse, psychological counseling)  · Do not be alone:Call your Safe Contact- someone whom you trust who will be there for you.  · Call your local CRISIS HOTLINE 145-0507 or 517-355-3674  · Call your local Children's Mobile Crisis Response Team [place] (663) 140-6137 or www.SingWho  · Call the toll free National Suicide Prevention Hotlines   · National Suicide Prevention Lifeline 246-846-LZZQ (3150)  · National Hope Line Network 800-SUICIDE (791-5720)

## 2020-03-11 ENCOUNTER — OFFICE VISIT (OUTPATIENT)
Dept: MEDICAL GROUP | Facility: MEDICAL CENTER | Age: 25
End: 2020-03-11
Payer: COMMERCIAL

## 2020-03-11 VITALS
RESPIRATION RATE: 16 BRPM | WEIGHT: 150.57 LBS | HEIGHT: 60 IN | OXYGEN SATURATION: 96 % | SYSTOLIC BLOOD PRESSURE: 122 MMHG | HEART RATE: 100 BPM | DIASTOLIC BLOOD PRESSURE: 60 MMHG | BODY MASS INDEX: 29.56 KG/M2 | TEMPERATURE: 98.2 F

## 2020-03-11 DIAGNOSIS — F32.A ANXIETY AND DEPRESSION: ICD-10-CM

## 2020-03-11 DIAGNOSIS — F41.9 ANXIETY AND DEPRESSION: ICD-10-CM

## 2020-03-11 PROCEDURE — 99214 OFFICE O/P EST MOD 30 MIN: CPT | Performed by: NURSE PRACTITIONER

## 2020-03-11 RX ORDER — ESCITALOPRAM OXALATE 20 MG/1
20 TABLET ORAL DAILY
Qty: 90 TAB | Refills: 0 | Status: SHIPPED | OUTPATIENT
Start: 2020-03-11 | End: 2020-06-26

## 2020-03-11 ASSESSMENT — ANXIETY QUESTIONNAIRES
1. FEELING NERVOUS, ANXIOUS, OR ON EDGE: MORE THAN HALF THE DAYS
7. FEELING AFRAID AS IF SOMETHING AWFUL MIGHT HAPPEN: NEARLY EVERY DAY
3. WORRYING TOO MUCH ABOUT DIFFERENT THINGS: SEVERAL DAYS
GAD7 TOTAL SCORE: 16
4. TROUBLE RELAXING: MORE THAN HALF THE DAYS
2. NOT BEING ABLE TO STOP OR CONTROL WORRYING: MORE THAN HALF THE DAYS
5. BEING SO RESTLESS THAT IT IS HARD TO SIT STILL: NEARLY EVERY DAY
6. BECOMING EASILY ANNOYED OR IRRITABLE: NEARLY EVERY DAY

## 2020-03-11 ASSESSMENT — PATIENT HEALTH QUESTIONNAIRE - PHQ9
5. POOR APPETITE OR OVEREATING: 0
6. FEELING BAD ABOUT YOURSELF - OR THAT YOU ARE A FAILURE OR HAVE LET YOURSELF OR YOUR FAMILY DOWN: 1
1. LITTLE INTEREST OR PLEASURE IN DOING THINGS: 0
3. TROUBLE FALLING OR STAYING ASLEEP OR SLEEPING TOO MUCH: 2
2. FEELING DOWN, DEPRESSED, IRRITABLE, OR HOPELESS: 1
SUM OF ALL RESPONSES TO PHQ9 QUESTIONS 1 AND 2: 1
7. TROUBLE CONCENTRATING ON THINGS, SUCH AS READING THE NEWSPAPER OR WATCHING TELEVISION: 0
8. MOVING OR SPEAKING SO SLOWLY THAT OTHER PEOPLE COULD HAVE NOTICED. OR THE OPPOSITE, BEING SO FIGETY OR RESTLESS THAT YOU HAVE BEEN MOVING AROUND A LOT MORE THAN USUAL: 0
4. FEELING TIRED OR HAVING LITTLE ENERGY: 2
9. THOUGHTS THAT YOU WOULD BE BETTER OFF DEAD, OR OF HURTING YOURSELF: 1
SUM OF ALL RESPONSES TO PHQ QUESTIONS 1-9: 7

## 2020-03-11 ASSESSMENT — FIBROSIS 4 INDEX: FIB4 SCORE: 0.37

## 2020-03-11 NOTE — PATIENT INSTRUCTIONS
[facility] [phone]  [facility] [phone]  [facility] [phone]

## 2020-03-12 NOTE — PROGRESS NOTES
Subjective:     Chief Complaint   Patient presents with   • Anxiety     Kaidenehn Beata Bull is a 24 y.o. female here today to follow up on:    Anxiety and depression  Chronic issue currently managed with Lexapro 10 mg daily.  In the last few months she has had additional stress which she feels is now worsening her anxiety.  Her brother recently tried to commit suicide which has been a trigger for her.  She is feeling worried much of the time, unable to relax, has a difficult time going to sleep.  She worries about implausible scenarios.  She has repetitive thoughts of hurting herself although she has not acted on this.  She does have history of cutting but not since her teenage years.  Denies SI/HI, history of manic behavior, substance abuse.  She is interested in counseling as well as medication adjustment  Patient Health Questionaire    Little interest or pleasure in doing things?: 0   Feeling down, depressed, or hopeless?: 1  Trouble falling or staying asleep, or sleeping too much? : 2  Feeling tired or having little energy? : 2  Poor appetite or overeating? : 0  Feeling bad about yourself - or that you are a failure or have let yourself or your family down? : 1  Trouble concentrating on things, such as reading the newspaper or watching television? : 0  Moving or speaking so slowly that other people could have noticed.  Or the opposite - being so fidgety or restless that you have been moving around alot more than usual. : 0  Thoughts that you would be better off dead, or of hurting yourself?: 1  Patient Health Questionnaire Score: 7    If depressive symptoms identified deferred to follow up visit unless specifically addressed in assesment and plan.    Interpretation of PHQ-9 Total Score   Score Severity   1-4 No Depression   5-9 Mild Depression   10-14 Moderate Depression   15-19 Moderately Severe Depression   20-27 Severe Depression    VAN-7 Questionnaire    Feeling nervous, anxious, or on edge: More than  half the days  Not being able to sop or control worrying: More than half the days  Worrying too much about different things: Several days  Trouble relaxing: More than half the days  Being so restless that it's hard to sit still: Nearly every day  Becoming easily annoyed or irritable: Nearly every day  Feeling afraid as if something awful might happen: Nearly every day  Total: 16    Interpretation of VAN 7 Total Score   Score Severity :  0-4 No Anxiety   5-9 Mild Anxiety  10-14 Moderate Anxiety  15-21 Severe Anxiety           Current medicines (including changes today)  Current Outpatient Medications   Medication Sig Dispense Refill   • escitalopram (LEXAPRO) 20 MG tablet Take 1 Tab by mouth every day. 90 Tab 0   • diphenhydrAMINE-APAP, sleep, (TYLENOL PM EXTRA STRENGTH PO) Take 1-2 Tabs by mouth at bedtime as needed.     • VIENVA 0.1-20 MG-MCG per tablet TAKE 1 TABLET BY MOUTH EVERY DAY 84 Tab 1     No current facility-administered medications for this visit.      She  has a past medical history of Depression and Pain (12/20/2019).    ROS included above     Objective:     /60 (BP Location: Right arm, Patient Position: Sitting, BP Cuff Size: Adult)   Pulse 100   Temp 36.8 °C (98.2 °F) (Temporal)   Resp 16   Ht 1.524 m (5')   Wt 68.3 kg (150 lb 9.2 oz)   SpO2 96%  Body mass index is 29.41 kg/m².     Physical Exam:  General: Alert, oriented in no acute distress.  Eye contact is good, speech is normal, affect calm  Lungs: clear to auscultation bilaterally, normal effort, no wheeze/ rhonchi/ rales.  CV: regular rate and rhythm, S1, S2, no murmur  Ext: no edema, color normal, vascularity normal, temperature normal    Assessment and Plan:   The following treatment plan was discussed   1. Anxiety and depression  Increased difficulty over the last several weeks.  States that she has had some thoughts of self-harm but has no plan for self injury or suicide.  She would like to start counseling, phone numbers  provided.  We will also increase dose of Lexapro to 20 mg daily, follow-up in 1 month.  She will contact me in the meantime with any concerns escitalopram (LEXAPRO) 20 MG tablet       Followup: Return in about 1 month (around 4/11/2020).         Please note that this dictation was created using voice recognition software. I have worked with consultants from the vendor as well as technical experts from [facility] to optimize the interface. I have made every reasonable attempt to correct obvious errors, but I expect that there are errors of grammar and possibly content that I did not discover before finalizing the note.

## 2020-03-12 NOTE — ASSESSMENT & PLAN NOTE
Chronic issue currently managed with Lexapro 10 mg daily.  In the last few months she has had additional stress which she feels is now worsening her anxiety.  Her brother recently tried to commit suicide which has been a trigger for her.  She is feeling worried much of the time, unable to relax, has a difficult time going to sleep.  She worries about implausible scenarios.  She has repetitive thoughts of hurting herself although she has not acted on this.  She does have history of cutting but not since her teenage years.  Denies SI/HI, history of manic behavior, substance abuse.  She is interested in counseling as well as medication adjustment  Patient Health Questionaire    Little interest or pleasure in doing things?: 0   Feeling down, depressed, or hopeless?: 1  Trouble falling or staying asleep, or sleeping too much? : 2  Feeling tired or having little energy? : 2  Poor appetite or overeating? : 0  Feeling bad about yourself - or that you are a failure or have let yourself or your family down? : 1  Trouble concentrating on things, such as reading the newspaper or watching television? : 0  Moving or speaking so slowly that other people could have noticed.  Or the opposite - being so fidgety or restless that you have been moving around alot more than usual. : 0  Thoughts that you would be better off dead, or of hurting yourself?: 1  Patient Health Questionnaire Score: 7    If depressive symptoms identified deferred to follow up visit unless specifically addressed in assesment and plan.    Interpretation of PHQ-9 Total Score   Score Severity   1-4 No Depression   5-9 Mild Depression   10-14 Moderate Depression   15-19 Moderately Severe Depression   20-27 Severe Depression    VAN-7 Questionnaire    Feeling nervous, anxious, or on edge: More than half the days  Not being able to sop or control worrying: More than half the days  Worrying too much about different things: Several days  Trouble relaxing: More than half  the days  Being so restless that it's hard to sit still: Nearly every day  Becoming easily annoyed or irritable: Nearly every day  Feeling afraid as if something awful might happen: Nearly every day  Total: 16    Interpretation of VAN 7 Total Score   Score Severity :  0-4 No Anxiety   5-9 Mild Anxiety  10-14 Moderate Anxiety  15-21 Severe Anxiety

## 2020-03-13 ENCOUNTER — TELEPHONE (OUTPATIENT)
Dept: HEALTH INFORMATION MANAGEMENT | Facility: OTHER | Age: 25
End: 2020-03-13

## 2020-03-13 ENCOUNTER — PATIENT MESSAGE (OUTPATIENT)
Dept: MEDICAL GROUP | Facility: MEDICAL CENTER | Age: 25
End: 2020-03-13

## 2020-03-13 NOTE — TELEPHONE ENCOUNTER
1. Caller Name: Pt                    Call Back Number: [phone] (home)   [facility] PCP or Specialty Provider: Yes         2.  Does patient have any active symptoms of respiratory illness (fever OR cough OR shortness of breath)? Yes, the patient reports the following respiratory symptoms: Vomiting diarrhea, feeling hot, cough..    3.  Does patient have any comoribidities? None     4.  In the last 30 days, has the patient traveled outside of the country OR in a high risk area within the  OR have any known contact with someone who has or is suspected to have COVID-19?  Yes, Pt states she was in [facility].    5. POTENTIAL PUI (LOW): Advised to perform self care, monitor for worsening symptoms and to call back in 3 days if no improvement /Pt to monitor symptoms.          Note routed to PCP: FYI only.  Patient states she will need a note from PCP for missing work.  Please forward to MA to contact patient after note completed.

## 2020-03-20 RX ORDER — TIMOLOL MALEATE 5 MG/ML
SOLUTION/ DROPS OPHTHALMIC
Qty: 84 TAB | Refills: 3 | Status: SHIPPED | OUTPATIENT
Start: 2020-03-20 | End: 2021-03-02 | Stop reason: SDUPTHER

## 2020-03-20 NOTE — TELEPHONE ENCOUNTER
Received request via: Pharmacy    Was the patient seen in the last year in this department? Yes 3/11/2020    Does the patient have an active prescription (recently filled or refills available) for medication(s) requested? No

## 2020-04-15 ENCOUNTER — OFFICE VISIT (OUTPATIENT)
Dept: MEDICAL GROUP | Facility: MEDICAL CENTER | Age: 25
End: 2020-04-15
Payer: COMMERCIAL

## 2020-04-15 VITALS
BODY MASS INDEX: 30.21 KG/M2 | SYSTOLIC BLOOD PRESSURE: 110 MMHG | DIASTOLIC BLOOD PRESSURE: 62 MMHG | HEART RATE: 95 BPM | OXYGEN SATURATION: 96 % | RESPIRATION RATE: 16 BRPM | WEIGHT: 153.88 LBS | HEIGHT: 60 IN | TEMPERATURE: 98.1 F

## 2020-04-15 DIAGNOSIS — F41.9 ANXIETY AND DEPRESSION: ICD-10-CM

## 2020-04-15 DIAGNOSIS — F32.A ANXIETY AND DEPRESSION: ICD-10-CM

## 2020-04-15 DIAGNOSIS — F51.04 PSYCHOPHYSIOLOGIC INSOMNIA: ICD-10-CM

## 2020-04-15 PROCEDURE — 99214 OFFICE O/P EST MOD 30 MIN: CPT | Performed by: NURSE PRACTITIONER

## 2020-04-15 RX ORDER — TRAZODONE HYDROCHLORIDE 50 MG/1
50 TABLET ORAL NIGHTLY PRN
Qty: 30 TAB | Refills: 3 | Status: SHIPPED | OUTPATIENT
Start: 2020-04-15 | End: 2020-08-19 | Stop reason: SDUPTHER

## 2020-04-15 ASSESSMENT — PATIENT HEALTH QUESTIONNAIRE - PHQ9
4. FEELING TIRED OR HAVING LITTLE ENERGY: 2
8. MOVING OR SPEAKING SO SLOWLY THAT OTHER PEOPLE COULD HAVE NOTICED. OR THE OPPOSITE, BEING SO FIGETY OR RESTLESS THAT YOU HAVE BEEN MOVING AROUND A LOT MORE THAN USUAL: 0
3. TROUBLE FALLING OR STAYING ASLEEP OR SLEEPING TOO MUCH: 2
SUM OF ALL RESPONSES TO PHQ QUESTIONS 1-9: 8
9. THOUGHTS THAT YOU WOULD BE BETTER OFF DEAD, OR OF HURTING YOURSELF: 0
5. POOR APPETITE OR OVEREATING: 2
SUM OF ALL RESPONSES TO PHQ9 QUESTIONS 1 AND 2: 1
6. FEELING BAD ABOUT YOURSELF - OR THAT YOU ARE A FAILURE OR HAVE LET YOURSELF OR YOUR FAMILY DOWN: 0
2. FEELING DOWN, DEPRESSED, IRRITABLE, OR HOPELESS: 1
1. LITTLE INTEREST OR PLEASURE IN DOING THINGS: 0
7. TROUBLE CONCENTRATING ON THINGS, SUCH AS READING THE NEWSPAPER OR WATCHING TELEVISION: 1

## 2020-04-15 ASSESSMENT — ANXIETY QUESTIONNAIRES
3. WORRYING TOO MUCH ABOUT DIFFERENT THINGS: MORE THAN HALF THE DAYS
4. TROUBLE RELAXING: MORE THAN HALF THE DAYS
6. BECOMING EASILY ANNOYED OR IRRITABLE: MORE THAN HALF THE DAYS
1. FEELING NERVOUS, ANXIOUS, OR ON EDGE: MORE THAN HALF THE DAYS
2. NOT BEING ABLE TO STOP OR CONTROL WORRYING: MORE THAN HALF THE DAYS
GAD7 TOTAL SCORE: 13
5. BEING SO RESTLESS THAT IT IS HARD TO SIT STILL: SEVERAL DAYS
7. FEELING AFRAID AS IF SOMETHING AWFUL MIGHT HAPPEN: MORE THAN HALF THE DAYS

## 2020-04-15 ASSESSMENT — FIBROSIS 4 INDEX: FIB4 SCORE: 0.37

## 2020-04-15 NOTE — ASSESSMENT & PLAN NOTE
Overall feels that she is doing better with increase of Lexapro to 20 mg daily.  Mood has been more stable, overall her anxiety has been more manageable.  She is still having some insomnia, feels that she lies in bed and worries about various issues and is unable to go to sleep.  She will often wake up multiple times through the night  Patient Health Questionaire    Little interest or pleasure in doing things?: 0   Feeling down, depressed, or hopeless?: 1  Trouble falling or staying asleep, or sleeping too much? : 2  Feeling tired or having little energy? : 2  Poor appetite or overeating? : 2  Feeling bad about yourself - or that you are a failure or have let yourself or your family down? : 0  Trouble concentrating on things, such as reading the newspaper or watching television? : 1  Moving or speaking so slowly that other people could have noticed.  Or the opposite - being so fidgety or restless that you have been moving around alot more than usual. : 0  Thoughts that you would be better off dead, or of hurting yourself?: 0  Patient Health Questionnaire Score: 8    If depressive symptoms identified deferred to follow up visit unless specifically addressed in assesment and plan.    Interpretation of PHQ-9 Total Score   Score Severity   1-4 No Depression   5-9 Mild Depression   10-14 Moderate Depression   15-19 Moderately Severe Depression   20-27 Severe Depression    VAN-7 Questionnaire    Feeling nervous, anxious, or on edge: More than half the days  Not being able to sop or control worrying: More than half the days  Worrying too much about different things: More than half the days  Trouble relaxing: More than half the days  Being so restless that it's hard to sit still: Several days  Becoming easily annoyed or irritable: More than half the days  Feeling afraid as if something awful might happen: More than half the days  Total: 13    Interpretation of VAN 7 Total Score   Score Severity :  0-4 No Anxiety   5-9 Mild  Anxiety  10-14 Moderate Anxiety  15-21 Severe Anxiety    No SI/HI, history of manic behavior, substance abuse

## 2020-04-15 NOTE — PROGRESS NOTES
Subjective:     Chief Complaint   Patient presents with   • Follow-Up     medication change      Sulema Bull is a 24 y.o. female here today to follow up on:    Anxiety and depression  Overall feels that she is doing better with increase of Lexapro to 20 mg daily.  Mood has been more stable, overall her anxiety has been more manageable.  She is still having some insomnia, feels that she lies in bed and worries about various issues and is unable to go to sleep.  She will often wake up multiple times through the night  Patient Health Questionaire    Little interest or pleasure in doing things?: 0   Feeling down, depressed, or hopeless?: 1  Trouble falling or staying asleep, or sleeping too much? : 2  Feeling tired or having little energy? : 2  Poor appetite or overeating? : 2  Feeling bad about yourself - or that you are a failure or have let yourself or your family down? : 0  Trouble concentrating on things, such as reading the newspaper or watching television? : 1  Moving or speaking so slowly that other people could have noticed.  Or the opposite - being so fidgety or restless that you have been moving around alot more than usual. : 0  Thoughts that you would be better off dead, or of hurting yourself?: 0  Patient Health Questionnaire Score: 8    If depressive symptoms identified deferred to follow up visit unless specifically addressed in assesment and plan.    Interpretation of PHQ-9 Total Score   Score Severity   1-4 No Depression   5-9 Mild Depression   10-14 Moderate Depression   15-19 Moderately Severe Depression   20-27 Severe Depression    VAN-7 Questionnaire    Feeling nervous, anxious, or on edge: More than half the days  Not being able to sop or control worrying: More than half the days  Worrying too much about different things: More than half the days  Trouble relaxing: More than half the days  Being so restless that it's hard to sit still: Several days  Becoming easily annoyed or irritable:  More than half the days  Feeling afraid as if something awful might happen: More than half the days  Total: 13    Interpretation of VAN 7 Total Score   Score Severity :  0-4 No Anxiety   5-9 Mild Anxiety  10-14 Moderate Anxiety  15-21 Severe Anxiety    No SI/HI, history of manic behavior, substance abuse       Current medicines (including changes today)  Current Outpatient Medications   Medication Sig Dispense Refill   • traZODone (DESYREL) 50 MG Tab Take 1 Tab by mouth at bedtime as needed for Sleep. 30 Tab 3   • VIENVA 0.1-20 MG-MCG per tablet TAKE 1 TABLET BY MOUTH EVERY DAY 84 Tab 3   • escitalopram (LEXAPRO) 20 MG tablet Take 1 Tab by mouth every day. 90 Tab 0   • diphenhydrAMINE-APAP, sleep, (TYLENOL PM EXTRA STRENGTH PO) Take 1-2 Tabs by mouth at bedtime as needed.       No current facility-administered medications for this visit.      She  has a past medical history of Depression and Pain (12/20/2019).    ROS included above     Objective:     /62 (BP Location: Left arm, Patient Position: Sitting, BP Cuff Size: Adult)   Pulse 95   Temp 36.7 °C (98.1 °F)   Resp 16   Ht 1.524 m (5')   Wt 69.8 kg (153 lb 14.1 oz)   SpO2 96%  Body mass index is 30.05 kg/m².   Physical Exam:  Constitutional: Alert, no distress, well-groomed.  Skin: No rashes in visible areas.  Eye: Round. Conjunctiva clear, lids normal. No icterus.   ENMT: Lips pink without lesions, good dentition, moist mucous membranes. Phonation normal.  Neck: No masses, no thyromegaly. Moves freely without pain.  Respiratory: Unlabored respiratory effort, no cough or audible wheeze  Psych: Alert and oriented x3, normal affect and mood.       Assessment and Plan:   The following treatment plan was discussed   1. Psychophysiologic insomnia   uncontrolled, feels that her anxiety is manageable during the day but keeping her awake at night or causing her to wake up multiple times through the night.  We will start trial of:  traZODone (DESYREL) 50 MG  Tab  Discussed sleep hygiene:   - Go to bed and wake up at consistent times whether work/school day or not.    - Keep room dark, quiet, and comfortable.    - No TV/ electronics in bed.  - Don't have naps.  - Avoid stimulant or caffeine use more than 4 hours after wake time.    - Avoid meal or exercise 2-3 hours prior to going to bed.         2. Anxiety and depression   improved with increase of Lexapro, continue 20 mg daily       Followup: As needed         Please note that this dictation was created using voice recognition software. I have worked with consultants from the vendor as well as technical experts from [facility] to optimize the interface. I have made every reasonable attempt to correct obvious errors, but I expect that there are errors of grammar and possibly content that I did not discover before finalizing the note.

## 2020-05-29 ENCOUNTER — HOSPITAL ENCOUNTER (OUTPATIENT)
Dept: RADIOLOGY | Facility: MEDICAL CENTER | Age: 25
End: 2020-05-29
Attending: NURSE PRACTITIONER
Payer: COMMERCIAL

## 2020-05-29 ENCOUNTER — OFFICE VISIT (OUTPATIENT)
Dept: MEDICAL GROUP | Facility: MEDICAL CENTER | Age: 25
End: 2020-05-29
Payer: COMMERCIAL

## 2020-05-29 ENCOUNTER — HOSPITAL ENCOUNTER (OUTPATIENT)
Dept: LAB | Facility: MEDICAL CENTER | Age: 25
End: 2020-05-29
Attending: NURSE PRACTITIONER
Payer: COMMERCIAL

## 2020-05-29 VITALS
BODY MASS INDEX: 24.9 KG/M2 | DIASTOLIC BLOOD PRESSURE: 78 MMHG | OXYGEN SATURATION: 97 % | HEIGHT: 65 IN | SYSTOLIC BLOOD PRESSURE: 120 MMHG | RESPIRATION RATE: 18 BRPM | WEIGHT: 149.47 LBS | TEMPERATURE: 98.4 F | HEART RATE: 93 BPM

## 2020-05-29 DIAGNOSIS — R10.32 LLQ ABDOMINAL PAIN: ICD-10-CM

## 2020-05-29 DIAGNOSIS — R10.30 LOWER ABDOMINAL PAIN: ICD-10-CM

## 2020-05-29 LAB
ALBUMIN SERPL BCP-MCNC: 4.1 G/DL (ref 3.2–4.9)
ALBUMIN/GLOB SERPL: 1.5 G/DL
ALP SERPL-CCNC: 38 U/L (ref 30–99)
ALT SERPL-CCNC: 13 U/L (ref 2–50)
ANION GAP SERPL CALC-SCNC: 10 MMOL/L (ref 7–16)
APPEARANCE UR: CLEAR
AST SERPL-CCNC: 14 U/L (ref 12–45)
BASOPHILS # BLD AUTO: 0.5 % (ref 0–1.8)
BASOPHILS # BLD: 0.04 K/UL (ref 0–0.12)
BILIRUB SERPL-MCNC: 0.3 MG/DL (ref 0.1–1.5)
BILIRUB UR QL STRIP.AUTO: NEGATIVE
BUN SERPL-MCNC: 12 MG/DL (ref 8–22)
CALCIUM SERPL-MCNC: 8.8 MG/DL (ref 8.4–10.2)
CHLORIDE SERPL-SCNC: 107 MMOL/L (ref 96–112)
CO2 SERPL-SCNC: 23 MMOL/L (ref 20–33)
COLOR UR: YELLOW
CREAT SERPL-MCNC: 0.47 MG/DL (ref 0.5–1.4)
EOSINOPHIL # BLD AUTO: 0.17 K/UL (ref 0–0.51)
EOSINOPHIL NFR BLD: 2.1 % (ref 0–6.9)
ERYTHROCYTE [DISTWIDTH] IN BLOOD BY AUTOMATED COUNT: 43.5 FL (ref 35.9–50)
GLOBULIN SER CALC-MCNC: 2.7 G/DL (ref 1.9–3.5)
GLUCOSE SERPL-MCNC: 91 MG/DL (ref 65–99)
GLUCOSE UR STRIP.AUTO-MCNC: NEGATIVE MG/DL
HCT VFR BLD AUTO: 41 % (ref 37–47)
HGB BLD-MCNC: 14 G/DL (ref 12–16)
IMM GRANULOCYTES # BLD AUTO: 0.02 K/UL (ref 0–0.11)
IMM GRANULOCYTES NFR BLD AUTO: 0.2 % (ref 0–0.9)
KETONES UR STRIP.AUTO-MCNC: NEGATIVE MG/DL
LEUKOCYTE ESTERASE UR QL STRIP.AUTO: NEGATIVE
LYMPHOCYTES # BLD AUTO: 1.82 K/UL (ref 1–4.8)
LYMPHOCYTES NFR BLD: 22 % (ref 22–41)
MCH RBC QN AUTO: 30.8 PG (ref 27–33)
MCHC RBC AUTO-ENTMCNC: 34.1 G/DL (ref 33.6–35)
MCV RBC AUTO: 90.3 FL (ref 81.4–97.8)
MICRO URNS: NORMAL
MONOCYTES # BLD AUTO: 0.58 K/UL (ref 0–0.85)
MONOCYTES NFR BLD AUTO: 7 % (ref 0–13.4)
NEUTROPHILS # BLD AUTO: 5.65 K/UL (ref 2–7.15)
NEUTROPHILS NFR BLD: 68.2 % (ref 44–72)
NITRITE UR QL STRIP.AUTO: NEGATIVE
NRBC # BLD AUTO: 0 K/UL
NRBC BLD-RTO: 0 /100 WBC
PH UR STRIP.AUTO: 6.5 [PH] (ref 5–8)
PLATELET # BLD AUTO: 337 K/UL (ref 164–446)
PMV BLD AUTO: 8.2 FL (ref 9–12.9)
POTASSIUM SERPL-SCNC: 4.2 MMOL/L (ref 3.6–5.5)
PROT SERPL-MCNC: 6.8 G/DL (ref 6–8.2)
PROT UR QL STRIP: NEGATIVE MG/DL
RBC # BLD AUTO: 4.54 M/UL (ref 4.2–5.4)
RBC UR QL AUTO: NEGATIVE
SODIUM SERPL-SCNC: 140 MMOL/L (ref 135–145)
SP GR UR STRIP.AUTO: 1.02
WBC # BLD AUTO: 8.3 K/UL (ref 4.8–10.8)

## 2020-05-29 PROCEDURE — 85025 COMPLETE CBC W/AUTO DIFF WBC: CPT

## 2020-05-29 PROCEDURE — 700117 HCHG RX CONTRAST REV CODE 255: Performed by: NURSE PRACTITIONER

## 2020-05-29 PROCEDURE — 81003 URINALYSIS AUTO W/O SCOPE: CPT

## 2020-05-29 PROCEDURE — 36415 COLL VENOUS BLD VENIPUNCTURE: CPT

## 2020-05-29 PROCEDURE — 80053 COMPREHEN METABOLIC PANEL: CPT

## 2020-05-29 PROCEDURE — 74177 CT ABD & PELVIS W/CONTRAST: CPT

## 2020-05-29 PROCEDURE — 99214 OFFICE O/P EST MOD 30 MIN: CPT | Performed by: NURSE PRACTITIONER

## 2020-05-29 RX ADMIN — IOHEXOL 100 ML: 350 INJECTION, SOLUTION INTRAVENOUS at 18:00

## 2020-05-29 RX ADMIN — IOHEXOL 25 ML: 240 INJECTION, SOLUTION INTRATHECAL; INTRAVASCULAR; INTRAVENOUS; ORAL at 18:00

## 2020-05-29 ASSESSMENT — FIBROSIS 4 INDEX: FIB4 SCORE: 0.37

## 2020-05-29 NOTE — PROGRESS NOTES
Subjective:     Chief Complaint   Patient presents with   • Back Pain     lower right back pain   • Abdominal Pain     lower right abdominal pain last week   • Other     vomiting and chills     Sulema Bull is a 24 y.o. female established patient here for evaluation of abdominal pain, subjective fever, vomiting.  Symptoms started approximately a week ago, she began having intense lower left abdominal pain which would wake her up from sleep.  She has had night sweats, believes that she is having fevers.  Her pain has become so intense it causes her nausea and vomiting at times.  Seems to come and go with no identifiable trigger, at some points she has very mild discomfort but then it will again become severe.  Not necessarily associated with eating.  In addition to the left-sided abdominal pain she has had some right mid back pain.  She is having pain with intercourse also in the left lower quadrant.  She denies hematuria, urinary frequency, diarrhea, constipation, blood or mucus in stool, vaginal discharge.  Last menstrual period 2 weeks ago, she is on a birth control pill.  No history of colitis, PID, ovarian cysts, renal calculi.  No concerns for STDs.  No recent travel or sick exposures that she is aware of.    No problem-specific Assessment & Plan notes found for this encounter.       Current medicines (including changes today)  Current Outpatient Medications   Medication Sig Dispense Refill   • traZODone (DESYREL) 50 MG Tab Take 1 Tab by mouth at bedtime as needed for Sleep. 30 Tab 3   • VIENVA 0.1-20 MG-MCG per tablet TAKE 1 TABLET BY MOUTH EVERY DAY 84 Tab 3   • escitalopram (LEXAPRO) 20 MG tablet Take 1 Tab by mouth every day. 90 Tab 0   • diphenhydrAMINE-APAP, sleep, (TYLENOL PM EXTRA STRENGTH PO) Take 1-2 Tabs by mouth at bedtime as needed.       No current facility-administered medications for this visit.      She  has a past medical history of Depression and Pain (12/20/2019).    ROS included  "above     Objective:     /78 (BP Location: Right arm, Patient Position: Sitting, BP Cuff Size: Adult)   Pulse 93   Temp 36.9 °C (98.4 °F) (Temporal)   Resp 18   Ht 1.651 m (5' 5\")   Wt 67.8 kg (149 lb 7.6 oz)   SpO2 97%  Body mass index is 24.87 kg/m².     Physical Exam:  General: Alert, oriented in no acute distress.  Eye contact is good, speech is normal, affect calm  Lungs: clear to auscultation bilaterally, normal effort, no wheeze/ rhonchi/ rales.  CV: regular rate and rhythm, S1, S2, no murmur  Abdomen: soft, nondistended, bilateral lower abdominal tenderness worse on the left.  Normal bowel sounds.  No CVAT  Ext: no edema, color normal, vascularity normal, temperature normal    Assessment and Plan:   The following treatment plan was discussed   1. LLQ abdominal pain   intermittent episodes of severe left lower quadrant pain, tenderness on exam.  I am uncertain what may be causing her symptoms, we will need to evaluate with some imaging and lab work.  Patient is scheduled later today for stat CT, follow-up pending results  CT-ABDOMEN-PELVIS WITH    CBC WITH DIFFERENTIAL    Comp Metabolic Panel    URINALYSIS,CULTURE IF INDICATED       Followup: pending tests         Please note that this dictation was created using voice recognition software. I have worked with consultants from the vendor as well as technical experts from [facility] [employer] to optimize the interface. I have made every reasonable attempt to correct obvious errors, but I expect that there are errors of grammar and possibly content that I did not discover before finalizing the note.       "

## 2020-05-30 ENCOUNTER — HOSPITAL ENCOUNTER (EMERGENCY)
Facility: MEDICAL CENTER | Age: 25
End: 2020-05-30
Attending: EMERGENCY MEDICINE
Payer: COMMERCIAL

## 2020-05-30 ENCOUNTER — APPOINTMENT (OUTPATIENT)
Dept: RADIOLOGY | Facility: MEDICAL CENTER | Age: 25
End: 2020-05-30
Attending: EMERGENCY MEDICINE
Payer: COMMERCIAL

## 2020-05-30 VITALS
WEIGHT: 149.91 LBS | SYSTOLIC BLOOD PRESSURE: 130 MMHG | HEIGHT: 60 IN | RESPIRATION RATE: 20 BRPM | HEART RATE: 82 BPM | BODY MASS INDEX: 29.43 KG/M2 | TEMPERATURE: 98.1 F | OXYGEN SATURATION: 95 % | DIASTOLIC BLOOD PRESSURE: 62 MMHG

## 2020-05-30 DIAGNOSIS — R10.2 PELVIC PAIN: ICD-10-CM

## 2020-05-30 DIAGNOSIS — N83.202 LEFT OVARIAN CYST: ICD-10-CM

## 2020-05-30 LAB
APPEARANCE UR: CLEAR
BASOPHILS # BLD AUTO: 0.5 % (ref 0–1.8)
BASOPHILS # BLD: 0.04 K/UL (ref 0–0.12)
CANDIDA DNA VAG QL PROBE+SIG AMP: NEGATIVE
COLOR UR AUTO: YELLOW
EOSINOPHIL # BLD AUTO: 0.09 K/UL (ref 0–0.51)
EOSINOPHIL NFR BLD: 1.2 % (ref 0–6.9)
ERYTHROCYTE [DISTWIDTH] IN BLOOD BY AUTOMATED COUNT: 43.2 FL (ref 35.9–50)
G VAGINALIS DNA VAG QL PROBE+SIG AMP: NEGATIVE
GLUCOSE UR QL STRIP.AUTO: NEGATIVE MG/DL
HCG UR QL: NEGATIVE
HCT VFR BLD AUTO: 41.3 % (ref 37–47)
HGB BLD-MCNC: 13.9 G/DL (ref 12–16)
IMM GRANULOCYTES # BLD AUTO: 0.02 K/UL (ref 0–0.11)
IMM GRANULOCYTES NFR BLD AUTO: 0.3 % (ref 0–0.9)
KETONES UR QL STRIP.AUTO: 15 MG/DL
LEUKOCYTE ESTERASE UR QL STRIP.AUTO: NEGATIVE
LYMPHOCYTES # BLD AUTO: 2.56 K/UL (ref 1–4.8)
LYMPHOCYTES NFR BLD: 34.5 % (ref 22–41)
MCH RBC QN AUTO: 31 PG (ref 27–33)
MCHC RBC AUTO-ENTMCNC: 33.7 G/DL (ref 33.6–35)
MCV RBC AUTO: 92.2 FL (ref 81.4–97.8)
MONOCYTES # BLD AUTO: 0.56 K/UL (ref 0–0.85)
MONOCYTES NFR BLD AUTO: 7.5 % (ref 0–13.4)
NEUTROPHILS # BLD AUTO: 4.16 K/UL (ref 2–7.15)
NEUTROPHILS NFR BLD: 56 % (ref 44–72)
NITRITE UR QL STRIP.AUTO: NEGATIVE
NRBC # BLD AUTO: 0 K/UL
NRBC BLD-RTO: 0 /100 WBC
PH UR STRIP.AUTO: 5.5 [PH] (ref 5–8)
PLATELET # BLD AUTO: 322 K/UL (ref 164–446)
PMV BLD AUTO: 8.4 FL (ref 9–12.9)
PROT UR QL STRIP: NEGATIVE MG/DL
RBC # BLD AUTO: 4.48 M/UL (ref 4.2–5.4)
RBC UR QL AUTO: NEGATIVE
SP GR UR: >=1.03 (ref 1–1.03)
T VAGINALIS DNA VAG QL PROBE+SIG AMP: NEGATIVE
WBC # BLD AUTO: 7.4 K/UL (ref 4.8–10.8)

## 2020-05-30 PROCEDURE — 87491 CHLMYD TRACH DNA AMP PROBE: CPT

## 2020-05-30 PROCEDURE — 700102 HCHG RX REV CODE 250 W/ 637 OVERRIDE(OP): Performed by: EMERGENCY MEDICINE

## 2020-05-30 PROCEDURE — 81002 URINALYSIS NONAUTO W/O SCOPE: CPT

## 2020-05-30 PROCEDURE — 99285 EMERGENCY DEPT VISIT HI MDM: CPT

## 2020-05-30 PROCEDURE — 96374 THER/PROPH/DIAG INJ IV PUSH: CPT

## 2020-05-30 PROCEDURE — 87480 CANDIDA DNA DIR PROBE: CPT

## 2020-05-30 PROCEDURE — 87591 N.GONORRHOEAE DNA AMP PROB: CPT

## 2020-05-30 PROCEDURE — 81025 URINE PREGNANCY TEST: CPT

## 2020-05-30 PROCEDURE — A9270 NON-COVERED ITEM OR SERVICE: HCPCS | Performed by: EMERGENCY MEDICINE

## 2020-05-30 PROCEDURE — 87660 TRICHOMONAS VAGIN DIR PROBE: CPT

## 2020-05-30 PROCEDURE — 87510 GARDNER VAG DNA DIR PROBE: CPT

## 2020-05-30 PROCEDURE — 85025 COMPLETE CBC W/AUTO DIFF WBC: CPT

## 2020-05-30 PROCEDURE — 76856 US EXAM PELVIC COMPLETE: CPT

## 2020-05-30 PROCEDURE — 700111 HCHG RX REV CODE 636 W/ 250 OVERRIDE (IP): Performed by: EMERGENCY MEDICINE

## 2020-05-30 RX ORDER — IBUPROFEN 800 MG/1
800 TABLET ORAL EVERY 8 HOURS PRN
Qty: 15 TAB | Refills: 0 | Status: SHIPPED | OUTPATIENT
Start: 2020-05-30 | End: 2020-06-02 | Stop reason: SDUPTHER

## 2020-05-30 RX ORDER — KETOROLAC TROMETHAMINE 30 MG/ML
15 INJECTION, SOLUTION INTRAMUSCULAR; INTRAVENOUS ONCE
Status: COMPLETED | OUTPATIENT
Start: 2020-05-30 | End: 2020-05-30

## 2020-05-30 RX ORDER — ONDANSETRON 4 MG/1
4 TABLET, ORALLY DISINTEGRATING ORAL EVERY 8 HOURS PRN
Qty: 6 TAB | Refills: 0 | Status: SHIPPED | OUTPATIENT
Start: 2020-05-30 | End: 2020-06-01

## 2020-05-30 RX ORDER — HYDROCODONE BITARTRATE AND ACETAMINOPHEN 5; 325 MG/1; MG/1
1 TABLET ORAL ONCE
Status: COMPLETED | OUTPATIENT
Start: 2020-05-30 | End: 2020-05-30

## 2020-05-30 RX ADMIN — KETOROLAC TROMETHAMINE 15 MG: 30 INJECTION, SOLUTION INTRAMUSCULAR at 16:18

## 2020-05-30 RX ADMIN — HYDROCODONE BITARTRATE AND ACETAMINOPHEN 1 TABLET: 5; 325 TABLET ORAL at 18:39

## 2020-05-30 ASSESSMENT — LIFESTYLE VARIABLES: DO YOU DRINK ALCOHOL: NO

## 2020-05-30 ASSESSMENT — FIBROSIS 4 INDEX: FIB4 SCORE: 0.29

## 2020-05-30 NOTE — ED TRIAGE NOTES
"Chief Complaint   Patient presents with   • Pelvic Pain   • LLQ Pain   • Chills   Pt to triage in NAD.  Pt reports symptoms x 10 days.  Pt had outpatient CT on 5/29 which showed at \"3.7 cm simple cyst\" on the left ovary.  Pt reports symptoms have been getting worse x 2 days.    "

## 2020-05-30 NOTE — ED PROVIDER NOTES
ED Provider Note    Scribed for Desiree Ivey M.D. by Claudia Marcelino. 5/30/2020  2:44 PM    Primary care provider: ROSAURA Driver  Means of arrival: Walk-in  History obtained from: Patient  History limited by: None  CHIEF COMPLAINT  Chief Complaint   Patient presents with   • Pelvic Pain   • LLQ Pain   • Chills       [facility]  Kaidenn Beata Bull is a 24 y.o. female who presents for to the ER with complaints of left lower abdominal/pelvic pain which began a week ago.  The pain is been constant.  Is been progressively getting worse.  It occasionally radiates into her back.  Sometimes the pain gets so bad it causes nausea and vomiting.  Sometimes she will feel chilled.  She saw her primary care practitioner yesterday who ordered a CT scan of the abdomen pelvis as well as some lab work.  The patient says she looked at her [facility] radhika today and noticed that the CT scan identified a 3.7 cm left ovarian cyst.  Her pain is progressively getting worse so she decided to come in to the ER for further evaluation.  She is never had this pain in the past.  She denies possibility of STDs.  No abnormal vaginal discharge.  No dysuria, hematuria, frequency urgency of urination.  No fevers or chills.  No diarrhea or constipation.  No blood in her stool.         REVIEW OF SYSTEMS  See HPI for further details. All other systems are negative.    PAST MEDICAL HISTORY  Past Medical History:   Diagnosis Date   • Depression     anxiety   • Pain 12/20/2019    abd., 3-4/10       FAMILY HISTORY  Family History   Problem Relation Age of Onset   • Alcohol/Drug Father    • Alcohol/Drug Brother        SOCIAL HISTORY  Social History     Socioeconomic History   • Marital status:      Spouse name: Not on file   • Number of children: Not on file   • Years of education: Not on file   • Highest education level: Not on file   Occupational History   • Not on file   Social Needs   • Financial resource strain: Not on file   • Food  insecurity     Worry: Not on file     Inability: Not on file   • Transportation needs     Medical: Not on file     Non-medical: Not on file   Tobacco Use   • Smoking status: Never Smoker   • Smokeless tobacco: Never Used   Substance and Sexual Activity   • Alcohol use: Not Currently     Alcohol/week: 0.0 oz   • Drug use: Yes     Types: Marijuana, Inhaled, Oral     Comment: marijuana   • Sexual activity: Never     Partners: Male     Birth control/protection: Pill   Lifestyle   • Physical activity     Days per week: Not on file     Minutes per session: Not on file   • Stress: Not on file   Relationships   • Social connections     Talks on phone: Not on file     Gets together: Not on file     Attends Islam service: Not on file     Active member of club or organization: Not on file     Attends meetings of clubs or organizations: Not on file     Relationship status: Not on file   • Intimate partner violence     Fear of current or ex partner: Not on file     Emotionally abused: Not on file     Physically abused: Not on file     Forced sexual activity: Not on file   Other Topics Concern   • Not on file   Social History Narrative   • Not on file       SURGICAL HISTORY  Past Surgical History:   Procedure Laterality Date   • TRISH BY LAPAROSCOPY N/A 12/23/2019    Procedure: CHOLECYSTECTOMY, LAPAROSCOPIC;  Surgeon: Kar Jacobsen M.D.;  Location: SURGERY SAME DAY [facility];  Service: General   • COLONOSCOPY         CURRENT MEDICATIONS  Home Medications     Reviewed by Nicole Bearden (Pharmacy Tech) on 05/30/20 at 1733  Med List Status: Complete   Medication Last Dose Status   escitalopram (LEXAPRO) 20 MG tablet 5/30/2020 Active   traZODone (DESYREL) 50 MG Tab 5/29/2020 Active   VIENVA 0.1-20 MG-MCG per tablet 5/29/2020 Active                ALLERGIES  No Known Allergies    PHYSICAL EXAM  VITAL SIGNS: /62   Pulse 82   Temp 36.7 °C (98.1 °F) (Temporal)   Resp 20   Ht 1.524 m (5')   Wt 68 kg (149 lb  14.6 oz)   SpO2 95%   BMI 29.28 kg/m²      Constitutional: Well developed, well nourished; No acute distress; Non-toxic appearance.   HENT: Normocephalic, atraumatic; Bilateral external ears normal; Oropharynx with moist mucous membranes; No erythema or exudates in the posterior oropharynx.   Eyes: PERRL, EOMI, Conjunctiva normal. No discharge.   Neck:  Supple, nontender midline; No stridor; No nuchal rigidity.   Lymphatic: No cervical lymphadenopathy noted.   Cardiovascular: Regular rate and rhythm without murmurs, rubs, or gallop.   Thorax & Lungs: No respiratory distress, breath sounds clear to auscultation bilaterally without wheezing, rales or rhonchi. Nontender chest wall. No crepitus or subcutaneous air  Abdomen: Soft, tender in the left lower abdomen/pelvis.  Bowel sounds normal. No obvious masses; No pulsatile masses; no rebound, guarding, or peritoneal signs.   Pelvic: External genitalia without lesions or masses.  There is a small amount of white vaginal discharge in the vault.  There is some mild cervical motion tenderness.  There is tenderness in the left adnexal region.  No midline suprapubic tenderness or right adnexal tenderness.  Skin: Good color; warm and dry without rash or petechia.  Back: Nontender, No CVA tenderness.   Extremities: Distal dorsalis pedis, posterior tibial pulses are equal bilaterally; No edema; Nontender calves or saphenous, No cyanosis, No clubbing.   Musculoskeletal: Good range of motion in all major joints. No tenderness to palpation or major deformities noted.   Neurologic: Alert & oriented x 4, clear speech        LABS/RADIOLOGY/PROCEDURES  Results for orders placed or performed during the hospital encounter of 05/30/20   Chlamydia/GC PCR Urine Or Swab    Specimen: Genital   Result Value Ref Range    Source Vaginal    CBC WITH DIFFERENTIAL   Result Value Ref Range    WBC 7.4 4.8 - 10.8 K/uL    RBC 4.48 4.20 - 5.40 M/uL    Hemoglobin 13.9 12.0 - 16.0 g/dL    Hematocrit  41.3 37.0 - 47.0 %    MCV 92.2 81.4 - 97.8 fL    MCH 31.0 27.0 - 33.0 pg    MCHC 33.7 33.6 - 35.0 g/dL    RDW 43.2 35.9 - 50.0 fL    Platelet Count 322 164 - 446 K/uL    MPV 8.4 (L) 9.0 - 12.9 fL    Neutrophils-Polys 56.00 44.00 - 72.00 %    Lymphocytes 34.50 22.00 - 41.00 %    Monocytes 7.50 0.00 - 13.40 %    Eosinophils 1.20 0.00 - 6.90 %    Basophils 0.50 0.00 - 1.80 %    Immature Granulocytes 0.30 0.00 - 0.90 %    Nucleated RBC 0.00 /100 WBC    Neutrophils (Absolute) 4.16 2.00 - 7.15 K/uL    Lymphs (Absolute) 2.56 1.00 - 4.80 K/uL    Monos (Absolute) 0.56 0.00 - 0.85 K/uL    Eos (Absolute) 0.09 0.00 - 0.51 K/uL    Baso (Absolute) 0.04 0.00 - 0.12 K/uL    Immature Granulocytes (abs) 0.02 0.00 - 0.11 K/uL    NRBC (Absolute) 0.00 K/uL   Vaginal Pathogens DNA Panel   Result Value Ref Range    Candida species DNA Probe Negative Negative    Trichamonas vaginalis DNA Probe Negative Negative    Gardnerella vaginalis DNA Probe Negative Negative   POC URINE PREGNANCY   Result Value Ref Range    POC Urine Pregnancy Test Negative Negative   POC UA   Result Value Ref Range    POC Color Yellow     POC Appearance Clear     POC Glucose Negative Negative mg/dL    POC Ketones 15 (A) Negative mg/dL    POC Specific Gravity >=1.030 (A) 1.005 - 1.030    POC Blood Negative Negative    POC Urine PH 5.5 5.0 - 8.0    POC Protein Negative Negative mg/dL    POC Nitrites Negative Negative    POC Leukocyte Esterase Negative Negative       US-PELVIC COMPLETE (TRANSABDOMINAL/TRANSVAGINAL) (COMBO)   Final Result      1.  Negative for torsion      2.  4.0 cm simple left adnexal cyst      IMPRESSION:     1.  Negative contrast-enhanced CT of the abdomen and pelvis.     2.  No evidence of bowel or renal obstruction.     3.  Normal appearance of the appendix.     4.  Sigmoid diverticulosis. No CT evidence of diverticulitis.     5.  3.7 cm in diameter simple cyst in the left ovary. No free fluid.     6.  Cholecystectomy.    COURSE & MEDICAL DECISION  MAKING  Pertinent Labs & Imaging studies reviewed. (See chart for details)    Reviewed patient's old medical records which showed patient was seen outpatient yesterday by her APRN complaining of abdominal pain, vomiting, chills, and pain with intercourse. CT abdomen pelvis was normal except a 3.7 cm left ovarian cyst.     2:44 PM - Patient seen and examined at bedside. Discussed plan of care.      Patient presents to the ER complaining of progressively worsening left lower quadrant/left pelvic pain over the last 1 week.  Pain is been constant.  She was seen yesterday by her primary care practitioner.  A CT scan of the abdomen pelvis was ordered.  The CT scan was essentially unremarkable except for 3.7 cm diameter simple cyst in the left ovary.  No free fluid.  Patient noticed to be resolved on the "[employer] radhika today and since her pain seem to be getting worse, she came to the ER for evaluation.  Her urine is clear.  She is not pregnant.  She adamantly denies possibility of STDs.  She is monogamous with her .  White count is normal.  She had some lab work done yesterday which was ordered by her primary care practitioner.  That lab work was reviewed and is unremarkable as well.  Pelvic ultrasound was ordered to evaluate for the possibility of torsion.  Ultrasound reveals a 4.0 cm simple left ovarian cyst.  There is no evidence of torsion.  Patient was given some Toradol here in the ER.  She says it helped with the pain a little bit.  I will go ahead and give her a Norco here to further help with her discomfort.  Plan is to contact gynecology to get her close follow-up.  At this time I think it is safe to discharge her home.  I suspect her pain is related to her left ovarian cyst.  GC chlamydia test are pending.  She would prefer to defer empiric antibiotic therapy as she is not at all concerned about STDs.  She prefer to just wait for the results to come back later tonight or tomorrow and agrees to seek  treatment if the test come back positive, though she does not feel there is a possibility of those test being positive.  Patient has been instructed to return to the ER for any worsening pain, changing pain, dizziness or lightheadedness, fevers, chills, vomiting, or for any concerns.  Otherwise she will follow-up with gynecology this coming week.  Patient understands treatment plan and follow-up.      1845: I discussed the case with Dr. Franco, gynecologist on-call.  She will have her office staff contact the patient on Monday to give her an appointment time for early this coming week for follow-up of her 4 cm left ovarian cyst.    FINAL IMPRESSION  1. Pelvic pain Acute   2. Left ovarian cyst Acute          This dictation has been created using voice recognition software. The accuracy of the dictation is limited by the abilities of the software. I expect there may be some errors of grammar and possibly content. I made every attempt to manually correct the errors within my dictation. However, errors related to voice recognition software may still exist and should be interpreted within the appropriate context.     Claudia BARRETO), am scribing for, and in the presence of, Desiree Ivey M.D..    Electronically signed by: Claudia Henderson), 5/30/2020    Desiree BARRETO M.D. personally performed the services described in this documentation, as scribed by Claudia Marcelino in my presence, and it is both accurate and complete.    The note accurately reflects work and decisions made by me.  Desiree Ivey M.D.  5/30/2020  5:05 PM

## 2020-05-31 NOTE — ED NOTES
Pt to DC home with instructions to follow up with GYN. Worsening s/s discussed. PT educated on when to return to ED. 2 paper prescriptions provided @ DC and pt understanding to take those pt a pharmacy to obtain medications. Ambulatory to ED lobby on DC. She has called spouse for a ride home.

## 2020-05-31 NOTE — DISCHARGE INSTRUCTIONS
Follow-up with Dr. Franco, gynecologist, this coming week.  Her office staff will call you on Monday for your appointment time.  If you do not hear from her office staff by Monday afternoon, please call for your appointment.    Return to the ER for any worsening pain, changing pain, vaginal bleeding, abnormal vaginal discharge, worsening nausea/vomiting, fevers over 100.4, chills, or for any concerns.

## 2020-06-01 LAB
C TRACH DNA SPEC QL NAA+PROBE: NEGATIVE
N GONORRHOEA DNA SPEC QL NAA+PROBE: NEGATIVE
SPECIMEN SOURCE: NORMAL

## 2020-06-02 ENCOUNTER — OFFICE VISIT (OUTPATIENT)
Dept: MEDICAL GROUP | Facility: MEDICAL CENTER | Age: 25
End: 2020-06-02
Payer: COMMERCIAL

## 2020-06-02 VITALS
RESPIRATION RATE: 18 BRPM | BODY MASS INDEX: 29.61 KG/M2 | TEMPERATURE: 97.9 F | HEIGHT: 60 IN | HEART RATE: 101 BPM | WEIGHT: 150.79 LBS | SYSTOLIC BLOOD PRESSURE: 130 MMHG | OXYGEN SATURATION: 98 % | DIASTOLIC BLOOD PRESSURE: 68 MMHG

## 2020-06-02 DIAGNOSIS — N83.202 LEFT OVARIAN CYST: ICD-10-CM

## 2020-06-02 PROCEDURE — 99213 OFFICE O/P EST LOW 20 MIN: CPT | Performed by: NURSE PRACTITIONER

## 2020-06-02 RX ORDER — IBUPROFEN 800 MG/1
800 TABLET ORAL EVERY 8 HOURS PRN
Qty: 90 TAB | Refills: 0 | Status: SHIPPED | OUTPATIENT
Start: 2020-06-02 | End: 2020-07-06

## 2020-06-02 ASSESSMENT — FIBROSIS 4 INDEX: FIB4 SCORE: 0.3

## 2020-06-02 NOTE — PROGRESS NOTES
Subjective:     Chief Complaint   Patient presents with   • Follow-Up     ER saturday   • Lab Results     Sulema Bull is a 24 y.o. female here today to follow up on:    Left ovarian cyst  Patient was seen in clinic last week with concerns of left lower quadrant pain.  Lab work was generally unremarkable, we did obtain a CT scan which showed a 3.7 cm simple cyst on the left ovary.  Her pain worsened over the weekend and she did present to ER, ultrasound was completed at that time and cyst was measuring at 4.0 cm.  She was given prescription ibuprofen and able to discharge.  Her pain is coming and going, described as cramping.  She notes that intercourse seems to make it worse.  She is due for her period in approximately 1 week.  She has follow-up planned with OB/GYN next week as well  No nausea, vomiting, stool changes       Current medicines (including changes today)  Current Outpatient Medications   Medication Sig Dispense Refill   • ibuprofen (MOTRIN) 800 MG Tab Take 1 Tab by mouth every 8 hours as needed for up to 5 days. 90 Tab 0   • traZODone (DESYREL) 50 MG Tab Take 1 Tab by mouth at bedtime as needed for Sleep. 30 Tab 3   • VIENVA 0.1-20 MG-MCG per tablet TAKE 1 TABLET BY MOUTH EVERY DAY 84 Tab 3   • escitalopram (LEXAPRO) 20 MG tablet Take 1 Tab by mouth every day. 90 Tab 0     No current facility-administered medications for this visit.      She  has a past medical history of Depression and Pain (12/20/2019).    ROS included above     Objective:     /68 (BP Location: Right arm, Patient Position: Sitting, BP Cuff Size: Adult)   Pulse (!) 101   Temp 36.6 °C (97.9 °F) (Temporal)   Resp 18   Ht 1.524 m (5')   Wt 68.4 kg (150 lb 12.7 oz)   SpO2 98%  Body mass index is 29.45 kg/m².     Physical Exam:  General: Alert, oriented in no acute distress.  Eye contact is good, speech is normal, affect calm  Lungs: clear to auscultation bilaterally, normal effort, no wheeze/ rhonchi/ rales.  CV:  regular rate and rhythm, S1, S2, no murmur  Ext: no edema, color normal, vascularity normal, temperature normal    Assessment and Plan:   The following treatment plan was discussed  1. Left ovarian cyst   recent CT scan reviewed.  We discussed that ovarian cysts are common and often resolve without intervention.  She has follow-up planned with OB/GYN and I anticipate that they will arrange for follow-up ultrasound.  In the meantime she may use ibuprofen as needed with food for the discomfort.  Continue OCP.  She will contact me with any new concerns  ibuprofen (MOTRIN) 800 MG Tab       Followup: As needed         Please note that this dictation was created using voice recognition software. I have worked with consultants from the vendor as well as technical experts from [employer] [facility] [employer] to optimize the interface. I have made every reasonable attempt to correct obvious errors, but I expect that there are errors of grammar and possibly content that I did not discover before finalizing the note.

## 2020-06-02 NOTE — LETTER
June 2, 2020        RE: Sulema Bull    To Whom It May Concern;    Kaidenuma Bull is seen in my office for primary care. Please excuse her work absence 5/30/2020 and 5/31/2020 due to illness.    Sincerely,            Yani STREETER

## 2020-06-02 NOTE — ASSESSMENT & PLAN NOTE
Patient was seen in clinic last week with concerns of left lower quadrant pain.  Lab work was generally unremarkable, we did obtain a CT scan which showed a 3.7 cm simple cyst on the left ovary.  Her pain worsened over the weekend and she did present to ER, ultrasound was completed at that time and cyst was measuring at 4.0 cm.  She was given prescription ibuprofen and able to discharge.  Her pain is coming and going, described as cramping.  She notes that intercourse seems to make it worse.  She is due for her period in approximately 1 week.  She has follow-up planned with OB/GYN next week as well  No nausea, vomiting, stool changes

## 2020-06-24 ENCOUNTER — HOSPITAL ENCOUNTER (OUTPATIENT)
Dept: RADIOLOGY | Facility: MEDICAL CENTER | Age: 25
End: 2020-06-24
Attending: FAMILY MEDICINE
Payer: COMMERCIAL

## 2020-06-24 ENCOUNTER — OFFICE VISIT (OUTPATIENT)
Dept: URGENT CARE | Facility: PHYSICIAN GROUP | Age: 25
End: 2020-06-24
Payer: COMMERCIAL

## 2020-06-24 VITALS
TEMPERATURE: 97.4 F | HEIGHT: 60 IN | OXYGEN SATURATION: 99 % | RESPIRATION RATE: 16 BRPM | HEART RATE: 101 BPM | DIASTOLIC BLOOD PRESSURE: 62 MMHG | WEIGHT: 150 LBS | BODY MASS INDEX: 29.45 KG/M2 | SYSTOLIC BLOOD PRESSURE: 104 MMHG

## 2020-06-24 DIAGNOSIS — J98.01 BRONCHOSPASM: ICD-10-CM

## 2020-06-24 DIAGNOSIS — J22 ACUTE RESPIRATORY INFECTION: ICD-10-CM

## 2020-06-24 DIAGNOSIS — J45.909 BRONCHITIS, ALLERGIC, UNSPECIFIED ASTHMA SEVERITY, UNCOMPLICATED: ICD-10-CM

## 2020-06-24 PROCEDURE — 71046 X-RAY EXAM CHEST 2 VIEWS: CPT

## 2020-06-24 PROCEDURE — 99204 OFFICE O/P NEW MOD 45 MIN: CPT | Performed by: FAMILY MEDICINE

## 2020-06-24 RX ORDER — BENZONATATE 100 MG/1
100 CAPSULE ORAL 3 TIMES DAILY PRN
Qty: 30 CAP | Refills: 0 | Status: SHIPPED | OUTPATIENT
Start: 2020-06-24 | End: 2021-04-21

## 2020-06-24 RX ORDER — ALBUTEROL SULFATE 90 UG/1
1-2 AEROSOL, METERED RESPIRATORY (INHALATION) EVERY 4 HOURS PRN
Qty: 1 INHALER | Refills: 0 | Status: SHIPPED | OUTPATIENT
Start: 2020-06-24 | End: 2022-01-23

## 2020-06-24 ASSESSMENT — ENCOUNTER SYMPTOMS
SHORTNESS OF BREATH: 1
DIZZINESS: 0
VOMITING: 0
MYALGIAS: 0
COUGH: 1
FEVER: 0
EYE REDNESS: 0
NAUSEA: 0
SORE THROAT: 0
SPUTUM PRODUCTION: 0
CHILLS: 0

## 2020-06-24 ASSESSMENT — FIBROSIS 4 INDEX: FIB4 SCORE: 0.3

## 2020-06-24 NOTE — PROGRESS NOTES
Subjective:   Sulema Bull is a 24 y.o. female who presents for Cough (onset last week, got covid test done this week was negative)        24-year-old female presents to urgent care with chief complaint of a cough over the past week.  Patient recently has had a SARS-CoV-2 PCR serology test performed which was negative.  Patient complains of a nonproductive cough with intermittent dyspnea with the cough.  The patient denies fever chills or sweats.  Patient denies chest pain.    Cough   This is a new problem. The current episode started in the past 7 days (exposure to environmental pollens). The problem has been unchanged. The cough is non-productive. Associated symptoms include shortness of breath. Pertinent negatives include no chest pain, chills, eye redness, fever, myalgias, rash or sore throat. She has tried nothing for the symptoms.     PMH:  has a past medical history of Depression and Pain (12/20/2019).  MEDS:   Current Outpatient Medications:   •  benzonatate (TESSALON) 100 MG Cap, Take 1 Cap by mouth 3 times a day as needed for Cough., Disp: 30 Cap, Rfl: 0  •  albuterol 108 (90 Base) MCG/ACT Aero Soln inhalation aerosol, Inhale 1-2 Puffs by mouth every four hours as needed for Shortness of Breath., Disp: 1 Inhaler, Rfl: 0  •  traZODone (DESYREL) 50 MG Tab, Take 1 Tab by mouth at bedtime as needed for Sleep., Disp: 30 Tab, Rfl: 3  •  VIENVA 0.1-20 MG-MCG per tablet, TAKE 1 TABLET BY MOUTH EVERY DAY, Disp: 84 Tab, Rfl: 3  •  escitalopram (LEXAPRO) 20 MG tablet, Take 1 Tab by mouth every day., Disp: 90 Tab, Rfl: 0  ALLERGIES: No Known Allergies  SURGHX:   Past Surgical History:   Procedure Laterality Date   • TRISH BY LAPAROSCOPY N/A 12/23/2019    Procedure: CHOLECYSTECTOMY, LAPAROSCOPIC;  Surgeon: Kar Jacobsen M.D.;  Location: SURGERY SAME DAY [facility];  Service: General   • COLONOSCOPY       SOCHX:  reports that she has never smoked. She has never used smokeless tobacco. She reports  previous alcohol use. She reports current drug use. Drugs: Marijuana, Inhaled, and Oral.  FH:   Family History   Problem Relation Age of Onset   • Alcohol/Drug Father    • Alcohol/Drug Brother      Review of Systems   Constitutional: Negative for chills and fever.   HENT: Negative for sore throat.    Eyes: Negative for redness.   Respiratory: Positive for cough and shortness of breath. Negative for sputum production.    Cardiovascular: Negative for chest pain.   Gastrointestinal: Negative for nausea and vomiting.   Genitourinary: Negative for dysuria.   Musculoskeletal: Negative for myalgias.   Skin: Negative for rash.   Neurological: Negative for dizziness.   All other systems reviewed and are negative.       Objective:   /62   Pulse (!) 101   Temp 36.3 °C (97.4 °F)   Resp 16   Ht 1.524 m (5')   Wt 68 kg (150 lb)   SpO2 99%   BMI 29.29 kg/m²   Physical Exam  Vitals signs and nursing note reviewed.   Constitutional:       General: She is not in acute distress.     Appearance: Normal appearance. She is well-developed.   HENT:      Head: Normocephalic and atraumatic.      Right Ear: External ear normal.      Left Ear: External ear normal.      Nose: Nose normal.      Mouth/Throat:      Mouth: Mucous membranes are moist.   Eyes:      Conjunctiva/sclera: Conjunctivae normal.   Cardiovascular:      Rate and Rhythm: Normal rate.   Pulmonary:      Effort: Pulmonary effort is normal. No respiratory distress.      Breath sounds: Wheezing present. No rhonchi.   Abdominal:      General: There is no distension.   Musculoskeletal: Normal range of motion.   Skin:     General: Skin is warm and dry.   Neurological:      General: No focal deficit present.      Mental Status: She is alert and oriented to person, place, and time. Mental status is at baseline.      Gait: Gait (gait at baseline) normal.   Psychiatric:         Judgment: Judgment normal.          DX-CHEST-2 VIEWS   Order: [id number]   Status:  Final result    Visible to patient:  No (not released) Next appt:  None Dx:  Acute respiratory infection   Details     Reading Physician  Reading Date  Result Priority    Goyo Qureshi M.D.  [phone]  6/24/2020  Urgent Care       Narrative & Impression         6/24/2020 3:10 PM     HISTORY/REASON FOR EXAM:  Shortness of Breath.        TECHNIQUE/EXAM DESCRIPTION AND NUMBER OF VIEWS:  Two views of the chest.     COMPARISON:  09/13/2014     FINDINGS:  The heart is normal in size.  No pulmonary infiltrates or consolidations are noted.  No pleural effusions are appreciated.        IMPRESSION:     No active disease.             Last Resulted: 06/24/20  3:11 PM  Order Details View Encounter Lab and Collection                  Assessment/Plan:   1. Bronchitis, allergic, unspecified asthma severity, uncomplicated  - DX-CHEST-2 VIEWS; Future  - benzonatate (TESSALON) 100 MG Cap; Take 1 Cap by mouth 3 times a day as needed for Cough.  Dispense: 30 Cap; Refill: 0    2. Bronchospasm  - albuterol 108 (90 Base) MCG/ACT Aero Soln inhalation aerosol; Inhale 1-2 Puffs by mouth every four hours as needed for Shortness of Breath.  Dispense: 1 Inhaler; Refill: 0      Discussed close monitoring, return precautions, and supportive measures of maintaining adequate fluid hydration and caloric intake, relative rest and symptom management as needed for pain and/or fever.    Differential diagnosis, natural history, supportive care, and indications for immediate follow-up discussed.     Advised the patient to follow-up with the primary care physician for recheck, reevaluation, and consideration of further management.    Please note that this dictation was created using voice recognition software. I have worked with consultants from the vendor as well as technical experts from [employer] to optimize the interface. I have made every reasonable attempt to correct obvious errors, but I expect that there are errors of grammar and possibly content that I  did not discover before finalizing the note.

## 2020-06-24 NOTE — PATIENT INSTRUCTIONS
Bronchospasm, Adult  A bronchospasm is a spasm or tightening of the airways going into the lungs. During a bronchospasm breathing becomes more difficult because the airways get smaller. When this happens there can be coughing, a whistling sound when breathing (wheezing), and difficulty breathing. Bronchospasm is often associated with asthma, but not all patients who experience a bronchospasm have asthma.  What are the causes?  A bronchospasm is caused by inflammation or irritation of the airways. The inflammation or irritation may be triggered by:  · Allergies (such as to animals, pollen, food, or mold). Allergens that cause bronchospasm may cause wheezing immediately after exposure or many hours later.  · Infection. Viral infections are believed to be the most common cause of bronchospasm.  · Exercise.  · Irritants (such as pollution, cigarette smoke, strong odors, aerosol sprays, and paint fumes).  · Weather changes. Winds increase molds and pollens in the air. Rain refreshes the air by washing irritants out. Cold air may cause inflammation.  · Stress and emotional upset.  What are the signs or symptoms?  · Wheezing.  · Excessive nighttime coughing.  · Frequent or severe coughing with a simple cold.  · Chest tightness.  · Shortness of breath.  How is this diagnosed?  Bronchospasm is usually diagnosed through a history and physical exam. Tests, such as chest X-rays, are sometimes done to look for other conditions.  How is this treated?  · Inhaled medicines can be given to open up your airways and help you breathe. The medicines can be given using either an inhaler or a nebulizer machine.  · Corticosteroid medicines may be given for severe bronchospasm, usually when it is associated with asthma.  Follow these instructions at home:  · Always have a plan prepared for seeking medical care. Know when to call your health care provider and local emergency services (911 in the [place] Know where you can access local  emergency care.  · Only take medicines as directed by your health care provider.  · If you were prescribed an inhaler or nebulizer machine, ask your health care provider to explain how to use it correctly. Always use a spacer with your inhaler if you were given one.  · It is necessary to remain calm during an attack. Try to relax and breathe more slowly.  · Control your home environment in the following ways:  ¨ Change your heating and air conditioning filter at least once a month.  ¨ Limit your use of fireplaces and wood stoves.  ¨ Do not smoke and do not allow smoking in your home.  ¨ Avoid exposure to perfumes and fragrances.  ¨ Get rid of pests (such as roaches and mice) and their droppings.  ¨ Throw away plants if you see mold on them.  ¨ Keep your house clean and dust free.  ¨ Replace carpet with wood, tile, or vinyl calin. Carpet can trap dander and dust.  ¨ Use allergy-proof pillows, mattress covers, and box spring covers.  ¨ Wash bed sheets and blankets every week in hot water and dry them in a dryer.  ¨ Use blankets that are made of polyester or cotton.  ¨ Wash hands frequently.  Contact a health care provider if:  · You have muscle aches.  · You have chest pain.  · The sputum changes from clear or white to yellow, green, gray, or bloody.  · The sputum you cough up gets thicker.  · There are problems that may be related to the medicine you are given, such as a rash, itching, swelling, or trouble breathing.  Get help right away if:  · You have worsening wheezing and coughing even after taking your prescribed medicines.  · You have increased difficulty breathing.  · You develop severe chest pain.  This information is not intended to replace advice given to you by your health care provider. Make sure you discuss any questions you have with your health care provider.  Document Released: 12/20/2004 Document Revised: 05/25/2017 Document Reviewed: 06/09/2014  [employer] Interactive Patient Education © 2017  [employer]

## 2020-07-04 DIAGNOSIS — N83.202 LEFT OVARIAN CYST: ICD-10-CM

## 2020-07-06 RX ORDER — IBUPROFEN 800 MG/1
TABLET ORAL
Qty: 90 TAB | Refills: 0 | Status: SHIPPED | OUTPATIENT
Start: 2020-07-06 | End: 2020-08-02

## 2020-08-02 ENCOUNTER — APPOINTMENT (OUTPATIENT)
Dept: RADIOLOGY | Facility: MEDICAL CENTER | Age: 25
End: 2020-08-02
Attending: EMERGENCY MEDICINE
Payer: COMMERCIAL

## 2020-08-02 ENCOUNTER — HOSPITAL ENCOUNTER (EMERGENCY)
Facility: MEDICAL CENTER | Age: 25
End: 2020-08-02
Attending: EMERGENCY MEDICINE
Payer: COMMERCIAL

## 2020-08-02 VITALS
DIASTOLIC BLOOD PRESSURE: 68 MMHG | HEIGHT: 60 IN | SYSTOLIC BLOOD PRESSURE: 102 MMHG | OXYGEN SATURATION: 97 % | RESPIRATION RATE: 20 BRPM | HEART RATE: 77 BPM | WEIGHT: 145.5 LBS | TEMPERATURE: 97.8 F | BODY MASS INDEX: 28.57 KG/M2

## 2020-08-02 DIAGNOSIS — E86.0 MILD DEHYDRATION: ICD-10-CM

## 2020-08-02 DIAGNOSIS — R10.2 ACUTE PELVIC PAIN, FEMALE: ICD-10-CM

## 2020-08-02 LAB
ALBUMIN SERPL BCP-MCNC: 4.1 G/DL (ref 3.2–4.9)
ALBUMIN/GLOB SERPL: 1.5 G/DL
ALP SERPL-CCNC: 48 U/L (ref 30–99)
ALT SERPL-CCNC: 57 U/L (ref 2–50)
ANION GAP SERPL CALC-SCNC: 10 MMOL/L (ref 7–16)
APPEARANCE UR: CLEAR
AST SERPL-CCNC: 58 U/L (ref 12–45)
BASOPHILS # BLD AUTO: 0.6 % (ref 0–1.8)
BASOPHILS # BLD: 0.04 K/UL (ref 0–0.12)
BILIRUB SERPL-MCNC: 0.5 MG/DL (ref 0.1–1.5)
BILIRUB UR QL STRIP.AUTO: NEGATIVE
BUN SERPL-MCNC: 11 MG/DL (ref 8–22)
CALCIUM SERPL-MCNC: 8.9 MG/DL (ref 8.4–10.2)
CHLORIDE SERPL-SCNC: 103 MMOL/L (ref 96–112)
CO2 SERPL-SCNC: 23 MMOL/L (ref 20–33)
COLOR UR: YELLOW
CREAT SERPL-MCNC: 0.55 MG/DL (ref 0.5–1.4)
EOSINOPHIL # BLD AUTO: 0.15 K/UL (ref 0–0.51)
EOSINOPHIL NFR BLD: 2.4 % (ref 0–6.9)
ERYTHROCYTE [DISTWIDTH] IN BLOOD BY AUTOMATED COUNT: 41.6 FL (ref 35.9–50)
GLOBULIN SER CALC-MCNC: 2.8 G/DL (ref 1.9–3.5)
GLUCOSE SERPL-MCNC: 105 MG/DL (ref 65–99)
GLUCOSE UR STRIP.AUTO-MCNC: NEGATIVE MG/DL
HCG UR QL: NEGATIVE
HCT VFR BLD AUTO: 41.7 % (ref 37–47)
HGB BLD-MCNC: 14.1 G/DL (ref 12–16)
IMM GRANULOCYTES # BLD AUTO: 0.01 K/UL (ref 0–0.11)
IMM GRANULOCYTES NFR BLD AUTO: 0.2 % (ref 0–0.9)
KETONES UR STRIP.AUTO-MCNC: 15 MG/DL
LEUKOCYTE ESTERASE UR QL STRIP.AUTO: NEGATIVE
LYMPHOCYTES # BLD AUTO: 2.27 K/UL (ref 1–4.8)
LYMPHOCYTES NFR BLD: 36 % (ref 22–41)
MCH RBC QN AUTO: 30.9 PG (ref 27–33)
MCHC RBC AUTO-ENTMCNC: 33.8 G/DL (ref 33.6–35)
MCV RBC AUTO: 91.4 FL (ref 81.4–97.8)
MICRO URNS: ABNORMAL
MONOCYTES # BLD AUTO: 0.49 K/UL (ref 0–0.85)
MONOCYTES NFR BLD AUTO: 7.8 % (ref 0–13.4)
NEUTROPHILS # BLD AUTO: 3.35 K/UL (ref 2–7.15)
NEUTROPHILS NFR BLD: 53 % (ref 44–72)
NITRITE UR QL STRIP.AUTO: NEGATIVE
NRBC # BLD AUTO: 0 K/UL
NRBC BLD-RTO: 0 /100 WBC
PH UR STRIP.AUTO: 6.5 [PH] (ref 5–8)
PLATELET # BLD AUTO: 353 K/UL (ref 164–446)
PMV BLD AUTO: 8.3 FL (ref 9–12.9)
POTASSIUM SERPL-SCNC: 3.8 MMOL/L (ref 3.6–5.5)
PROT SERPL-MCNC: 6.9 G/DL (ref 6–8.2)
PROT UR QL STRIP: NEGATIVE MG/DL
RBC # BLD AUTO: 4.56 M/UL (ref 4.2–5.4)
RBC UR QL AUTO: NEGATIVE
SODIUM SERPL-SCNC: 136 MMOL/L (ref 135–145)
SP GR UR STRIP.AUTO: 1.02
WBC # BLD AUTO: 6.3 K/UL (ref 4.8–10.8)

## 2020-08-02 PROCEDURE — 81003 URINALYSIS AUTO W/O SCOPE: CPT

## 2020-08-02 PROCEDURE — 80053 COMPREHEN METABOLIC PANEL: CPT

## 2020-08-02 PROCEDURE — 96374 THER/PROPH/DIAG INJ IV PUSH: CPT

## 2020-08-02 PROCEDURE — 700111 HCHG RX REV CODE 636 W/ 250 OVERRIDE (IP): Performed by: EMERGENCY MEDICINE

## 2020-08-02 PROCEDURE — 76856 US EXAM PELVIC COMPLETE: CPT

## 2020-08-02 PROCEDURE — 81025 URINE PREGNANCY TEST: CPT

## 2020-08-02 PROCEDURE — 99284 EMERGENCY DEPT VISIT MOD MDM: CPT

## 2020-08-02 PROCEDURE — 85025 COMPLETE CBC W/AUTO DIFF WBC: CPT

## 2020-08-02 RX ORDER — KETOROLAC TROMETHAMINE 10 MG/1
10 TABLET, FILM COATED ORAL EVERY 4 HOURS PRN
Qty: 30 TAB | Refills: 0 | Status: SHIPPED | OUTPATIENT
Start: 2020-08-02 | End: 2020-08-07

## 2020-08-02 RX ORDER — ONDANSETRON 4 MG/1
4 TABLET, ORALLY DISINTEGRATING ORAL EVERY 6 HOURS PRN
Qty: 10 TAB | Refills: 0 | Status: SHIPPED | OUTPATIENT
Start: 2020-08-02 | End: 2021-04-21

## 2020-08-02 RX ORDER — KETOROLAC TROMETHAMINE 30 MG/ML
15 INJECTION, SOLUTION INTRAMUSCULAR; INTRAVENOUS ONCE
Status: COMPLETED | OUTPATIENT
Start: 2020-08-02 | End: 2020-08-02

## 2020-08-02 RX ADMIN — KETOROLAC TROMETHAMINE 15 MG: 30 INJECTION, SOLUTION INTRAMUSCULAR at 18:42

## 2020-08-02 ASSESSMENT — FIBROSIS 4 INDEX: FIB4 SCORE: 0.3

## 2020-08-03 NOTE — ED TRIAGE NOTES
Presents complaining of left ovarian cyst pain recurring for approximately a week.  She admits to prior similar symptomatology.  Pt denies any domestic high risk areas, or international travel within the past 14 days.    Chief Complaint   Patient presents with   • Ovarian Cyst     /81   Pulse 90   Temp 36.6 °C (97.8 °F) (Temporal)   Resp 20   Ht 1.524 m (5')   Wt 66 kg (145 lb 8.1 oz)   LMP 07/08/2020 (Approximate)   SpO2 94%   BMI 28.42 kg/m²

## 2020-08-03 NOTE — ED NOTES
Written and verbal discharge instructions given to patient. Patient acknowledges and reports understanding of instructions.  Patient is agreeable to discharge at this time.

## 2020-08-03 NOTE — ED PROVIDER NOTES
"ED Provider Note    ED Provider Note    Scribed for Tera Fonseca MD by Tera Fonseca M.D.. 8/2/2020, 6:15 PM.    Primary care provider: ROSAURA Driver  Means of arrival: Private  History obtained from: Patient  History limited by: None    CHIEF COMPLAINT  Chief Complaint   Patient presents with   • Ovarian Cyst       HPI  Sulema Bull is a 24 y.o. female who presents to the Emergency Department for evaluation of left-sided pelvic discomfort.  Patient relates this was initially on and off about 1 week ago and it now is becoming a constant and more severe sharp discomfort.  Pain is localized to the left adnexa with radiation to the left low back on the left lateral proximal thigh.  No right sided symptoms.  Patient notes this is identical in character to previous diagnosis of ovarian cyst in May of this year.  Patient relates nausea and occasional \"dry heaves\".  She also relates urinary urgency without dysuria or hematuria.  Last menstrual period approximately 3 and half weeks ago.  She does not feel she be pregnant, she denies any vaginal discharge.  No clear alleviating or exacerbating factors, patient relates she is taken over-the-counter medications for this today.  She does have an appointment on Friday to be evaluated by her established OB/GYN at [place].    REVIEW OF SYSTEMS  Pertinent positives include left-sided adnexal pain. Pertinent negatives include no vaginal discharge, no vaginal bleeding, does not feel she could be pregnant, no fever.  All other systems reviewed and negative.    PAST MEDICAL HISTORY   has a past medical history of Depression and Pain (12/20/2019).    SURGICAL HISTORY   has a past surgical history that includes colonoscopy and marilin by laparoscopy (N/A, 12/23/2019).    SOCIAL HISTORY  Social History     Tobacco Use   • Smoking status: Never Smoker   • Smokeless tobacco: Never Used   Substance Use Topics   • Alcohol use: Not Currently     " Alcohol/week: 0.0 oz   • Drug use: Yes     Types: Marijuana, Inhaled, Oral     Comment: Marijuana      Social History     Substance and Sexual Activity   Drug Use Yes   • Types: Marijuana, Inhaled, Oral    Comment: Marijuana       FAMILY HISTORY  Family History   Problem Relation Age of Onset   • Alcohol/Drug Father    • Alcohol/Drug Brother        CURRENT MEDICATIONS  Home Medications    **Home medications have not yet been reviewed for this encounter**         ALLERGIES  No Known Allergies    PHYSICAL EXAM  VITAL SIGNS: /68   Pulse 77   Temp 36.6 °C (97.8 °F) (Temporal)   Resp 20   Ht 1.524 m (5')   Wt 66 kg (145 lb 8.1 oz)   LMP 07/08/2020 (Approximate)   SpO2 97%   BMI 28.42 kg/m²     General: Alert, no acute distress, appears moderately uncomfortable.  Skin: Warm, dry, normal for ethnicity  Head: Normocephalic, atraumatic  Neck: Trachea midline, no tenderness  Eye: PERRL, normal conjunctiva, sclera are anicteric  Cardiovascular: Regular rate and rhythm, No murmur, Normal peripheral perfusion  Respiratory: Lungs CTA, respirations are non-labored, breath sounds are equal  Gastrointestinal: Soft, nontender, non distended.  No CVA tenderness.  Patient has left adnexal tenderness, no right lower quadrant tenderness, no guarding, no rebound, no rigidity.  Bowel sounds are normoactive.  Musculoskeletal: No swelling, no deformity  Neurological: Alert and oriented to person, place, time, and situation  Psychiatric: Cooperative, appropriate mood & affect      DIAGNOSTIC STUDIES/PROCEDURES    LABS  Results for orders placed or performed during the hospital encounter of 08/02/20   CBC WITH DIFFERENTIAL   Result Value Ref Range    WBC 6.3 4.8 - 10.8 K/uL    RBC 4.56 4.20 - 5.40 M/uL    Hemoglobin 14.1 12.0 - 16.0 g/dL    Hematocrit 41.7 37.0 - 47.0 %    MCV 91.4 81.4 - 97.8 fL    MCH 30.9 27.0 - 33.0 pg    MCHC 33.8 33.6 - 35.0 g/dL    RDW 41.6 35.9 - 50.0 fL    Platelet Count 353 164 - 446 K/uL    MPV 8.3 (L)  9.0 - 12.9 fL    Neutrophils-Polys 53.00 44.00 - 72.00 %    Lymphocytes 36.00 22.00 - 41.00 %    Monocytes 7.80 0.00 - 13.40 %    Eosinophils 2.40 0.00 - 6.90 %    Basophils 0.60 0.00 - 1.80 %    Immature Granulocytes 0.20 0.00 - 0.90 %    Nucleated RBC 0.00 /100 WBC    Neutrophils (Absolute) 3.35 2.00 - 7.15 K/uL    Lymphs (Absolute) 2.27 1.00 - 4.80 K/uL    Monos (Absolute) 0.49 0.00 - 0.85 K/uL    Eos (Absolute) 0.15 0.00 - 0.51 K/uL    Baso (Absolute) 0.04 0.00 - 0.12 K/uL    Immature Granulocytes (abs) 0.01 0.00 - 0.11 K/uL    NRBC (Absolute) 0.00 K/uL   COMP METABOLIC PANEL   Result Value Ref Range    Sodium 136 135 - 145 mmol/L    Potassium 3.8 3.6 - 5.5 mmol/L    Chloride 103 96 - 112 mmol/L    Co2 23 20 - 33 mmol/L    Anion Gap 10.0 7.0 - 16.0    Glucose 105 (H) 65 - 99 mg/dL    Bun 11 8 - 22 mg/dL    Creatinine 0.55 0.50 - 1.40 mg/dL    Calcium 8.9 8.4 - 10.2 mg/dL    AST(SGOT) 58 (H) 12 - 45 U/L    ALT(SGPT) 57 (H) 2 - 50 U/L    Alkaline Phosphatase 48 30 - 99 U/L    Total Bilirubin 0.5 0.1 - 1.5 mg/dL    Albumin 4.1 3.2 - 4.9 g/dL    Total Protein 6.9 6.0 - 8.2 g/dL    Globulin 2.8 1.9 - 3.5 g/dL    A-G Ratio 1.5 g/dL   URINALYSIS CULTURE, IF INDICATED    Specimen: Urine, Clean Catch; Blood   Result Value Ref Range    Color Yellow     Character Clear     Specific Gravity 1.020 <1.035    Ph 6.5 5.0 - 8.0    Glucose Negative Negative mg/dL    Ketones 15 (A) Negative mg/dL    Protein Negative Negative mg/dL    Bilirubin Negative Negative    Nitrite Negative Negative    Leukocyte Esterase Negative Negative    Occult Blood Negative Negative    Micro Urine Req see below    HCG QUALITATIVE UR (Lab)   Result Value Ref Range    Beta-Hcg Urine Negative Negative   ESTIMATED GFR   Result Value Ref Range    GFR If African American >60 >60 mL/min/1.73 m 2    GFR If Non African American >60 >60 mL/min/1.73 m 2     All labs reviewed by me.      RADIOLOGY  US-PELVIC COMPLETE (TRANSABDOMINAL/TRANSVAGINAL) (COMBO)   Final  Result      1.  Normal transvaginal appearance of the pelvis.      2.  No residual left adnexal cyst identified.        The radiologist's interpretation of all radiological studies have been reviewed by me.    COURSE & MEDICAL DECISION MAKING  Pertinent Labs & Imaging studies reviewed. (See chart for details)    6:15 PM - Patient seen and examined at bedside. Patient will be treated with ketorolac 15 mg IV. Ordered pelvic ultrasound to metabolic work-up and urinalysis to evaluate her symptoms. The differential diagnoses include but are not limited to: Ovarian cyst, ruptured ovarian cyst, ovarian torsion, UTI    Patient Vitals for the past 24 hrs:   BP Temp Temp src Pulse Resp SpO2 Height Weight   08/02/20 1955 102/68 -- -- -- -- -- -- --   08/02/20 1953 -- -- -- 77 -- 97 % -- --   08/02/20 1840 (!) 97/53 -- -- 75 -- 95 % -- --   08/02/20 1751 125/81 36.6 °C (97.8 °F) Temporal 90 20 94 % 1.524 m (5') 66 kg (145 lb 8.1 oz)         Decision Making:  This is a 24 y.o. year old female who presents with left adnexal pain, history of previous ovarian cyst.  Patient notes symptoms are identical.  She has no surgical abdominal signs, she has no right lower quadrant tenderness, no fever, this would not be consistent with acute surgical abdomen.  Ultrasound is thankfully unremarkable, no evidence of torsion.  Unclear etiology of her pain, given otherwise unremarkable work-up, I suspect perhaps patient had an ovarian cyst that could have already ruptured.  There is no significant free fluid in the pelvis and given no peritoneal signs otherwise unremarkable evaluation there is no indication for emergent gynecological surgical intervention.     The patient will return for new or worsening symptoms and is stable at the time of discharge.      DISPOSITION:  Patient will be discharged home in stable condition.    FOLLOW UP:  Sarah Munoz, ROSHANP.R.N.  [address] [facility]  [address]  [facility] [id number] [phone]    Schedule an  appointment as soon as possible for a visit in 2 days        OUTPATIENT MEDICATIONS:  Discharge Medication List as of 8/2/2020  7:56 PM      START taking these medications    Details   ondansetron (ZOFRAN ODT) 4 MG TABLET DISPERSIBLE Take 1 Tab by mouth every 6 hours as needed., Disp-10 Tab,R-0, Print Rx Paper      ketorolac (TORADOL) 10 MG Tab Take 1 Tab by mouth every four hours as needed for Moderate Pain for up to 5 days., Disp-30 Tab,R-0, Print Rx Paper               FINAL IMPRESSION  1. Acute pelvic pain, female    2. Mild dehydration          Tera BARRETO M.D. (Scribe), am scribing for, and in the presence of, Tera Fonseca MD.    Electronically signed by: Tera Fonseca M.D. (Scribe), 8/2/2020    Tera BARRETO MD personally performed the services described in this documentation, as scribed by Tera Fonseca M.D. in my presence, and it is both accurate and complete    The note accurately reflects work and decisions made by me.  Tera Fonseca M.D.  8/2/2020  11:34 PM

## 2020-08-19 DIAGNOSIS — F51.04 PSYCHOPHYSIOLOGIC INSOMNIA: ICD-10-CM

## 2020-08-19 RX ORDER — TRAZODONE HYDROCHLORIDE 50 MG/1
50 TABLET ORAL NIGHTLY PRN
Qty: 30 TAB | Refills: 3 | Status: SHIPPED | OUTPATIENT
Start: 2020-08-19 | End: 2021-01-07 | Stop reason: SDUPTHER

## 2021-01-07 DIAGNOSIS — F51.04 PSYCHOPHYSIOLOGIC INSOMNIA: ICD-10-CM

## 2021-01-07 RX ORDER — TRAZODONE HYDROCHLORIDE 50 MG/1
50 TABLET ORAL NIGHTLY PRN
Qty: 30 TAB | Refills: 3 | Status: SHIPPED | OUTPATIENT
Start: 2021-01-07 | End: 2021-05-11

## 2021-01-14 DIAGNOSIS — F32.A ANXIETY AND DEPRESSION: ICD-10-CM

## 2021-01-14 DIAGNOSIS — F41.9 ANXIETY AND DEPRESSION: ICD-10-CM

## 2021-01-14 RX ORDER — ESCITALOPRAM OXALATE 20 MG/1
20 TABLET ORAL
Qty: 90 TAB | Refills: 3 | Status: SHIPPED | OUTPATIENT
Start: 2021-01-14 | End: 2023-04-12

## 2021-03-02 NOTE — TELEPHONE ENCOUNTER
Received request via: Pharmacy    Was the patient seen in the last year in this department? Yes    Does the patient have an active prescription (recently filled or refills available) for medication(s) requested? No     Pharmacy sent refill request for:   Lessina tablets 28, 1/daily PO  Not seen in chart. Jackieva is seen her chart though.     LOV: 06/02/2020 with Dx of Left Ovarian Cyst.

## 2021-03-03 RX ORDER — LEVONORGESTREL AND ETHINYL ESTRADIOL 0.1-0.02MG
1 KIT ORAL
Qty: 84 TABLET | Refills: 1 | Status: SHIPPED
Start: 2021-03-03 | End: 2022-01-23

## 2021-03-21 ENCOUNTER — HOSPITAL ENCOUNTER (EMERGENCY)
Facility: MEDICAL CENTER | Age: 26
End: 2021-03-21
Attending: EMERGENCY MEDICINE
Payer: COMMERCIAL

## 2021-03-21 ENCOUNTER — APPOINTMENT (OUTPATIENT)
Dept: RADIOLOGY | Facility: MEDICAL CENTER | Age: 26
End: 2021-03-21
Attending: EMERGENCY MEDICINE
Payer: COMMERCIAL

## 2021-03-21 VITALS
WEIGHT: 154.32 LBS | RESPIRATION RATE: 16 BRPM | TEMPERATURE: 98 F | DIASTOLIC BLOOD PRESSURE: 69 MMHG | SYSTOLIC BLOOD PRESSURE: 107 MMHG | BODY MASS INDEX: 30.3 KG/M2 | HEIGHT: 60 IN | OXYGEN SATURATION: 96 % | HEART RATE: 81 BPM

## 2021-03-21 DIAGNOSIS — R10.32 LEFT LOWER QUADRANT ABDOMINAL PAIN: ICD-10-CM

## 2021-03-21 LAB
ALBUMIN SERPL BCP-MCNC: 4.2 G/DL (ref 3.2–4.9)
ALBUMIN/GLOB SERPL: 1.6 G/DL
ALP SERPL-CCNC: 38 U/L (ref 30–99)
ALT SERPL-CCNC: 11 U/L (ref 2–50)
ANION GAP SERPL CALC-SCNC: 7 MMOL/L (ref 7–16)
APPEARANCE UR: CLEAR
AST SERPL-CCNC: 14 U/L (ref 12–45)
BACTERIA #/AREA URNS HPF: ABNORMAL /HPF
BASOPHILS # BLD AUTO: 0.9 % (ref 0–1.8)
BASOPHILS # BLD: 0.05 K/UL (ref 0–0.12)
BILIRUB SERPL-MCNC: 0.3 MG/DL (ref 0.1–1.5)
BILIRUB UR QL STRIP.AUTO: NEGATIVE
BUN SERPL-MCNC: 14 MG/DL (ref 8–22)
CALCIUM SERPL-MCNC: 8.8 MG/DL (ref 8.4–10.2)
CHLORIDE SERPL-SCNC: 105 MMOL/L (ref 96–112)
CO2 SERPL-SCNC: 25 MMOL/L (ref 20–33)
COLOR UR: YELLOW
CREAT SERPL-MCNC: 0.52 MG/DL (ref 0.5–1.4)
EOSINOPHIL # BLD AUTO: 0.09 K/UL (ref 0–0.51)
EOSINOPHIL NFR BLD: 1.6 % (ref 0–6.9)
EPI CELLS #/AREA URNS HPF: ABNORMAL /HPF
ERYTHROCYTE [DISTWIDTH] IN BLOOD BY AUTOMATED COUNT: 41 FL (ref 35.9–50)
GLOBULIN SER CALC-MCNC: 2.7 G/DL (ref 1.9–3.5)
GLUCOSE SERPL-MCNC: 93 MG/DL (ref 65–99)
GLUCOSE UR STRIP.AUTO-MCNC: NEGATIVE MG/DL
HCG SERPL QL: NEGATIVE
HCT VFR BLD AUTO: 41 % (ref 37–47)
HGB BLD-MCNC: 13.6 G/DL (ref 12–16)
HYALINE CASTS #/AREA URNS LPF: ABNORMAL /LPF
IMM GRANULOCYTES # BLD AUTO: 0.01 K/UL (ref 0–0.11)
IMM GRANULOCYTES NFR BLD AUTO: 0.2 % (ref 0–0.9)
KETONES UR STRIP.AUTO-MCNC: NEGATIVE MG/DL
LEUKOCYTE ESTERASE UR QL STRIP.AUTO: NEGATIVE
LYMPHOCYTES # BLD AUTO: 1.82 K/UL (ref 1–4.8)
LYMPHOCYTES NFR BLD: 32.2 % (ref 22–41)
MCH RBC QN AUTO: 30.6 PG (ref 27–33)
MCHC RBC AUTO-ENTMCNC: 33.2 G/DL (ref 33.6–35)
MCV RBC AUTO: 92.3 FL (ref 81.4–97.8)
MICRO URNS: ABNORMAL
MONOCYTES # BLD AUTO: 0.46 K/UL (ref 0–0.85)
MONOCYTES NFR BLD AUTO: 8.1 % (ref 0–13.4)
MUCOUS THREADS #/AREA URNS HPF: ABNORMAL /HPF
NEUTROPHILS # BLD AUTO: 3.22 K/UL (ref 2–7.15)
NEUTROPHILS NFR BLD: 57 % (ref 44–72)
NITRITE UR QL STRIP.AUTO: NEGATIVE
NRBC # BLD AUTO: 0 K/UL
NRBC BLD-RTO: 0 /100 WBC
PH UR STRIP.AUTO: 5.5 [PH] (ref 5–8)
PLATELET # BLD AUTO: 301 K/UL (ref 164–446)
PMV BLD AUTO: 8.2 FL (ref 9–12.9)
POTASSIUM SERPL-SCNC: 4 MMOL/L (ref 3.6–5.5)
PROT SERPL-MCNC: 6.9 G/DL (ref 6–8.2)
PROT UR QL STRIP: NEGATIVE MG/DL
RBC # BLD AUTO: 4.44 M/UL (ref 4.2–5.4)
RBC # URNS HPF: ABNORMAL /HPF
RBC UR QL AUTO: ABNORMAL
SODIUM SERPL-SCNC: 137 MMOL/L (ref 135–145)
SP GR UR STRIP.AUTO: >=1.03
WBC # BLD AUTO: 5.7 K/UL (ref 4.8–10.8)
WBC #/AREA URNS HPF: ABNORMAL /HPF

## 2021-03-21 PROCEDURE — A9270 NON-COVERED ITEM OR SERVICE: HCPCS | Performed by: EMERGENCY MEDICINE

## 2021-03-21 PROCEDURE — 85025 COMPLETE CBC W/AUTO DIFF WBC: CPT

## 2021-03-21 PROCEDURE — 96374 THER/PROPH/DIAG INJ IV PUSH: CPT

## 2021-03-21 PROCEDURE — 74177 CT ABD & PELVIS W/CONTRAST: CPT

## 2021-03-21 PROCEDURE — 84703 CHORIONIC GONADOTROPIN ASSAY: CPT

## 2021-03-21 PROCEDURE — 81001 URINALYSIS AUTO W/SCOPE: CPT

## 2021-03-21 PROCEDURE — 700117 HCHG RX CONTRAST REV CODE 255: Performed by: EMERGENCY MEDICINE

## 2021-03-21 PROCEDURE — 700111 HCHG RX REV CODE 636 W/ 250 OVERRIDE (IP): Performed by: EMERGENCY MEDICINE

## 2021-03-21 PROCEDURE — 99284 EMERGENCY DEPT VISIT MOD MDM: CPT

## 2021-03-21 PROCEDURE — 700102 HCHG RX REV CODE 250 W/ 637 OVERRIDE(OP): Performed by: EMERGENCY MEDICINE

## 2021-03-21 PROCEDURE — 87086 URINE CULTURE/COLONY COUNT: CPT

## 2021-03-21 PROCEDURE — 80053 COMPREHEN METABOLIC PANEL: CPT

## 2021-03-21 PROCEDURE — 36415 COLL VENOUS BLD VENIPUNCTURE: CPT

## 2021-03-21 RX ORDER — OXYCODONE HYDROCHLORIDE AND ACETAMINOPHEN 5; 325 MG/1; MG/1
1 TABLET ORAL ONCE
Status: COMPLETED | OUTPATIENT
Start: 2021-03-21 | End: 2021-03-21

## 2021-03-21 RX ORDER — NAPROXEN SODIUM 220 MG
220 TABLET ORAL PRN
Status: SHIPPED | COMMUNITY
End: 2022-01-23

## 2021-03-21 RX ORDER — KETOROLAC TROMETHAMINE 30 MG/ML
15 INJECTION, SOLUTION INTRAMUSCULAR; INTRAVENOUS ONCE
Status: COMPLETED | OUTPATIENT
Start: 2021-03-21 | End: 2021-03-21

## 2021-03-21 RX ADMIN — KETOROLAC TROMETHAMINE 15 MG: 30 INJECTION, SOLUTION INTRAMUSCULAR at 13:57

## 2021-03-21 RX ADMIN — IOHEXOL 100 ML: 350 INJECTION, SOLUTION INTRAVENOUS at 13:16

## 2021-03-21 RX ADMIN — OXYCODONE HYDROCHLORIDE AND ACETAMINOPHEN 1 TABLET: 5; 325 TABLET ORAL at 14:01

## 2021-03-21 ASSESSMENT — FIBROSIS 4 INDEX: FIB4 SCORE: 0.57

## 2021-03-21 NOTE — ED PROVIDER NOTES
ED Provider Note    Chief Complaint:   Pelvic pain    HPI:  Sluema Bull is a very pleasant 25-year-old woman who presents for evaluation of left-sided pelvic pain.  She has had ongoing left-sided pelvic pain for the past few months, she has been seen by 2 different gynecologist.  She is most recently seen for this pain 2 weeks ago, she had an ultrasound performed and a pelvic examination done at that time, she is currently followed by Dr. Baldwin.  She reports there were some abnormalities on the ultrasound, and that she has an appointment with her gynecologist tomorrow to discuss diagnostic laparoscopy.  However, she has had some worsening pain over the past 2 to 3 days.  She describes her pain as a cramping sensation localized to the left lower pelvis, with some radiation towards the thigh and some radiation towards the back.  It is exacerbated by pressing on the affected area and movement.  She also had some abnormal vaginal bleeding over the past 2 days, she is currently on oral contraceptive tablets and states that she does not typically have breakthrough bleeding, so this is unusual for her at this time.  Today she had some episodes of nausea and vomiting, which is also unusual for her.  She is not having significant dysuria, no increased frequency of urination but states that her pain does at times seem to be related to urination.    Review of Systems:  See HPI for pertinent positives and negatives. All other systems negative.    Past Medical History:   has a past medical history of Depression and Pain (12/20/2019).    Social History:  Social History     Tobacco Use   • Smoking status: Never Smoker   • Smokeless tobacco: Never Used   Substance and Sexual Activity   • Alcohol use: Not Currently     Alcohol/week: 0.0 oz   • Drug use: Yes     Types: Marijuana, Inhaled, Oral     Comment: Marijuana   • Sexual activity: Never     Partners: Male     Birth control/protection: Pill       Surgical History:   has  a past surgical history that includes colonoscopy and marilin by laparoscopy (N/A, 12/23/2019).    Current Medications:  Home Medications     Reviewed by Nicole Haywood (Pharmacy Tech) on 03/21/21 at 1044  Med List Status: Complete   Medication Last Dose Status   albuterol 108 (90 Base) MCG/ACT Aero Soln inhalation aerosol Not Taking Active   benzonatate (TESSALON) 100 MG Cap Not Taking Active   escitalopram (LEXAPRO) 20 MG tablet 3/20/2021 Active   levonorgestrel-ethinyl estradiol (VIENVA) 0.1-20 MG-MCG per tablet 3/20/2021 Active   Multiple Vitamins-Minerals (ADULT ONE DAILY GUMMIES PO) > 2 days Active   naproxen (ALEVE) 220 MG tablet > 2 days Active   ondansetron (ZOFRAN ODT) 4 MG TABLET DISPERSIBLE Not Taking Active   traZODone (DESYREL) 50 MG Tab >2 weeks Active                Allergies:  No Known Allergies    Physical Exam:  Vital Signs: /59   Pulse 77   Temp 36.7 °C (98 °F) (Temporal)   Resp 16   Ht 1.524 m (5')   Wt 70 kg (154 lb 5.2 oz)   LMP 03/09/2021 (Exact Date)   SpO2 98%   BMI 30.14 kg/m²   Constitutional: Alert, no acute distress  HENT: Normocephalic, mask in place  Eyes: Pupils equal and reactive, normal conjunctiva  Neck: Supple, normal range of motion, no stridor  Cardiovascular: Extremities are warm and well perfused, no murmur appreciated, normal cardiac auscultation  Pulmonary: No respiratory distress, normal work of breathing, no accessory muscule usage, breath sounds clear and equal bilaterally  Abdomen: Soft, no rebound or guarding, she does have some mild to moderate tenderness to palpation of the left lower quadrant, no right lower quadrant tenderness to palpation, no epigastric tenderness to palpation, no right upper quadrant tenderness to palpation  Skin: Warm, dry, no rashes or lesions  Musculoskeletal: Normal range of motion in all extremities, no swelling or deformity noted  Neurologic: Alert, oriented, normal speech, normal motor function  Psychiatric: Normal and  appropriate mood and affect    Medical records reviewed for continuity of care.  Ms. Mahin Bull was seen in this emergency department 8/2/2020 for evaluation of left-sided pelvic discomfort.  States her pain was similar to when she was previously diagnosed with an ovarian cyst a few months prior.  She was treated with Toradol, pelvic ultrasound demonstrated no residual left adnexal cyst.  No evidence of ovarian torsion identified.  She was discharged home with GYN follow-up.    Labs:  Labs Reviewed   CBC WITH DIFFERENTIAL - Abnormal; Notable for the following components:       Result Value    MCHC 33.2 (*)     MPV 8.2 (*)     All other components within normal limits    Narrative:     Release to patient->Immediate   URINALYSIS - Abnormal; Notable for the following components:    Occult Blood Small (*)     All other components within normal limits    Narrative:     Release to patient->Immediate   URINE MICROSCOPIC (W/UA) - Abnormal; Notable for the following components:    RBC 2-5 (*)     Bacteria Few (*)     Epithelial Cells Moderate (*)     All other components within normal limits    Narrative:     Release to patient->Immediate   COMP METABOLIC PANEL    Narrative:     Release to patient->Immediate   HCG QUAL SERUM    Narrative:     Release to patient->Immediate   ESTIMATED GFR    Narrative:     Release to patient->Immediate       Radiology:  CT-ABDOMEN-PELVIS WITH   Final Result      1.  Residual simple appearing cyst in the left ovary currently measuring 3.2 cm. No evidence of torsion on CT.      2.  No left lower quadrant inflammatory change. Scattered sigmoid diverticula are present.      3.  No acute inflammatory or obstructive process identified.      4.  Cholecystectomy.           ED Medications Administered:  Medications   iohexol (OMNIPAQUE) 350 mg/mL (100 mL Intravenous Given 3/21/21 1316)   ketorolac (TORADOL) injection 15 mg (15 mg Intravenous Given 3/21/21 8297)   oxyCODONE-acetaminophen (PERCOCET)  5-325 MG per tablet 1 tablet (1 tablet Oral Given 3/21/21 1401)       Differential diagnosis:  Ovarian cyst, endometriosis, chronic pelvic pain, less likely tubo-ovarian abscess or ovarian torsion    MDM:  Ms. Mahin Bull presents today for evaluation of left-sided lower abdominal and pelvic pain.  This has been an ongoing concern for her, she is currently in the midst of a thorough outpatient work-up, and actually has an appointment with her gynecologist tomorrow.  However her pain became worse today prompting her to come to the emergency department.  She was given a dose of oxycodone after confirming a ride home, and she was also given Toradol for pain.  She did have a recent vaginal exam 2 weeks ago for similar symptoms, this was done by her gynecologist and was unremarkable.  Additionally, she had a pelvic ultrasound done 2 weeks ago with no clear etiology seen.  On arrival to the emergency department today she has no hypotension, no tachycardia, no fevers.  This is less concerning for severe infectious etiology.  She does not have any abnormal vaginal discharge to suggest pelvic inflammatory disease.      On laboratory evaluation CMP is entirely within normal limits, liver enzymes and bilirubin are within normal limits, no electrolyte abnormalities.  Urinalysis is positive for few bacteria and moderate epithelial cells, due to presence of epithelial cells this is likely contamination.  She is not having classic symptoms of a urinary tract infection, urine culture was sent, that result is pending at this time.  At this time I do not believe she requires empiric antibiotics.    She has had multiple pelvic ultrasounds with no clear etiology found.  For this reason I did elect to obtain a CT in the event that the source of her pain is not gynecologic.  CT of the abdomen pelvis demonstrates a residual simple appearing cyst in the left ovary measuring 3.2 cm.  No evidence of torsion seen on CT.  No acute inflammatory  process identified.    She remained without any worsening symptoms during her stay in the emergency department.  At this time, I do not believe she requires any further emergent diagnostics nor treatment.  Plan at this time is for discharge home in stable condition.  She does have close follow-up with her gynecologist tomorrow to discuss laparoscopy as the next step in trying to determine the cause of her pain. Return precautions were discussed with the patient, and provided in written form with the patient's discharge instructions.     Personal protective equipment including N95 surgical respirator, goggles, and gloves were used during this encounter.       Disposition:  Discharge home in stable condition    Final Impression:  1. Left lower quadrant abdominal pain        Electronically signed by: Jill Erickson MD, 3/21/2021 2:48 PM

## 2021-03-21 NOTE — DISCHARGE INSTRUCTIONS
Please follow up with your gynecologist tomorrow as previously scheduled call your primary care physician at the opening of business hours to let them know you were seen in the emergency department. Return immediately if your pain returns or worsens, if you develop any new symptoms, if you are not able to drink fluids, if you have persistent vomiting, if you develop fevers, or if you have any further concerns. Additionally, please return if your symptoms have not resolved and you are unable to follow up with your primary care physician for recheck.

## 2021-03-21 NOTE — ED NOTES
1110 Assumed pt care Blood drawn Urine collected and sent  1115 Pt felt nauseated and faint Vagal episode post blood draw Pt placed flat and rebounded VSS

## 2021-03-21 NOTE — ED TRIAGE NOTES
Presents complaining of pelvic pain with a recent hx of left ovarian cyst diagnosed by her gynecologist approximately 2 weeks ago.  She also reports experiencing transitory episodes of N/V, and vaginal bleeding outside of her regular menstrual cycle.  Chief Complaint   Patient presents with   • Ovarian Cyst   • Pelvic Pain   • Vaginal Bleeding     /84   Pulse 89   Temp 36.7 °C (98 °F) (Temporal)   Resp 16   Ht 1.524 m (5')   Wt 70 kg (154 lb 5.2 oz)   LMP 03/09/2021 (Exact Date)   SpO2 98%   BMI 30.14 kg/m²

## 2021-03-21 NOTE — ED NOTES
1439 VSS Pain almost gone D/C instn reviewed Return for severe pain, faintness or fever > 102 Otherwise F/U with OB as planned tomorrow NSAIDS for pain prn Advised not to drive post meds rec'd in ER Mom at bedside for ride D/C ambul Stable improved condn

## 2021-03-23 LAB
BACTERIA UR CULT: NORMAL
SIGNIFICANT IND 70042: NORMAL
SITE SITE: NORMAL
SOURCE SOURCE: NORMAL

## 2021-04-21 ENCOUNTER — PRE-ADMISSION TESTING (OUTPATIENT)
Dept: ADMISSIONS | Facility: MEDICAL CENTER | Age: 26
End: 2021-04-21
Attending: SPECIALIST
Payer: COMMERCIAL

## 2021-05-05 NOTE — H&P
REASON FOR SURGERY:  Refractory pelvic pain, severe dysmenorrhea and   dyspareunia.     HISTORY OF PRESENT ILLNESS:  This is a 25-year-old  0 with a negative   urine pregnancy test on 2021, who states that she has exhausted all   conservative measures with regard to her longstanding and refractory pelvic   pain, dysmenorrhea and dyspareunia in addition to abnormal uterine bleeding.    She states that she has exhausted all conservative measures.  She has been on   hormonal therapy, birth control pill and has had no resolution of her pain.    Given the longstanding nature of her pain, she wished to proceed forward with   a diagnostic and possible operative laparoscopy, possible fulguration of   endometriosis, diagnostic hysteroscopy, possible dilation and curettage and   any additional and necessary procedures.  Risks, benefits and alternatives of   all individual portions of the surgery were addressed with the patient in   detail.  She has asked appropriate questions, signed the appropriate consents   and is wishing to proceed forward with surgery as planned.  Of note, she does   understand that her abnormal uterine bleeding, dysmenorrhea, dyspareunia and   pelvic pain may be unimproved or actually worsen with the surgery as described   above, even in light of the possibility of endometriosis requiring additional   medical or surgical management.  Even despite these limitations of surgery,   she is wishing to proceed forward with the surgery as scheduled above.     PAST MEDICAL HISTORY:  Noncontributory.     PAST SURGICAL HISTORY:  Cholecystectomy in 2019.     OBSTETRIC HISTORY:  The patient is nulligravid.     PAST GYNECOLOGIC HISTORY:  The patient reports irregular bleeding with   moderate flow, severe dysmenorrhea, dyspareunia, pelvic pain.  She is on   current birth control pills.  She denies any previous sexually transmitted   disease or pelvic infections.     FAMILY HISTORY:  Noncontributory.      REVIEW OF SYSTEMS:  Otherwise, unremarkable.     SOCIAL HISTORY:  She is , works as a .  She reports rare   use of alcohol.  Occasional daily recreational marijuana usage and tobacco.    She denies any other drug usage.     MEDICATIONS:  Lexapro 20 mg tablets daily, levonorgestrel/ethinyl estradiol   0.15/0.03 mg tablets daily.     ALLERGIES:   No known drug allergies.     PHYSICAL EXAMINATION:  VITAL SIGNS:  She is afebrile, hemodynamically stable.  Current vital signs   can be seen in electronic medical record.  HEART:  Regular rate and rhythm.  CHEST:  Clear to auscultation bilaterally.  ABDOMEN:  Soft, nondistended, nontender.  Bowel sounds are present.  PELVIC:  Shows normal external female genitalia.  Small uterus.  No adnexal   masses were appreciated.  She does have tenderness to palpation in bilateral   adnexal regions, left greater than right.  EXTREMITIES:  Nontender.     LABORATORY DATA:  Urine pregnancy test was negative.     DIAGNOSTIC DATA:  Transvaginal pelvic ultrasound done on 2021   demonstrates a small left ovarian cyst measuring 1.7 cm.  No free fluid or   other adnexal masses were appreciated on transvaginal pelvic ultrasound.     ASSESSMENT AND PLAN:  A 25-year-old  0, negative urine pregnancy test   on 2021, states that she now wished to proceed forward with the surgery   as described above, understand limitations of surgery, scheduled for   2021.        ______________________________  MD BLANCA DE LA TORRE/DANA    DD:  2021 13:03  DT:  2021 13:49    Job#:  [id number]

## 2021-05-06 ENCOUNTER — PRE-ADMISSION TESTING (OUTPATIENT)
Dept: ADMISSIONS | Facility: MEDICAL CENTER | Age: 26
End: 2021-05-06
Attending: SPECIALIST
Payer: COMMERCIAL

## 2021-05-06 DIAGNOSIS — Z01.812 PRE-OPERATIVE LABORATORY EXAMINATION: ICD-10-CM

## 2021-05-06 LAB
ANION GAP SERPL CALC-SCNC: 7 MMOL/L (ref 7–16)
BUN SERPL-MCNC: 15 MG/DL (ref 8–22)
CALCIUM SERPL-MCNC: 8.8 MG/DL (ref 8.5–10.5)
CHLORIDE SERPL-SCNC: 107 MMOL/L (ref 96–112)
CO2 SERPL-SCNC: 24 MMOL/L (ref 20–33)
CREAT SERPL-MCNC: 0.47 MG/DL (ref 0.5–1.4)
ERYTHROCYTE [DISTWIDTH] IN BLOOD BY AUTOMATED COUNT: 44 FL (ref 35.9–50)
GLUCOSE SERPL-MCNC: 96 MG/DL (ref 65–99)
HCT VFR BLD AUTO: 42.7 % (ref 37–47)
HGB BLD-MCNC: 14.2 G/DL (ref 12–16)
MCH RBC QN AUTO: 31.1 PG (ref 27–33)
MCHC RBC AUTO-ENTMCNC: 33.3 G/DL (ref 33.6–35)
MCV RBC AUTO: 93.6 FL (ref 81.4–97.8)
PLATELET # BLD AUTO: 338 K/UL (ref 164–446)
PMV BLD AUTO: 8.7 FL (ref 9–12.9)
POTASSIUM SERPL-SCNC: 4.5 MMOL/L (ref 3.6–5.5)
RBC # BLD AUTO: 4.56 M/UL (ref 4.2–5.4)
SODIUM SERPL-SCNC: 138 MMOL/L (ref 135–145)
WBC # BLD AUTO: 6.3 K/UL (ref 4.8–10.8)

## 2021-05-06 PROCEDURE — 36415 COLL VENOUS BLD VENIPUNCTURE: CPT

## 2021-05-06 PROCEDURE — 80048 BASIC METABOLIC PNL TOTAL CA: CPT

## 2021-05-06 PROCEDURE — 85027 COMPLETE CBC AUTOMATED: CPT

## 2021-05-08 ENCOUNTER — PRE-ADMISSION TESTING (OUTPATIENT)
Dept: ADMISSIONS | Facility: MEDICAL CENTER | Age: 26
End: 2021-05-08
Attending: SPECIALIST
Payer: COMMERCIAL

## 2021-05-08 DIAGNOSIS — Z01.812 PRE-OPERATIVE LABORATORY EXAMINATION: ICD-10-CM

## 2021-05-08 LAB — COVID ORDER STATUS COVID19: NORMAL

## 2021-05-08 PROCEDURE — C9803 HOPD COVID-19 SPEC COLLECT: HCPCS

## 2021-05-08 PROCEDURE — U0005 INFEC AGEN DETEC AMPLI PROBE: HCPCS

## 2021-05-08 PROCEDURE — U0003 INFECTIOUS AGENT DETECTION BY NUCLEIC ACID (DNA OR RNA); SEVERE ACUTE RESPIRATORY SYNDROME CORONAVIRUS 2 (SARS-COV-2) (CORONAVIRUS DISEASE [COVID-19]), AMPLIFIED PROBE TECHNIQUE, MAKING USE OF HIGH THROUGHPUT TECHNOLOGIES AS DESCRIBED BY CMS-2020-01-R: HCPCS

## 2021-05-09 LAB
SARS-COV-2 RNA RESP QL NAA+PROBE: NOTDETECTED
SPECIMEN SOURCE: NORMAL

## 2021-05-11 ENCOUNTER — HOSPITAL ENCOUNTER (OUTPATIENT)
Facility: MEDICAL CENTER | Age: 26
End: 2021-05-11
Attending: SPECIALIST | Admitting: SPECIALIST
Payer: COMMERCIAL

## 2021-05-11 ENCOUNTER — ANESTHESIA (OUTPATIENT)
Dept: SURGERY | Facility: MEDICAL CENTER | Age: 26
End: 2021-05-11
Payer: COMMERCIAL

## 2021-05-11 ENCOUNTER — ANESTHESIA EVENT (OUTPATIENT)
Dept: SURGERY | Facility: MEDICAL CENTER | Age: 26
End: 2021-05-11
Payer: COMMERCIAL

## 2021-05-11 VITALS
HEART RATE: 72 BPM | OXYGEN SATURATION: 100 % | RESPIRATION RATE: 15 BRPM | DIASTOLIC BLOOD PRESSURE: 69 MMHG | HEIGHT: 60 IN | WEIGHT: 153.22 LBS | TEMPERATURE: 99.2 F | BODY MASS INDEX: 30.08 KG/M2 | SYSTOLIC BLOOD PRESSURE: 123 MMHG

## 2021-05-11 DIAGNOSIS — F51.04 PSYCHOPHYSIOLOGIC INSOMNIA: ICD-10-CM

## 2021-05-11 LAB — HCG UR QL: NEGATIVE

## 2021-05-11 PROCEDURE — 81025 URINE PREGNANCY TEST: CPT

## 2021-05-11 PROCEDURE — 700105 HCHG RX REV CODE 258: Performed by: SPECIALIST

## 2021-05-11 PROCEDURE — 500854 HCHG NEEDLE, INSUFFLATION FOR STEP: Performed by: SPECIALIST

## 2021-05-11 PROCEDURE — 160048 HCHG OR STATISTICAL LEVEL 1-5: Performed by: SPECIALIST

## 2021-05-11 PROCEDURE — A9270 NON-COVERED ITEM OR SERVICE: HCPCS | Performed by: SPECIALIST

## 2021-05-11 PROCEDURE — 160025 RECOVERY II MINUTES (STATS): Performed by: SPECIALIST

## 2021-05-11 PROCEDURE — 160046 HCHG PACU - 1ST 60 MINS PHASE II: Performed by: SPECIALIST

## 2021-05-11 PROCEDURE — 160002 HCHG RECOVERY MINUTES (STAT): Performed by: SPECIALIST

## 2021-05-11 PROCEDURE — 160035 HCHG PACU - 1ST 60 MINS PHASE I: Performed by: SPECIALIST

## 2021-05-11 PROCEDURE — 160041 HCHG SURGERY MINUTES - EA ADDL 1 MIN LEVEL 4: Performed by: SPECIALIST

## 2021-05-11 PROCEDURE — 501838 HCHG SUTURE GENERAL: Performed by: SPECIALIST

## 2021-05-11 PROCEDURE — 700111 HCHG RX REV CODE 636 W/ 250 OVERRIDE (IP)

## 2021-05-11 PROCEDURE — 700101 HCHG RX REV CODE 250

## 2021-05-11 PROCEDURE — 88305 TISSUE EXAM BY PATHOLOGIST: CPT

## 2021-05-11 PROCEDURE — 501330 HCHG SET, CYSTO IRRIG TUBING: Performed by: SPECIALIST

## 2021-05-11 PROCEDURE — 160009 HCHG ANES TIME/MIN: Performed by: SPECIALIST

## 2021-05-11 PROCEDURE — 160029 HCHG SURGERY MINUTES - 1ST 30 MINS LEVEL 4: Performed by: SPECIALIST

## 2021-05-11 PROCEDURE — 501579 HCHG TROCAR, STEP 5MM: Performed by: SPECIALIST

## 2021-05-11 PROCEDURE — 500886 HCHG PACK, LAPAROSCOPY: Performed by: SPECIALIST

## 2021-05-11 PROCEDURE — 700101 HCHG RX REV CODE 250: Performed by: SPECIALIST

## 2021-05-11 PROCEDURE — 501394 HCHG SOLUTION SORBITOL 3% 3000ML BAG: Performed by: SPECIALIST

## 2021-05-11 RX ORDER — OXYCODONE HYDROCHLORIDE AND ACETAMINOPHEN 5; 325 MG/1; MG/1
1 TABLET ORAL
Status: DISCONTINUED | OUTPATIENT
Start: 2021-05-11 | End: 2021-05-11 | Stop reason: HOSPADM

## 2021-05-11 RX ORDER — DIPHENHYDRAMINE HYDROCHLORIDE 50 MG/ML
12.5 INJECTION INTRAMUSCULAR; INTRAVENOUS
Status: DISCONTINUED | OUTPATIENT
Start: 2021-05-11 | End: 2021-05-11 | Stop reason: HOSPADM

## 2021-05-11 RX ORDER — ONDANSETRON 2 MG/ML
INJECTION INTRAMUSCULAR; INTRAVENOUS PRN
Status: DISCONTINUED | OUTPATIENT
Start: 2021-05-11 | End: 2021-05-11 | Stop reason: SURG

## 2021-05-11 RX ORDER — OXYCODONE HYDROCHLORIDE AND ACETAMINOPHEN 5; 325 MG/1; MG/1
2 TABLET ORAL
Status: DISCONTINUED | OUTPATIENT
Start: 2021-05-11 | End: 2021-05-11 | Stop reason: HOSPADM

## 2021-05-11 RX ORDER — HALOPERIDOL 5 MG/ML
1 INJECTION INTRAMUSCULAR
Status: DISCONTINUED | OUTPATIENT
Start: 2021-05-11 | End: 2021-05-11 | Stop reason: HOSPADM

## 2021-05-11 RX ORDER — SODIUM CHLORIDE, SODIUM LACTATE, POTASSIUM CHLORIDE, CALCIUM CHLORIDE 600; 310; 30; 20 MG/100ML; MG/100ML; MG/100ML; MG/100ML
INJECTION, SOLUTION INTRAVENOUS CONTINUOUS
Status: DISCONTINUED | OUTPATIENT
Start: 2021-05-11 | End: 2021-05-11 | Stop reason: HOSPADM

## 2021-05-11 RX ORDER — TRAZODONE HYDROCHLORIDE 50 MG/1
TABLET ORAL
Qty: 30 TABLET | Refills: 2 | Status: SHIPPED | OUTPATIENT
Start: 2021-05-11 | End: 2022-01-23

## 2021-05-11 RX ORDER — BUPIVACAINE HYDROCHLORIDE 2.5 MG/ML
INJECTION, SOLUTION EPIDURAL; INFILTRATION; INTRACAUDAL
Status: DISCONTINUED
Start: 2021-05-11 | End: 2021-05-11 | Stop reason: HOSPADM

## 2021-05-11 RX ORDER — NEOSTIGMINE METHYLSULFATE 1 MG/ML
INJECTION, SOLUTION INTRAVENOUS PRN
Status: DISCONTINUED | OUTPATIENT
Start: 2021-05-11 | End: 2021-05-11 | Stop reason: SURG

## 2021-05-11 RX ORDER — ONDANSETRON 2 MG/ML
4 INJECTION INTRAMUSCULAR; INTRAVENOUS
Status: DISCONTINUED | OUTPATIENT
Start: 2021-05-11 | End: 2021-05-11 | Stop reason: HOSPADM

## 2021-05-11 RX ORDER — PROMETHAZINE HYDROCHLORIDE 25 MG/1
12.5 SUPPOSITORY RECTAL EVERY 4 HOURS PRN
Status: DISCONTINUED | OUTPATIENT
Start: 2021-05-11 | End: 2021-05-11 | Stop reason: HOSPADM

## 2021-05-11 RX ORDER — SIMETHICONE 80 MG
80 TABLET,CHEWABLE ORAL EVERY 8 HOURS PRN
Status: DISCONTINUED | OUTPATIENT
Start: 2021-05-11 | End: 2021-05-11 | Stop reason: HOSPADM

## 2021-05-11 RX ORDER — KETOROLAC TROMETHAMINE 30 MG/ML
INJECTION, SOLUTION INTRAMUSCULAR; INTRAVENOUS PRN
Status: DISCONTINUED | OUTPATIENT
Start: 2021-05-11 | End: 2021-05-11 | Stop reason: SURG

## 2021-05-11 RX ORDER — DEXAMETHASONE SODIUM PHOSPHATE 4 MG/ML
INJECTION, SOLUTION INTRA-ARTICULAR; INTRALESIONAL; INTRAMUSCULAR; INTRAVENOUS; SOFT TISSUE PRN
Status: DISCONTINUED | OUTPATIENT
Start: 2021-05-11 | End: 2021-05-11 | Stop reason: SURG

## 2021-05-11 RX ORDER — BUPIVACAINE HYDROCHLORIDE AND EPINEPHRINE 2.5; 5 MG/ML; UG/ML
INJECTION, SOLUTION EPIDURAL; INFILTRATION; INTRACAUDAL; PERINEURAL
Status: DISCONTINUED | OUTPATIENT
Start: 2021-05-11 | End: 2021-05-11 | Stop reason: HOSPADM

## 2021-05-11 RX ADMIN — GLYCOPYRROLATE 0.3 MG: 0.2 INJECTION INTRAMUSCULAR; INTRAVENOUS at 15:57

## 2021-05-11 RX ADMIN — FENTANYL CITRATE 25 MCG: 50 INJECTION, SOLUTION INTRAMUSCULAR; INTRAVENOUS at 16:38

## 2021-05-11 RX ADMIN — POVIDONE IODINE 15 ML: 100 SOLUTION TOPICAL at 14:24

## 2021-05-11 RX ADMIN — DEXAMETHASONE SODIUM PHOSPHATE 8 MG: 4 INJECTION, SOLUTION INTRA-ARTICULAR; INTRALESIONAL; INTRAMUSCULAR; INTRAVENOUS; SOFT TISSUE at 15:27

## 2021-05-11 RX ADMIN — ONDANSETRON 8 MG: 2 INJECTION INTRAMUSCULAR; INTRAVENOUS at 15:27

## 2021-05-11 RX ADMIN — ROCURONIUM BROMIDE 30 MG: 10 INJECTION, SOLUTION INTRAVENOUS at 15:27

## 2021-05-11 RX ADMIN — NEOSTIGMINE METHYLSULFATE 3 MG: 1 INJECTION INTRAVENOUS at 15:54

## 2021-05-11 RX ADMIN — PROPOFOL 200 MG: 10 INJECTION, EMULSION INTRAVENOUS at 15:27

## 2021-05-11 RX ADMIN — FENTANYL CITRATE 250 MCG: 50 INJECTION, SOLUTION INTRAMUSCULAR; INTRAVENOUS at 15:27

## 2021-05-11 RX ADMIN — MIDAZOLAM 2 MG: 1 INJECTION INTRAMUSCULAR; INTRAVENOUS at 16:03

## 2021-05-11 RX ADMIN — KETOROLAC TROMETHAMINE 30 MG: 30 INJECTION, SOLUTION INTRAMUSCULAR at 15:27

## 2021-05-11 RX ADMIN — SODIUM CHLORIDE, POTASSIUM CHLORIDE, SODIUM LACTATE AND CALCIUM CHLORIDE: 600; 310; 30; 20 INJECTION, SOLUTION INTRAVENOUS at 14:24

## 2021-05-11 ASSESSMENT — PAIN DESCRIPTION - PAIN TYPE
TYPE: SURGICAL PAIN
TYPE: CHRONIC PAIN
TYPE: SURGICAL PAIN

## 2021-05-11 ASSESSMENT — FIBROSIS 4 INDEX: FIB4 SCORE: 0.32

## 2021-05-11 NOTE — OR SURGEON
Immediate Post OP Note    PreOp Diagnosis: Refractory pelvic pain, severe dysmenorrhea and   dyspareunia.      PostOp Diagnosis: Same; pelvic endometriosis      Procedure(s):  PELVISCOPY - DIAGNOSTIC,  OPERATIVE, FULGRATION OF ENDOMETRIOSIS, - Wound Class: Clean Contaminated  HYSTEROSCOPY, DIAGNOSTIC - Wound Class: Clean Contaminated    Surgeon(s):  Brendan Baldwin M.D.    Anesthesiologist/Type of Anesthesia:  Anesthesiologist: Chang Latif M.D./General    Surgical Staff:  Circulator: Ashley Jackson R.N.; Sudha Linton R.N.  Scrub Person: Walt Pinto    Specimens removed if any:  ID Type Source Tests Collected by Time Destination   A : Posterior cul de sac biopsy  Tissue Other PATHOLOGY SPECIMEN Brendan Baldwin M.D. 5/11/2021  3:45 PM        Estimated Blood Loss: Less than 5 ccs    Findings: Endometriosis implants in the posterior cul de sac as well as the cystic lesion that was resected to evaluate other cystic lesions in the posterior cul de sac. Sloughing endometrium on hysteroscopy.     Complications: None        5/11/2021 3:55 PM Brendan Baldwin M.D.

## 2021-05-11 NOTE — ANESTHESIA PREPROCEDURE EVALUATION
Relevant Problems   NEURO   (+) History of self-harm      CARDIAC   (+) Intractable migraine without status migrainosus       Physical Exam    Airway   Mallampati: II  TM distance: >3 FB  Neck ROM: full       Cardiovascular - normal exam  Rhythm: regular  Rate: normal  (-) murmur     Dental - normal exam           Pulmonary - normal exam  Breath sounds clear to auscultation     Abdominal    Neurological - normal exam                 Anesthesia Plan    ASA 2       Plan - general       Airway plan will be ETT          Induction: intravenous    Postoperative Plan: Postoperative administration of opioids is intended.    Pertinent diagnostic labs and testing reviewed    Informed Consent:    Anesthetic plan and risks discussed with patient.    Use of blood products discussed with: patient whom consented to blood products.

## 2021-05-11 NOTE — DISCHARGE INSTRUCTIONS
ACTIVITY: Rest and take it easy for the first 24 hours.  A responsible adult is recommended to remain with you during that time.  It is normal to feel sleepy.  We encourage you to not do anything that requires balance, judgment or coordination.    MILD FLU-LIKE SYMPTOMS ARE NORMAL. YOU MAY EXPERIENCE GENERALIZED MUSCLE ACHES, THROAT IRRITATION, HEADACHE AND/OR SOME NAUSEA.    FOR 24 HOURS DO NOT:  Drive, operate machinery or run household appliances.  Drink beer or alcoholic beverages.   Make important decisions or sign legal documents.    SPECIAL INSTRUCTIONS: see attached    DIET: To avoid nausea, slowly advance diet as tolerated, avoiding spicy or greasy foods for the first day.  Add more substantial food to your diet according to your physician's instructions.  Babies can be fed formula or breast milk as soon as they are hungry.  INCREASE FLUIDS AND FIBER TO AVOID CONSTIPATION.    SURGICAL DRESSING/BATHING: shower tomorrow, no baths    FOLLOW-UP APPOINTMENT:  A follow-up appointment should be arranged with your doctor; call to schedule.    You should CALL YOUR PHYSICIAN if you develop:  Fever greater than 101 degrees F.  Pain not relieved by medication, or persistent nausea or vomiting.  Excessive bleeding (blood soaking through dressing) or unexpected drainage from the wound.  Extreme redness or swelling around the incision site, drainage of pus or foul smelling drainage.  Inability to urinate or empty your bladder within 8 hours.  Problems with breathing or chest pain.    You should call 911 if you develop problems with breathing or chest pain.  If you are unable to contact your doctor or surgical center, you should go to the nearest emergency room or urgent care center.  Physician's telephone #: [phone]    If any questions arise, call your doctor.  If your doctor is not available, please feel free to call the Surgical Center at [phone]. The Contact Center is open Monday through Friday 7AM to 5PM  and may speak to a nurse at [phone], or toll free at [phone].     A registered nurse may call you a few days after your surgery to see how you are doing after your procedure.    MEDICATIONS: Resume taking daily medication.  Take prescribed pain medication with food.  If no medication is prescribed, you may take non-aspirin pain medication if needed.  PAIN MEDICATION CAN BE VERY CONSTIPATING.  Take a stool softener or laxative such as senokot, pericolace, or milk of magnesia if needed.        If your physician has prescribed pain medication that includes Acetaminophen (Tylenol), do not take additional Acetaminophen (Tylenol) while taking the prescribed medication.    Depression / Suicide Risk    As you are discharged from this [facility] facility, it is important to learn how to keep safe from harming yourself.    Recognize the warning signs:  · Abrupt changes in personality, positive or negative- including increase in energy   · Giving away possessions  · Change in eating patterns- significant weight changes-  positive or negative  · Change in sleeping patterns- unable to sleep or sleeping all the time   · Unwillingness or inability to communicate  · Depression  · Unusual sadness, discouragement and loneliness  · Talk of wanting to die  · Neglect of personal appearance   · Rebelliousness- reckless behavior  · Withdrawal from people/activities they love  · Confusion- inability to concentrate     If you or a loved one observes any of these behaviors or has concerns about self-harm, here's what you can do:  · Talk about it- your feelings and reasons for harming yourself  · Remove any means that you might use to hurt yourself (examples: pills, rope, extension cords, firearm)  · Get professional help from the community (Mental Health, Substance Abuse, psychological counseling)  · Do not be alone:Call your Safe Contact- someone whom you trust who will be there for you.  · Call your local CRISIS HOTLINE  127-6330 or 599-666-3643  · Call your local Children's Mobile Crisis Response Team [place] (713) 288-1766 or www.[employer]  · Call the toll free National Suicide Prevention Hotlines   · National Suicide Prevention Lifeline 821-538-BFYW (5916)  · National [employer] Line Network 800-SUICIDE (021-3632)

## 2021-05-11 NOTE — TELEPHONE ENCOUNTER
Dear Sulema,     Your prescription for   Requested Prescriptions     Pending Prescriptions Disp Refills   • traZODone (DESYREL) 50 MG Tab [Pharmacy Med Name: TRAZODONE 50MG TABLETS]  3     Sig: TAKE 1 TABLET BY MOUTH AT BEDTIME AS NEEDED FOR SLEEP        has been sent to the   [id number] - [place], [place] - [address] AT [facility] & [address]  [address] [place] [address]  Phone: [phone] Fax: [phone]    [id number] - [place] - [address] AT [facility] [address] [facility] & [facility]  [address] [place] [address]  Phone: [phone] Fax: [phone]      Please contact your pharmacy to see when it will be ready for you to .      Thank you,     Fartun Rodriguez, Med Ass't

## 2021-05-11 NOTE — ANESTHESIA TIME REPORT
Anesthesia Start and Stop Event Times     Date Time Event    5/11/2021 1518 Ready for Procedure     1527 Anesthesia Start     1608 Anesthesia Stop        Responsible Staff  05/11/21    Name Role Begin End    Chang Latif M.D. Anesth 1527 1608        Preop Diagnosis (Free Text):  Pre-op Diagnosis     PELVIC PAIN, DYSMENORRHEA, DYSPAREUNIA        Preop Diagnosis (Codes):    Post op Diagnosis  Pelvic pain      Premium Reason  A. 3PM - 7AM    Comments:

## 2021-05-11 NOTE — ANESTHESIA PROCEDURE NOTES
Airway    Date/Time: 5/11/2021 3:29 PM  Performed by: Chang Latif M.D.  Authorized by: Chang Latif M.D.     Location:  OR  Urgency:  Elective  Indications for Airway Management:  Anesthesia      Spontaneous Ventilation: absent    Sedation Level:  Deep  Preoxygenated: Yes    Patient Position:  Sniffing  Final Airway Type:  Endotracheal airway  Final Endotracheal Airway:  ETT  Cuffed: Yes    Technique Used for Successful ETT Placement:  Direct laryngoscopy    Insertion Site:  Oral  Blade Type:  Arnold  Laryngoscope Blade/Videolaryngoscope Blade Size:  3  ETT Size (mm):  6.5  Measured from:  Teeth  ETT to Teeth (cm):  22  Placement Verified by: auscultation and capnometry    Cormack-Lehane Classification:  Grade I - full view of glottis  Number of Attempts at Approach:  1

## 2021-05-11 NOTE — ANESTHESIA POSTPROCEDURE EVALUATION
Patient: Sulema Bull    Procedure Summary     Date: 05/11/21 Room / Location: [facility] ROOM 23 / SURGERY SAME DAY [facility]    Anesthesia Start: 1527 Anesthesia Stop: 1608    Procedures:       PELVISCOPY - DIAGNOSTIC,  OPERATIVE, FULGRATION OF ENDOMETRIOSIS, (N/A Abdomen)      HYSTEROSCOPY, DIAGNOSTIC (N/A Vagina ) Diagnosis: (PELVIC PAIN, DYSMENORRHEA, DYSPAREUNIA, ENDOMETRIOSIS)    Surgeons: Brendan Baldwin M.D. Responsible Provider: Chang Latif M.D.    Anesthesia Type: general ASA Status: 2          Final Anesthesia Type: general  Last vitals  BP   Blood Pressure: 140/77    Temp   37.3 °C (99.2 °F)    Pulse   (!) 102   Resp   15    SpO2   99 %      Anesthesia Post Evaluation    Patient location during evaluation: PACU  Patient participation: complete - patient participated  Level of consciousness: awake and alert    Airway patency: patent  Anesthetic complications: no  Cardiovascular status: hemodynamically stable  Respiratory status: acceptable  Hydration status: euvolemic    PONV: none          No complications documented.     Nurse Pain Score: 4 (NPRS)

## 2021-05-12 LAB — PATHOLOGY CONSULT NOTE: NORMAL

## 2021-05-12 NOTE — OP REPORT
DATE OF SERVICE:  05/11/2021     PREOPERATIVE DIAGNOSES:  Refractory pelvic pain, dysmenorrhea, dyspareunia.     POSTOPERATIVE DIAGNOSES:  Refractory pelvic pain, dysmenorrhea, dyspareunia;   pelvic endometriosis.     PROCEDURES:  Diagnostic laparoscopy, laparoscopic fulguration of   endometriosis, laparoscopic resection of posterior cul-de-sac cystic lesion,   diagnostic hysteroscopy.     SURGEON:  Brendan Baldwin MD     ANESTHESIA:  General.     ANESTHESIOLOGIST:  Chang Latif MD     ESTIMATED BLOOD LOSS FOR THE PROCEDURE:  Less than 5 mL.     FINDINGS:  Endometriosis implants located in the posterior cul-de-sac were   fulgurated under direct laparoscopic visualization as well as resection of the   cystic lesion to evaluate and confirm additional endometriosis lesions in the   posterior cul-de-sac with Michael-Masters defects also located in the posterior   cul-de-sac, sloughing endometrium on diagnostic hysteroscopy.     DESCRIPTION OF PROCEDURE:  The patient was taken to the operating room where   general anesthesia was performed without difficulty.  The patient was then   prepped and draped in the usual sterile fashion.  Lower extremities placed in   Michael stirrups.  Attention was first turned to the perineum where speculum was   placed and used a Simmons retractor.  Single-tooth tenaculum was placed on the   anterior lip of the cervix.  Hulka uterine manipulator was then placed.    Single-tooth tenaculum and retractors then removed.  Attention was then turned   to the umbilicus where a 5 mm incision was made.  A Veress needle was placed.    A 3.5 L of carboperitoneum were then obtained.  An additional 5 mm port was   placed approximately through the way from the umbilicus to the anterior   superior iliac spine lateral to the rectus muscle under direct laparoscopic   visualization.  The endometriosis implants were then fulgurated under direct   laparoscopic visualization.  There were additional cystic lesions  in the   posterior cul-de-sac, which were also possibly consistent with endometriosis   and thus the most representative of these lesions was resected to confirm   additional endometriosis implants located in the posterior cul-de-sac.  There   were Michael-Masters defects and posterior cul-de-sac with an otherwise normal   appearing abdomen and pelvis to laparoscopic visualization.  Once the   fulguration resection had been completed, excellent hemostasis was   appreciated.  A 3.5 L of carboperitoneum were removed followed by removal of   other ports under direct laparoscopic visualization.  The incisions were then   reapproximated with single interrupted sutures using 4-0 Vicryl, injected with   30 mL of 0.25% Marcaine with epinephrine.  Attention was then turned to the   perineum where a weighted speculum was placed with the Simmons retractor.  A   Hulka uterine inhibitor was then removed.  The diagnostic hysteroscope was   then placed.  Sloughing endometrium identified.  No intrauterine endometrial   pathology appreciated.  Procedure was then deemed complete.  The single tooth   tenaculum taken off the anterior lip of the cervix.  The patient tolerated the   procedure well and was awoken from general anesthesia, was taken to recovery   in stable condition.        ______________________________  MD BLANCA DE LA TORRE/KEISHA/ENRIKE    DD:  05/11/2021 16:01  DT:  05/11/2021 16:51    Job#:  [id number]

## 2021-06-17 ENCOUNTER — HOSPITAL ENCOUNTER (EMERGENCY)
Dept: HOSPITAL 8 - ED | Age: 26
Discharge: LEFT BEFORE DISCHARGE | End: 2021-06-17
Payer: SELF-PAY

## 2021-06-17 ENCOUNTER — HOSPITAL ENCOUNTER (EMERGENCY)
Facility: MEDICAL CENTER | Age: 26
End: 2021-06-17
Attending: EMERGENCY MEDICINE
Payer: COMMERCIAL

## 2021-06-17 VITALS — SYSTOLIC BLOOD PRESSURE: 162 MMHG | DIASTOLIC BLOOD PRESSURE: 75 MMHG

## 2021-06-17 VITALS — WEIGHT: 154.32 LBS | BODY MASS INDEX: 30.3 KG/M2 | HEIGHT: 60 IN

## 2021-06-17 VITALS
DIASTOLIC BLOOD PRESSURE: 81 MMHG | OXYGEN SATURATION: 97 % | HEIGHT: 60 IN | WEIGHT: 154.98 LBS | RESPIRATION RATE: 18 BRPM | HEART RATE: 89 BPM | BODY MASS INDEX: 30.43 KG/M2 | TEMPERATURE: 98.4 F | SYSTOLIC BLOOD PRESSURE: 130 MMHG

## 2021-06-17 DIAGNOSIS — L02.91 ABSCESS: ICD-10-CM

## 2021-06-17 DIAGNOSIS — R03.0 ELEVATED BLOOD PRESSURE READING: ICD-10-CM

## 2021-06-17 DIAGNOSIS — S01.00XA OPEN WOUND OF SCALP, UNSPECIFIED OPEN WOUND TYPE, INITIAL ENCOUNTER: ICD-10-CM

## 2021-06-17 DIAGNOSIS — Z53.21: ICD-10-CM

## 2021-06-17 DIAGNOSIS — R51.9: Primary | ICD-10-CM

## 2021-06-17 LAB — HCG UR QL: POSITIVE

## 2021-06-17 PROCEDURE — 99283 EMERGENCY DEPT VISIT LOW MDM: CPT

## 2021-06-17 PROCEDURE — 81025 URINE PREGNANCY TEST: CPT

## 2021-06-17 RX ORDER — CLINDAMYCIN HYDROCHLORIDE 300 MG/1
300 CAPSULE ORAL 3 TIMES DAILY
Qty: 15 CAPSULE | Refills: 0 | Status: SHIPPED | OUTPATIENT
Start: 2021-06-17 | End: 2021-06-22

## 2021-06-17 RX ORDER — SULFAMETHOXAZOLE AND TRIMETHOPRIM 800; 160 MG/1; MG/1
1 TABLET ORAL 2 TIMES DAILY
Qty: 10 TABLET | Refills: 0 | Status: SHIPPED | OUTPATIENT
Start: 2021-06-17 | End: 2021-06-17

## 2021-06-17 ASSESSMENT — FIBROSIS 4 INDEX: FIB4 SCORE: 0.32

## 2021-06-18 ENCOUNTER — PATIENT OUTREACH (OUTPATIENT)
Dept: HEALTH INFORMATION MANAGEMENT | Facility: OTHER | Age: 26
End: 2021-06-18

## 2021-06-18 NOTE — PROGRESS NOTES
6.18.21  Received incoming order from [facility] [place] to schedule patient with a PCP appointment. Patient has [employer] insurance which is not accepted here at [facility]. Patient can be seen at [facility] or [facility]. CHW called patient but there was no answer. CHW left a VM for patient with this worker's contact information.  CHW will call patient a second time to schedule a PCP appointment.     6.21.21   CHW called patient a second time to schedule PCP appointment but there was no answer. CHW left a VM for patient     6.22.21   CHW called patient a third time to schedule PCP appointment but there was no answer. CHW left a VM for patient with this worker's contact information and [facility] information as well. CHW will discharge patient from list due to inability to contact.

## 2021-06-18 NOTE — ED TRIAGE NOTES
"26 yo female presents to triage with reports of wound on top of scalp for \"quite awhile\"  Patient report it keeps closing up and then opening and sometimes it pops.  When it does open up patient reports greenish, yellow and white drainage.  Wound red and slight drainage currently.  Patient holding a dressing on the area. Denies fever or injury to the site    "

## 2021-06-18 NOTE — ED PROVIDER NOTES
ED Provider Note    CHIEF COMPLAINT  Chief Complaint   Patient presents with   • Wound Check     Top of head        HPI             25 y.o. F p/w CC wound to top of right scalp.  PT reports hitting head 10 months ago and since that time she reports hard bump in location of current wound.   She reports the wound opened 2 days ago.  Pt reports increased size since then  Pt reports pus and blood expressed today.  No fever.  No prior hx of MRSA.  No other wounds.  No new or different vaginal discharge or vaginal bleeding.  Patient reports that she is incidentally pregnant and has had multiple positive pregnancy test at home.        REVIEW OF SYSTEMS  Patient denies any fever or cough or shortness of breath or other rashes or wounds.  All other systems reviewed by me and are unremarkable      PAST MEDICAL HISTORY   has a past medical history of Asthma, Depression, and Pain (12/20/2019).    SOCIAL HISTORY  Social History     Tobacco Use   • Smoking status: Never Smoker   • Smokeless tobacco: Never Used   Vaping Use   • Vaping Use: Never used   Substance and Sexual Activity   • Alcohol use: Not Currently     Alcohol/week: 0.0 oz   • Drug use: Yes     Types: Marijuana, Inhaled, Oral     Comment: Marijuana daily    • Sexual activity: Never     Partners: Male     Birth control/protection: Pill       SURGICAL HISTORY   has a past surgical history that includes marilin by laparoscopy (N/A, 12/23/2019); colonoscopy (2013); cholecystectomy (2019); hysteroscopy,dx,sep proc (N/A, 5/11/2021); and lap,diagnostic abdomen (N/A, 5/11/2021).    CURRENT MEDICATIONS  Home Medications    **Home medications have not yet been reviewed for this encounter**         ALLERGIES  Not on File    PHYSICAL EXAM  VITAL SIGNS: /81   Pulse 89   Temp 36.9 °C (98.4 °F) (Temporal)   Resp 18   Ht 1.524 m (5')   Wt 70.3 kg (154 lb 15.7 oz)   LMP 05/11/2021 (Exact Date)   SpO2 97%   Breastfeeding No   BMI 30.27 kg/m²  @MATT[[id number]::@  Pulse ox  interpretation: I interpret this pulse ox as normal.    Physical Exam  HENT:      Head: Normocephalic.        Right Ear: External ear normal.      Left Ear: External ear normal.   Eyes:      General: No scleral icterus.     Conjunctiva/sclera: Conjunctivae normal.   Cardiovascular:      Rate and Rhythm: Normal rate.   Pulmonary:      Effort: Pulmonary effort is normal. No respiratory distress.      Breath sounds: No stridor. No wheezing.   Abdominal:      General: There is no distension.      Tenderness: There is no abdominal tenderness.   Musculoskeletal:         General: No deformity. Normal range of motion.      Cervical back: Normal range of motion.   Skin:     General: Skin is warm and dry.      Findings: No erythema or rash.   Neurological:      Mental Status: She is alert and oriented to person, place, and time.      Coordination: Coordination normal.   Psychiatric:         Mood and Affect: Affect normal.         Judgment: Judgment normal.             DIAGNOSTIC STUDIES / PROCEDURES    LABS/EKG  Results for orders placed or performed during the hospital encounter of 06/17/21   BETA-HCG QUALITATIVE URINE   Result Value Ref Range    Beta-Hcg Urine Positive (A) Negative       RADIOLOGY  No orders to display        COURSE & MEDICAL DECISION MAKING  Pertinent Labs & Imaging studies reviewed by me. (See chart for details)  I verified that the patient was wearing a mask and I was wearing appropriate PPE every time I entered the room. The patient's mask was on the patient at all times during my encounter except for a brief view of the oropharynx.     25 y.o. female  p/w scalp wound.     Differential diagnosis includes but is not limited to:    Hx and PE c/w spontaneously draining scalp abscess in incidentally pregnant female.   Patient agrees to call to schedule close follow-up appointment with pregnancy center/GYN and will discuss her elevated blood pressure reading at this time.    Patient prescribed clindamycin  given positive pregnancy   Pt w/ no crepitus or tissue necrosis to suggest concern for Necrotizing fascitis at this time  Pt agrees to immediately return to ED if worsen or if pain worsens.  No hx to suggest underlying osteonecrosis or retained foreign body  No area of flutuance on exam or obvious fluid collection  Plan d/c home on antibx  I discussed with patient given chronicity of wound to try a trial of antibiotics but also to obtain a follow-up appointment with a primary care physician in case wound is not completely healed for potential dermatology referral to rule out oncologic etiology.      FINAL IMPRESSION  Visit Diagnoses     ICD-10-CM   1. Open wound of scalp, unspecified open wound type, initial encounter  S01.00XA   2. Abscess  L02.91   3. Elevated blood pressure reading  R03.0              Electronically signed by: Sal Smith M.D., 6/17/2021 10:13 PM

## 2021-08-11 LAB
ABO GROUP BLD: NORMAL
BLD GP AB SCN SERPL QL: NORMAL
HBV SURFACE AG SERPL QL IA: NEGATIVE
HIV 1+2 AB+HIV1 P24 AG SERPL QL IA: NONREACTIVE
RH BLD: NORMAL
RUBV IGG SERPL IA-ACNC: NORMAL
TREPONEMA PALLIDUM IGG+IGM AB [PRESENCE] IN SERUM OR PLASMA BY IMMUNOASSAY: NONREACTIVE

## 2021-08-24 ENCOUNTER — HOSPITAL ENCOUNTER (EMERGENCY)
Facility: MEDICAL CENTER | Age: 26
End: 2021-08-24
Payer: COMMERCIAL

## 2021-08-24 VITALS
WEIGHT: 158.51 LBS | TEMPERATURE: 98.9 F | HEIGHT: 60 IN | DIASTOLIC BLOOD PRESSURE: 74 MMHG | SYSTOLIC BLOOD PRESSURE: 114 MMHG | OXYGEN SATURATION: 98 % | RESPIRATION RATE: 16 BRPM | BODY MASS INDEX: 31.12 KG/M2 | HEART RATE: 89 BPM

## 2021-08-24 LAB
ALBUMIN SERPL BCP-MCNC: 3.6 G/DL (ref 3.2–4.9)
ALBUMIN/GLOB SERPL: 1.2 G/DL
ALP SERPL-CCNC: 44 U/L (ref 30–99)
ALT SERPL-CCNC: 73 U/L (ref 2–50)
ANION GAP SERPL CALC-SCNC: 10 MMOL/L (ref 7–16)
APPEARANCE UR: CLEAR
AST SERPL-CCNC: 37 U/L (ref 12–45)
B-HCG SERPL-ACNC: ABNORMAL MIU/ML (ref 0–10)
BASOPHILS # BLD AUTO: 0.5 % (ref 0–1.8)
BASOPHILS # BLD: 0.05 K/UL (ref 0–0.12)
BILIRUB SERPL-MCNC: 0.2 MG/DL (ref 0.1–1.5)
BILIRUB UR QL STRIP.AUTO: NEGATIVE
BUN SERPL-MCNC: 9 MG/DL (ref 8–22)
CALCIUM SERPL-MCNC: 9 MG/DL (ref 8.4–10.2)
CHLORIDE SERPL-SCNC: 103 MMOL/L (ref 96–112)
CO2 SERPL-SCNC: 22 MMOL/L (ref 20–33)
COLOR UR: YELLOW
CREAT SERPL-MCNC: 0.33 MG/DL (ref 0.5–1.4)
EOSINOPHIL # BLD AUTO: 0.13 K/UL (ref 0–0.51)
EOSINOPHIL NFR BLD: 1.2 % (ref 0–6.9)
ERYTHROCYTE [DISTWIDTH] IN BLOOD BY AUTOMATED COUNT: 42.8 FL (ref 35.9–50)
GLOBULIN SER CALC-MCNC: 3.1 G/DL (ref 1.9–3.5)
GLUCOSE SERPL-MCNC: 91 MG/DL (ref 65–99)
GLUCOSE UR STRIP.AUTO-MCNC: NEGATIVE MG/DL
HCT VFR BLD AUTO: 35.9 % (ref 37–47)
HGB BLD-MCNC: 12.4 G/DL (ref 12–16)
IMM GRANULOCYTES # BLD AUTO: 0.06 K/UL (ref 0–0.11)
IMM GRANULOCYTES NFR BLD AUTO: 0.5 % (ref 0–0.9)
KETONES UR STRIP.AUTO-MCNC: NEGATIVE MG/DL
LEUKOCYTE ESTERASE UR QL STRIP.AUTO: NEGATIVE
LIPASE SERPL-CCNC: 21 U/L (ref 7–58)
LYMPHOCYTES # BLD AUTO: 1.88 K/UL (ref 1–4.8)
LYMPHOCYTES NFR BLD: 17.2 % (ref 22–41)
MCH RBC QN AUTO: 31.9 PG (ref 27–33)
MCHC RBC AUTO-ENTMCNC: 34.5 G/DL (ref 33.6–35)
MCV RBC AUTO: 92.3 FL (ref 81.4–97.8)
MICRO URNS: NORMAL
MONOCYTES # BLD AUTO: 0.82 K/UL (ref 0–0.85)
MONOCYTES NFR BLD AUTO: 7.5 % (ref 0–13.4)
NEUTROPHILS # BLD AUTO: 8 K/UL (ref 2–7.15)
NEUTROPHILS NFR BLD: 73.1 % (ref 44–72)
NITRITE UR QL STRIP.AUTO: NEGATIVE
NRBC # BLD AUTO: 0 K/UL
NRBC BLD-RTO: 0 /100 WBC
PH UR STRIP.AUTO: 7 [PH] (ref 5–8)
PLATELET # BLD AUTO: 302 K/UL (ref 164–446)
PMV BLD AUTO: 8.3 FL (ref 9–12.9)
POTASSIUM SERPL-SCNC: 4.5 MMOL/L (ref 3.6–5.5)
PROT SERPL-MCNC: 6.7 G/DL (ref 6–8.2)
PROT UR QL STRIP: NEGATIVE MG/DL
RBC # BLD AUTO: 3.89 M/UL (ref 4.2–5.4)
RBC UR QL AUTO: NEGATIVE
SODIUM SERPL-SCNC: 135 MMOL/L (ref 135–145)
SP GR UR STRIP.AUTO: 1.02
WBC # BLD AUTO: 10.9 K/UL (ref 4.8–10.8)

## 2021-08-24 PROCEDURE — 81003 URINALYSIS AUTO W/O SCOPE: CPT

## 2021-08-24 PROCEDURE — 84702 CHORIONIC GONADOTROPIN TEST: CPT

## 2021-08-24 PROCEDURE — 80053 COMPREHEN METABOLIC PANEL: CPT

## 2021-08-24 PROCEDURE — 85025 COMPLETE CBC W/AUTO DIFF WBC: CPT

## 2021-08-24 PROCEDURE — 83690 ASSAY OF LIPASE: CPT

## 2021-08-24 PROCEDURE — 302449 STATCHG TRIAGE ONLY (STATISTIC)

## 2021-08-24 ASSESSMENT — FIBROSIS 4 INDEX: FIB4 SCORE: 0.32

## 2021-08-24 NOTE — ED TRIAGE NOTES
Chief Complaint   Patient presents with   • Pregnancy     Pt is 15 weeks pregnant.    • Abdominal Cramping     Pelvic cramping starting today with light pink discharge.    Pt is not vaccinated for covid.     Denies urinary symptoms.

## 2021-11-30 ENCOUNTER — HOSPITAL ENCOUNTER (OUTPATIENT)
Facility: MEDICAL CENTER | Age: 26
End: 2021-11-30
Attending: SPECIALIST
Payer: COMMERCIAL

## 2021-11-30 LAB — FIBRONECTIN FETAL SPEC QL: NEGATIVE

## 2021-11-30 PROCEDURE — 82731 ASSAY OF FETAL FIBRONECTIN: CPT

## 2022-01-23 ENCOUNTER — OFFICE VISIT (OUTPATIENT)
Dept: URGENT CARE | Facility: PHYSICIAN GROUP | Age: 27
End: 2022-01-23
Payer: COMMERCIAL

## 2022-01-23 ENCOUNTER — HOSPITAL ENCOUNTER (EMERGENCY)
Facility: MEDICAL CENTER | Age: 27
End: 2022-01-23
Attending: SPECIALIST | Admitting: SPECIALIST
Payer: COMMERCIAL

## 2022-01-23 VITALS
DIASTOLIC BLOOD PRESSURE: 64 MMHG | HEART RATE: 112 BPM | SYSTOLIC BLOOD PRESSURE: 119 MMHG | HEIGHT: 62 IN | WEIGHT: 200 LBS | BODY MASS INDEX: 36.8 KG/M2 | TEMPERATURE: 97.1 F | RESPIRATION RATE: 18 BRPM

## 2022-01-23 VITALS
TEMPERATURE: 97.7 F | DIASTOLIC BLOOD PRESSURE: 70 MMHG | OXYGEN SATURATION: 98 % | BODY MASS INDEX: 36.99 KG/M2 | RESPIRATION RATE: 16 BRPM | HEIGHT: 62 IN | SYSTOLIC BLOOD PRESSURE: 106 MMHG | HEART RATE: 111 BPM | WEIGHT: 201 LBS

## 2022-01-23 DIAGNOSIS — R00.0 TACHYCARDIA: ICD-10-CM

## 2022-01-23 DIAGNOSIS — R60.9 EDEMA, UNSPECIFIED TYPE: ICD-10-CM

## 2022-01-23 DIAGNOSIS — Z3A.36 36 WEEKS GESTATION OF PREGNANCY: ICD-10-CM

## 2022-01-23 LAB
APPEARANCE UR: CLEAR
COLOR UR AUTO: YELLOW
GLUCOSE UR QL STRIP.AUTO: NEGATIVE MG/DL
KETONES UR QL STRIP.AUTO: NEGATIVE MG/DL
LEUKOCYTE ESTERASE UR QL STRIP.AUTO: NEGATIVE
NITRITE UR QL STRIP.AUTO: NEGATIVE
PH UR STRIP.AUTO: 7 [PH] (ref 5–8)
PROT UR QL STRIP: NEGATIVE MG/DL
RBC UR QL AUTO: NEGATIVE
SP GR UR STRIP.AUTO: 1.02 (ref 1–1.03)

## 2022-01-23 PROCEDURE — 59025 FETAL NON-STRESS TEST: CPT

## 2022-01-23 PROCEDURE — 302449 STATCHG TRIAGE ONLY (STATISTIC)

## 2022-01-23 PROCEDURE — 99214 OFFICE O/P EST MOD 30 MIN: CPT | Performed by: FAMILY MEDICINE

## 2022-01-23 PROCEDURE — 81002 URINALYSIS NONAUTO W/O SCOPE: CPT

## 2022-01-23 ASSESSMENT — ENCOUNTER SYMPTOMS
BACK PAIN: 1
EDEMA: 1
MYALGIAS: 0
CHILLS: 0
TINGLING: 1
SHORTNESS OF BREATH: 0
FEVER: 0
COUGH: 0
SORE THROAT: 0
VOMITING: 0
FOCAL WEAKNESS: 0
NAUSEA: 0

## 2022-01-23 ASSESSMENT — FIBROSIS 4 INDEX
FIB4 SCORE: 0.39
FIB4 SCORE: 0.39

## 2022-01-23 NOTE — PROGRESS NOTES
1300 - Assumed care of pt. Once tracing reactive, okay for pt to be discharged per Dr. Baldwin. Reactive NST obtained. Pt feels safe to discharge. Pt to follow up in office tomorrow AM with MD.   All discharge instructions gone over with pt. Pt understands when to return with any concerns or changes.

## 2022-01-23 NOTE — PROGRESS NOTES
1240- pt to triage bed 3 with c/o increase swelling to hands and feet. Nonpitting edema noted. No facial swelling noted. Pt denies uc,lof,bleeding,ha,epigastric pain. No clonus noted. Will do repeat bp in 10 mins and call dr ayala about pt    1250- pt turned right lateral from supine    1300- spoke to dr ayala and orders to d/c home and have pt f/u with him in office tomorrow am

## 2022-01-23 NOTE — PROGRESS NOTES
Subjective:   Sulema Bull is a 26 y.o. female who presents for Edema (All over body swelling, hand swelling/pain. Onset 1 day. )        Edema  This is a new (Reports diffuse swelling and worsening localized swelling in the hands over the past day, history of elevated blood pressure recently noted in high risk pregnancy clinic, complains of new onset back pain, denies abdominal pain or cramping, denies vaginal b) problem. Pertinent negatives include no chills, coughing, fever, myalgias, nausea, rash, sore throat or vomiting. Associated symptoms comments: Pregnancy at 36 weeks.     PMH:  has a past medical history of Asthma, Depression, and Pain (12/20/2019).  MEDS:   Current Outpatient Medications:   •  Prenatal Vit-Fe Fumarate-FA (PRENATAL ONE DAILY PO), Take  by mouth., Disp: , Rfl:   •  escitalopram (LEXAPRO) 20 MG tablet, Take 1 Tab by mouth every day. (Patient taking differently: Take 20 mg by mouth every evening.), Disp: 90 Tab, Rfl: 3  ALLERGIES: No Known Allergies  SURGHX:   Past Surgical History:   Procedure Laterality Date   • FL HYSTEROSCOPY,DX,SEP PROC N/A 5/11/2021    Procedure: HYSTEROSCOPY, DIAGNOSTIC;  Surgeon: Brendan Baldwin M.D.;  Location: SURGERY SAME DAY [facility];  Service: Gynecology   • FL LAP,DIAGNOSTIC ABDOMEN N/A 5/11/2021    Procedure: PELVISCOPY - DIAGNOSTIC,  OPERATIVE, FULGRATION OF ENDOMETRIOSIS,;  Surgeon: Brendan Baldwin M.D.;  Location: SURGERY SAME DAY [facility];  Service: Gynecology   • TRISH BY LAPAROSCOPY N/A 12/23/2019    Procedure: CHOLECYSTECTOMY, LAPAROSCOPIC;  Surgeon: Kar Jacobsen M.D.;  Location: SURGERY SAME DAY [facility];  Service: General   • CHOLECYSTECTOMY  2019   • COLONOSCOPY  2013     SOCHX:  reports that she has never smoked. She has never used smokeless tobacco. She reports previous alcohol use. She reports previous drug use. Frequency: 7.00 times per week. Drugs: Marijuana, Inhaled, and Oral.  FH:   Family History   Problem Relation Age  "of Onset   • Alcohol/Drug Father    • Alcohol/Drug Brother      Review of Systems   Constitutional: Negative for chills and fever.   HENT: Negative for sore throat.    Respiratory: Negative for cough and shortness of breath.    Gastrointestinal: Negative for nausea and vomiting.   Genitourinary: Negative for dysuria.   Musculoskeletal: Positive for back pain. Negative for myalgias.   Skin: Negative for rash.   Neurological: Positive for tingling (Bilateral hands). Negative for focal weakness.        Objective:   /70 (BP Location: Left arm, Patient Position: Sitting, BP Cuff Size: Adult)   Pulse (!) 111   Temp 36.5 °C (97.7 °F) (Temporal)   Resp 16   Ht 1.575 m (5' 2\")   Wt 91.2 kg (201 lb)   LMP 05/11/2021   SpO2 98%   BMI 36.76 kg/m²   Physical Exam  Vitals and nursing note reviewed.   Constitutional:       General: She is not in acute distress.     Appearance: She is well-developed.   HENT:      Head: Normocephalic and atraumatic.      Right Ear: External ear normal.      Left Ear: External ear normal.      Nose: Nose normal.      Mouth/Throat:      Mouth: Mucous membranes are moist.   Eyes:      Conjunctiva/sclera: Conjunctivae normal.   Cardiovascular:      Rate and Rhythm: Regular rhythm. Tachycardia present.   Pulmonary:      Effort: Pulmonary effort is normal. No respiratory distress.      Breath sounds: Normal breath sounds.   Abdominal:      General: There is no distension.      Comments: Gravid   Musculoskeletal:         General: Normal range of motion.      Right hand: Swelling present.      Left hand: Swelling present.   Skin:     General: Skin is warm and dry.   Neurological:      General: No focal deficit present.      Mental Status: She is alert and oriented to person, place, and time. Mental status is at baseline.      Gait: Gait (gait at baseline) normal.   Psychiatric:         Judgment: Judgment normal.           Assessment/Plan:   1. 36 weeks gestation of pregnancy    2. " Tachycardia    3. Edema, unspecified type    Other orders  - Prenatal Vit-Fe Fumarate-FA (PRENATAL ONE DAILY PO); Take  by mouth.        Medical Decision Making/Course:  In the context of the clinical presentation of pregnancy at 36 weeks with diffuse edema, history of recent elevated blood pressure, tachycardia on current visit, emergency department and emergent obstetrics evaluation is warranted.  The patient deferred EMS ambulance transfer requested transport by private vehicle.  The [facility] emergency department was advised and the transfer center notified.    In the course of preparing for this visit with review of the pertinent past medical history, recent and past clinic visits, current medications, and performing chart, immunization, medical history and medication reconciliation, and in the further course of obtaining the current history pertinent to the clinic visit today, performing an exam and evaluation, ordering and independently evaluating labs, tests  , and/or procedures, prescribing any recommended new medications as noted above, providing any pertinent counseling and education and recommending further coordination of care, at least  20 minutes of total time were spent during this encounter.        Please note that this dictation was created using voice recognition software. I have worked with consultants from the vendor as well as technical experts from [facility] to optimize the interface. I have made every reasonable attempt to correct obvious errors, but I expect that there are errors of grammar and possibly content that I did not discover before finalizing the note.

## 2022-02-07 ENCOUNTER — HOSPITAL ENCOUNTER (EMERGENCY)
Facility: MEDICAL CENTER | Age: 27
End: 2022-02-07
Attending: SPECIALIST | Admitting: SPECIALIST
Payer: COMMERCIAL

## 2022-02-07 VITALS
RESPIRATION RATE: 18 BRPM | HEART RATE: 103 BPM | BODY MASS INDEX: 39.27 KG/M2 | WEIGHT: 200 LBS | TEMPERATURE: 97.2 F | SYSTOLIC BLOOD PRESSURE: 129 MMHG | DIASTOLIC BLOOD PRESSURE: 82 MMHG | HEIGHT: 60 IN

## 2022-02-07 LAB — CRYSTALS AMN MICRO: NORMAL

## 2022-02-07 PROCEDURE — 59025 FETAL NON-STRESS TEST: CPT

## 2022-02-07 PROCEDURE — 89060 EXAM SYNOVIAL FLUID CRYSTALS: CPT

## 2022-02-07 PROCEDURE — 302449 STATCHG TRIAGE ONLY (STATISTIC)

## 2022-02-07 ASSESSMENT — PAIN SCALES - GENERAL: PAINLEVEL: 0 - NO PAIN

## 2022-02-07 ASSESSMENT — FIBROSIS 4 INDEX: FIB4 SCORE: 0.39

## 2022-02-08 NOTE — PROGRESS NOTES
EDC - 2/15/22 EGA - 38.6     - Pt arrived to labor and delivery for SROM at 1900 pt states she has had some small leaking since then. Pt also states she has had some cramping. Pt placed in room LDA6. External monitors in place X2. Category I FHT at this time. VSS. Pt reports good FM. No complaints of vaginal bleeding.    SSE no gross pooling, discharge noted FERN collected. SVE closed/th/high. FOB at bedside. POC discussed with pt and family members, all questions answered.    Report given to Jesica SAXENA

## 2022-02-08 NOTE — PROGRESS NOTES
2240 - MD Baldwin given report, SVE unable to tell presenting part, cervix closed and thick per RNkaren negative FHT reactive, pt ok for discharge.   2255 - Discharge instructions reviewed with pt including follow-up instructions and return precautions. PT verbalizes understanding and pt discharged in stable condition.

## 2022-02-09 NOTE — H&P
DATE OF ADMISSION:  2022     REASON FOR ADMISSION:  Postdates induction of labor.     HISTORY OF PRESENT ILLNESS:  This is a 26-year-old  1, para 0 at 41   weeks' gestation based on last menstrual period consistent with an 8-week   ultrasound followed by our office and the [facility] during   the course of this pregnancy, who states that she now wished to proceed   forward with a postdates induction of labor.  Risks, benefits and alternatives   have been addressed.  She has asked appropriate questions and wished to   proceed forward with admission as planned.     PAST MEDICAL HISTORY:  Endometriosis, anxiety and depression.     PAST SURGICAL HISTORY:  Laparoscopy for endometriosis, cholecystectomy.     OBSTETRICAL HISTORY:  The patient is primigravida.     GYNECOLOGIC HISTORY:  Unremarkable.     SOCIAL HISTORY:  She does report cannabis usage intermittently during the   course of this pregnancy.  She denies any other drug use.     MEDICATIONS:  Prenatal vitamins, trazodone 50 mg tablets daily, Lexapro 20 mg   tablets daily.     ALLERGIES:  No known drug allergies.     PHYSICAL EXAMINATION:   VITAL SIGNS:  She is afebrile, hemodynamically stable.  Current vital signs   can be seen in electronic medical record.  HEART:  Regular rate and rhythm.  CHEST:  Clear to auscultation bilaterally.  ABDOMEN:  Soft, gravid, nontender.  PELVIC:  Sterile vaginal exam, 1, 60%, -3 station.  EXTREMITIES:  Nontender.     LABORATORY DATA:  Prenatal care labs are all in order.  She is group B strep   negative.     ASSESSMENT AND PLAN:  A 26-year-old  1, para 0 postdates electing to   proceed forward with postdates induction of labor with Pitocin per protocol.        ______________________________  MD BLANCA Burks/BERTHA/KUNAL    DD:  2022 13:43  DT:  2022 14:06    Job#:  [id number]

## 2022-02-13 ENCOUNTER — ANESTHESIA (OUTPATIENT)
Dept: OBGYN | Facility: MEDICAL CENTER | Age: 27
End: 2022-02-13
Payer: COMMERCIAL

## 2022-02-13 ENCOUNTER — HOSPITAL ENCOUNTER (INPATIENT)
Facility: MEDICAL CENTER | Age: 27
LOS: 3 days | End: 2022-02-16
Attending: SPECIALIST | Admitting: SPECIALIST
Payer: COMMERCIAL

## 2022-02-13 ENCOUNTER — ANESTHESIA EVENT (OUTPATIENT)
Dept: OBGYN | Facility: MEDICAL CENTER | Age: 27
End: 2022-02-13
Payer: COMMERCIAL

## 2022-02-13 LAB
BASOPHILS # BLD AUTO: 0.2 % (ref 0–1.8)
BASOPHILS # BLD: 0.02 K/UL (ref 0–0.12)
EOSINOPHIL # BLD AUTO: 0.16 K/UL (ref 0–0.51)
EOSINOPHIL NFR BLD: 1.5 % (ref 0–6.9)
ERYTHROCYTE [DISTWIDTH] IN BLOOD BY AUTOMATED COUNT: 46.5 FL (ref 35.9–50)
HCT VFR BLD AUTO: 37 % (ref 37–47)
HGB BLD-MCNC: 12.4 G/DL (ref 12–16)
HOLDING TUBE BB 8507: NORMAL
IMM GRANULOCYTES # BLD AUTO: 0.05 K/UL (ref 0–0.11)
IMM GRANULOCYTES NFR BLD AUTO: 0.5 % (ref 0–0.9)
LYMPHOCYTES # BLD AUTO: 2.01 K/UL (ref 1–4.8)
LYMPHOCYTES NFR BLD: 19.2 % (ref 22–41)
MCH RBC QN AUTO: 29.7 PG (ref 27–33)
MCHC RBC AUTO-ENTMCNC: 33.5 G/DL (ref 33.6–35)
MCV RBC AUTO: 88.5 FL (ref 81.4–97.8)
MONOCYTES # BLD AUTO: 1.06 K/UL (ref 0–0.85)
MONOCYTES NFR BLD AUTO: 10.1 % (ref 0–13.4)
NEUTROPHILS # BLD AUTO: 7.15 K/UL (ref 2–7.15)
NEUTROPHILS NFR BLD: 68.5 % (ref 44–72)
NRBC # BLD AUTO: 0 K/UL
NRBC BLD-RTO: 0 /100 WBC
PLATELET # BLD AUTO: 351 K/UL (ref 164–446)
PMV BLD AUTO: 8.8 FL (ref 9–12.9)
RBC # BLD AUTO: 4.18 M/UL (ref 4.2–5.4)
WBC # BLD AUTO: 10.5 K/UL (ref 4.8–10.8)

## 2022-02-13 PROCEDURE — 770002 HCHG ROOM/CARE - OB PRIVATE (112)

## 2022-02-13 PROCEDURE — 700111 HCHG RX REV CODE 636 W/ 250 OVERRIDE (IP): Performed by: SPECIALIST

## 2022-02-13 PROCEDURE — 85025 COMPLETE CBC W/AUTO DIFF WBC: CPT

## 2022-02-13 PROCEDURE — 700111 HCHG RX REV CODE 636 W/ 250 OVERRIDE (IP)

## 2022-02-13 PROCEDURE — 700111 HCHG RX REV CODE 636 W/ 250 OVERRIDE (IP): Performed by: ANESTHESIOLOGY

## 2022-02-13 PROCEDURE — 303615 HCHG EPIDURAL/SPINAL ANESTHESIA FOR LABOR

## 2022-02-13 PROCEDURE — 87426 SARSCOV CORONAVIRUS AG IA: CPT

## 2022-02-13 RX ORDER — SODIUM CHLORIDE, SODIUM LACTATE, POTASSIUM CHLORIDE, AND CALCIUM CHLORIDE .6; .31; .03; .02 G/100ML; G/100ML; G/100ML; G/100ML
250 INJECTION, SOLUTION INTRAVENOUS PRN
Status: DISCONTINUED | OUTPATIENT
Start: 2022-02-13 | End: 2022-02-14 | Stop reason: HOSPADM

## 2022-02-13 RX ORDER — ROPIVACAINE HYDROCHLORIDE 2 MG/ML
INJECTION, SOLUTION EPIDURAL; INFILTRATION; PERINEURAL CONTINUOUS
Status: DISCONTINUED | OUTPATIENT
Start: 2022-02-13 | End: 2022-02-14

## 2022-02-13 RX ORDER — BUPIVACAINE HYDROCHLORIDE 2.5 MG/ML
INJECTION, SOLUTION EPIDURAL; INFILTRATION; INTRACAUDAL PRN
Status: DISCONTINUED | OUTPATIENT
Start: 2022-02-13 | End: 2022-02-14 | Stop reason: SURG

## 2022-02-13 RX ORDER — OXYTOCIN 10 [USP'U]/ML
10 INJECTION, SOLUTION INTRAMUSCULAR; INTRAVENOUS
Status: DISCONTINUED | OUTPATIENT
Start: 2022-02-13 | End: 2022-02-14 | Stop reason: HOSPADM

## 2022-02-13 RX ORDER — ACETAMINOPHEN 500 MG
1000 TABLET ORAL
Status: DISCONTINUED | OUTPATIENT
Start: 2022-02-13 | End: 2022-02-14 | Stop reason: HOSPADM

## 2022-02-13 RX ORDER — TERBUTALINE SULFATE 1 MG/ML
0.25 INJECTION, SOLUTION SUBCUTANEOUS
Status: DISCONTINUED | OUTPATIENT
Start: 2022-02-13 | End: 2022-02-14 | Stop reason: HOSPADM

## 2022-02-13 RX ORDER — IBUPROFEN 800 MG/1
800 TABLET ORAL
Status: DISCONTINUED | OUTPATIENT
Start: 2022-02-13 | End: 2022-02-14 | Stop reason: HOSPADM

## 2022-02-13 RX ORDER — SODIUM CHLORIDE, SODIUM LACTATE, POTASSIUM CHLORIDE, CALCIUM CHLORIDE 600; 310; 30; 20 MG/100ML; MG/100ML; MG/100ML; MG/100ML
INJECTION, SOLUTION INTRAVENOUS CONTINUOUS
Status: DISCONTINUED | OUTPATIENT
Start: 2022-02-13 | End: 2022-02-14

## 2022-02-13 RX ORDER — ROPIVACAINE HYDROCHLORIDE 2 MG/ML
INJECTION, SOLUTION EPIDURAL; INFILTRATION; PERINEURAL
Status: COMPLETED
Start: 2022-02-13 | End: 2022-02-13

## 2022-02-13 RX ORDER — BUPIVACAINE HYDROCHLORIDE 2.5 MG/ML
INJECTION, SOLUTION EPIDURAL; INFILTRATION; INTRACAUDAL
Status: COMPLETED
Start: 2022-02-13 | End: 2022-02-13

## 2022-02-13 RX ORDER — SODIUM CHLORIDE, SODIUM LACTATE, POTASSIUM CHLORIDE, AND CALCIUM CHLORIDE .6; .31; .03; .02 G/100ML; G/100ML; G/100ML; G/100ML
1000 INJECTION, SOLUTION INTRAVENOUS
Status: DISCONTINUED | OUTPATIENT
Start: 2022-02-13 | End: 2022-02-14 | Stop reason: HOSPADM

## 2022-02-13 RX ADMIN — BUPIVACAINE HYDROCHLORIDE 3 ML: 2.5 INJECTION, SOLUTION EPIDURAL; INFILTRATION; INTRACAUDAL; PERINEURAL at 21:48

## 2022-02-13 RX ADMIN — BUPIVACAINE HYDROCHLORIDE 3 ML: 2.5 INJECTION, SOLUTION EPIDURAL; INFILTRATION; INTRACAUDAL; PERINEURAL at 21:53

## 2022-02-13 RX ADMIN — ROPIVACAINE HYDROCHLORIDE 200 MG: 2 INJECTION, SOLUTION EPIDURAL; INFILTRATION at 21:51

## 2022-02-13 RX ADMIN — FENTANYL CITRATE 50 MCG: 50 INJECTION, SOLUTION INTRAMUSCULAR; INTRAVENOUS at 21:48

## 2022-02-13 RX ADMIN — Medication 2000 ML/HR: at 23:00

## 2022-02-13 RX ADMIN — FENTANYL CITRATE 50 MCG: 50 INJECTION, SOLUTION INTRAMUSCULAR; INTRAVENOUS at 21:53

## 2022-02-13 RX ADMIN — ROPIVACAINE HYDROCHLORIDE 200 MG: 2 INJECTION, SOLUTION EPIDURAL; INFILTRATION; PERINEURAL at 21:51

## 2022-02-13 ASSESSMENT — FIBROSIS 4 INDEX: FIB4 SCORE: 0.39

## 2022-02-14 LAB
ERYTHROCYTE [DISTWIDTH] IN BLOOD BY AUTOMATED COUNT: 47.8 FL (ref 35.9–50)
HCT VFR BLD AUTO: 33.1 % (ref 37–47)
HGB BLD-MCNC: 11.1 G/DL (ref 12–16)
MCH RBC QN AUTO: 30.2 PG (ref 27–33)
MCHC RBC AUTO-ENTMCNC: 33.5 G/DL (ref 33.6–35)
MCV RBC AUTO: 90.2 FL (ref 81.4–97.8)
PLATELET # BLD AUTO: 311 K/UL (ref 164–446)
PMV BLD AUTO: 8.9 FL (ref 9–12.9)
RBC # BLD AUTO: 3.67 M/UL (ref 4.2–5.4)
SARS-COV+SARS-COV-2 AG RESP QL IA.RAPID: NOTDETECTED
SPECIMEN SOURCE: NORMAL
WBC # BLD AUTO: 22.2 K/UL (ref 4.8–10.8)

## 2022-02-14 PROCEDURE — 160009 HCHG ANES TIME/MIN: Performed by: SPECIALIST

## 2022-02-14 PROCEDURE — 160029 HCHG SURGERY MINUTES - 1ST 30 MINS LEVEL 4: Performed by: SPECIALIST

## 2022-02-14 PROCEDURE — 700111 HCHG RX REV CODE 636 W/ 250 OVERRIDE (IP): Performed by: ANESTHESIOLOGY

## 2022-02-14 PROCEDURE — 700111 HCHG RX REV CODE 636 W/ 250 OVERRIDE (IP)

## 2022-02-14 PROCEDURE — 160035 HCHG PACU - 1ST 60 MINS PHASE I: Performed by: SPECIALIST

## 2022-02-14 PROCEDURE — 700102 HCHG RX REV CODE 250 W/ 637 OVERRIDE(OP): Performed by: SPECIALIST

## 2022-02-14 PROCEDURE — 160048 HCHG OR STATISTICAL LEVEL 1-5: Performed by: SPECIALIST

## 2022-02-14 PROCEDURE — 700102 HCHG RX REV CODE 250 W/ 637 OVERRIDE(OP): Performed by: ANESTHESIOLOGY

## 2022-02-14 PROCEDURE — A9270 NON-COVERED ITEM OR SERVICE: HCPCS

## 2022-02-14 PROCEDURE — 36415 COLL VENOUS BLD VENIPUNCTURE: CPT

## 2022-02-14 PROCEDURE — 700105 HCHG RX REV CODE 258: Performed by: ANESTHESIOLOGY

## 2022-02-14 PROCEDURE — A9270 NON-COVERED ITEM OR SERVICE: HCPCS | Performed by: SPECIALIST

## 2022-02-14 PROCEDURE — 700105 HCHG RX REV CODE 258: Performed by: SPECIALIST

## 2022-02-14 PROCEDURE — A9270 NON-COVERED ITEM OR SERVICE: HCPCS | Performed by: ANESTHESIOLOGY

## 2022-02-14 PROCEDURE — 700101 HCHG RX REV CODE 250: Performed by: ANESTHESIOLOGY

## 2022-02-14 PROCEDURE — 160002 HCHG RECOVERY MINUTES (STAT): Performed by: SPECIALIST

## 2022-02-14 PROCEDURE — 160041 HCHG SURGERY MINUTES - EA ADDL 1 MIN LEVEL 4: Performed by: SPECIALIST

## 2022-02-14 PROCEDURE — 700102 HCHG RX REV CODE 250 W/ 637 OVERRIDE(OP)

## 2022-02-14 PROCEDURE — 85027 COMPLETE CBC AUTOMATED: CPT

## 2022-02-14 PROCEDURE — 700111 HCHG RX REV CODE 636 W/ 250 OVERRIDE (IP): Performed by: SPECIALIST

## 2022-02-14 PROCEDURE — 770002 HCHG ROOM/CARE - OB PRIVATE (112)

## 2022-02-14 RX ORDER — DEXAMETHASONE SODIUM PHOSPHATE 4 MG/ML
INJECTION, SOLUTION INTRA-ARTICULAR; INTRALESIONAL; INTRAMUSCULAR; INTRAVENOUS; SOFT TISSUE PRN
Status: DISCONTINUED | OUTPATIENT
Start: 2022-02-14 | End: 2022-02-14 | Stop reason: SURG

## 2022-02-14 RX ORDER — OXYTOCIN 10 [USP'U]/ML
10 INJECTION, SOLUTION INTRAMUSCULAR; INTRAVENOUS
Status: DISCONTINUED | OUTPATIENT
Start: 2022-02-14 | End: 2022-02-16 | Stop reason: HOSPADM

## 2022-02-14 RX ORDER — OXYCODONE HYDROCHLORIDE 10 MG/1
10 TABLET ORAL EVERY 4 HOURS PRN
Status: ACTIVE | OUTPATIENT
Start: 2022-02-14 | End: 2022-02-15

## 2022-02-14 RX ORDER — MEPERIDINE HYDROCHLORIDE 25 MG/ML
12.5 INJECTION INTRAMUSCULAR; INTRAVENOUS; SUBCUTANEOUS
Status: DISCONTINUED | OUTPATIENT
Start: 2022-02-14 | End: 2022-02-14

## 2022-02-14 RX ORDER — BISACODYL 10 MG
10 SUPPOSITORY, RECTAL RECTAL PRN
Status: DISCONTINUED | OUTPATIENT
Start: 2022-02-14 | End: 2022-02-16 | Stop reason: HOSPADM

## 2022-02-14 RX ORDER — KETOROLAC TROMETHAMINE 30 MG/ML
INJECTION, SOLUTION INTRAMUSCULAR; INTRAVENOUS PRN
Status: DISCONTINUED | OUTPATIENT
Start: 2022-02-14 | End: 2022-02-14 | Stop reason: SURG

## 2022-02-14 RX ORDER — OXYCODONE HCL 5 MG/5 ML
5 SOLUTION, ORAL ORAL
Status: DISCONTINUED | OUTPATIENT
Start: 2022-02-14 | End: 2022-02-14

## 2022-02-14 RX ORDER — VITAMIN A ACETATE, BETA CAROTENE, ASCORBIC ACID, CHOLECALCIFEROL, .ALPHA.-TOCOPHEROL ACETATE, DL-, THIAMINE MONONITRATE, RIBOFLAVIN, NIACINAMIDE, PYRIDOXINE HYDROCHLORIDE, FOLIC ACID, CYANOCOBALAMIN, CALCIUM CARBONATE, FERROUS FUMARATE, ZINC OXIDE, CUPRIC OXIDE 3080; 12; 120; 400; 1; 1.84; 3; 20; 22; 920; 25; 200; 27; 10; 2 [IU]/1; UG/1; MG/1; [IU]/1; MG/1; MG/1; MG/1; MG/1; MG/1; [IU]/1; MG/1; MG/1; MG/1; MG/1; MG/1
1 TABLET, FILM COATED ORAL
Status: DISCONTINUED | OUTPATIENT
Start: 2022-02-14 | End: 2022-02-16 | Stop reason: HOSPADM

## 2022-02-14 RX ORDER — HYDROMORPHONE HYDROCHLORIDE 1 MG/ML
0.4 INJECTION, SOLUTION INTRAMUSCULAR; INTRAVENOUS; SUBCUTANEOUS
Status: DISCONTINUED | OUTPATIENT
Start: 2022-02-14 | End: 2022-02-14

## 2022-02-14 RX ORDER — KETOROLAC TROMETHAMINE 30 MG/ML
30 INJECTION, SOLUTION INTRAMUSCULAR; INTRAVENOUS EVERY 6 HOURS
Status: DISPENSED | OUTPATIENT
Start: 2022-02-14 | End: 2022-02-15

## 2022-02-14 RX ORDER — OXYCODONE HYDROCHLORIDE 10 MG/1
10 TABLET ORAL EVERY 4 HOURS PRN
Status: DISCONTINUED | OUTPATIENT
Start: 2022-02-15 | End: 2022-02-16 | Stop reason: HOSPADM

## 2022-02-14 RX ORDER — ACETAMINOPHEN 500 MG
1000 TABLET ORAL EVERY 6 HOURS
Status: DISCONTINUED | OUTPATIENT
Start: 2022-02-14 | End: 2022-02-14

## 2022-02-14 RX ORDER — MORPHINE SULFATE 0.5 MG/ML
INJECTION, SOLUTION EPIDURAL; INTRATHECAL; INTRAVENOUS PRN
Status: DISCONTINUED | OUTPATIENT
Start: 2022-02-14 | End: 2022-02-14 | Stop reason: SURG

## 2022-02-14 RX ORDER — ONDANSETRON 2 MG/ML
4 INJECTION INTRAMUSCULAR; INTRAVENOUS EVERY 6 HOURS PRN
Status: DISCONTINUED | OUTPATIENT
Start: 2022-02-14 | End: 2022-02-14

## 2022-02-14 RX ORDER — SCOLOPAMINE TRANSDERMAL SYSTEM 1 MG/1
PATCH, EXTENDED RELEASE TRANSDERMAL PRN
Status: DISCONTINUED | OUTPATIENT
Start: 2022-02-14 | End: 2022-02-14 | Stop reason: SURG

## 2022-02-14 RX ORDER — CARBOPROST TROMETHAMINE 250 UG/ML
250 INJECTION, SOLUTION INTRAMUSCULAR
Status: DISCONTINUED | OUTPATIENT
Start: 2022-02-14 | End: 2022-02-16 | Stop reason: HOSPADM

## 2022-02-14 RX ORDER — ONDANSETRON 4 MG/1
4 TABLET, ORALLY DISINTEGRATING ORAL EVERY 6 HOURS PRN
Status: DISCONTINUED | OUTPATIENT
Start: 2022-02-15 | End: 2022-02-16 | Stop reason: HOSPADM

## 2022-02-14 RX ORDER — CITRIC ACID/SODIUM CITRATE 334-500MG
30 SOLUTION, ORAL ORAL ONCE
Status: COMPLETED | OUTPATIENT
Start: 2022-02-14 | End: 2022-02-14

## 2022-02-14 RX ORDER — PHENYLEPHRINE HCL IN 0.9% NACL 0.5 MG/5ML
SYRINGE (ML) INTRAVENOUS PRN
Status: DISCONTINUED | OUTPATIENT
Start: 2022-02-14 | End: 2022-02-14 | Stop reason: SURG

## 2022-02-14 RX ORDER — ESCITALOPRAM OXALATE 10 MG/1
20 TABLET ORAL NIGHTLY
Status: DISCONTINUED | OUTPATIENT
Start: 2022-02-14 | End: 2022-02-16 | Stop reason: HOSPADM

## 2022-02-14 RX ORDER — METOCLOPRAMIDE HYDROCHLORIDE 5 MG/ML
10 INJECTION INTRAMUSCULAR; INTRAVENOUS ONCE
Status: COMPLETED | OUTPATIENT
Start: 2022-02-14 | End: 2022-02-14

## 2022-02-14 RX ORDER — OXYCODONE HYDROCHLORIDE 5 MG/1
5 TABLET ORAL EVERY 4 HOURS PRN
Status: ACTIVE | OUTPATIENT
Start: 2022-02-14 | End: 2022-02-15

## 2022-02-14 RX ORDER — OXYCODONE HCL 5 MG/5 ML
10 SOLUTION, ORAL ORAL
Status: DISCONTINUED | OUTPATIENT
Start: 2022-02-14 | End: 2022-02-14

## 2022-02-14 RX ORDER — IBUPROFEN 800 MG/1
800 TABLET ORAL EVERY 8 HOURS PRN
Status: DISCONTINUED | OUTPATIENT
Start: 2022-02-18 | End: 2022-02-15

## 2022-02-14 RX ORDER — HYDROMORPHONE HYDROCHLORIDE 1 MG/ML
0.5 INJECTION, SOLUTION INTRAMUSCULAR; INTRAVENOUS; SUBCUTANEOUS
Status: DISCONTINUED | OUTPATIENT
Start: 2022-02-14 | End: 2022-02-14

## 2022-02-14 RX ORDER — ACETAMINOPHEN 500 MG
1000 TABLET ORAL EVERY 6 HOURS PRN
Status: DISCONTINUED | OUTPATIENT
Start: 2022-02-18 | End: 2022-02-16 | Stop reason: HOSPADM

## 2022-02-14 RX ORDER — DOCUSATE SODIUM 100 MG/1
100 CAPSULE, LIQUID FILLED ORAL 2 TIMES DAILY PRN
Status: DISCONTINUED | OUTPATIENT
Start: 2022-02-14 | End: 2022-02-16 | Stop reason: HOSPADM

## 2022-02-14 RX ORDER — SODIUM CHLORIDE, SODIUM GLUCONATE, SODIUM ACETATE, POTASSIUM CHLORIDE AND MAGNESIUM CHLORIDE 526; 502; 368; 37; 30 MG/100ML; MG/100ML; MG/100ML; MG/100ML; MG/100ML
INJECTION, SOLUTION INTRAVENOUS
Status: DISCONTINUED | OUTPATIENT
Start: 2022-02-14 | End: 2022-02-14 | Stop reason: SURG

## 2022-02-14 RX ORDER — HYDROMORPHONE HYDROCHLORIDE 1 MG/ML
0.2 INJECTION, SOLUTION INTRAMUSCULAR; INTRAVENOUS; SUBCUTANEOUS
Status: DISCONTINUED | OUTPATIENT
Start: 2022-02-14 | End: 2022-02-14

## 2022-02-14 RX ORDER — ACETAMINOPHEN 500 MG
1000 TABLET ORAL EVERY 6 HOURS
Status: COMPLETED | OUTPATIENT
Start: 2022-02-14 | End: 2022-02-14

## 2022-02-14 RX ORDER — ONDANSETRON 2 MG/ML
INJECTION INTRAMUSCULAR; INTRAVENOUS PRN
Status: DISCONTINUED | OUTPATIENT
Start: 2022-02-14 | End: 2022-02-14 | Stop reason: SURG

## 2022-02-14 RX ORDER — SODIUM CHLORIDE, SODIUM LACTATE, POTASSIUM CHLORIDE, CALCIUM CHLORIDE 600; 310; 30; 20 MG/100ML; MG/100ML; MG/100ML; MG/100ML
INJECTION, SOLUTION INTRAVENOUS CONTINUOUS
Status: DISCONTINUED | OUTPATIENT
Start: 2022-02-14 | End: 2022-02-16 | Stop reason: HOSPADM

## 2022-02-14 RX ORDER — MISOPROSTOL 200 UG/1
800 TABLET ORAL
Status: DISCONTINUED | OUTPATIENT
Start: 2022-02-14 | End: 2022-02-16 | Stop reason: HOSPADM

## 2022-02-14 RX ORDER — SODIUM CHLORIDE, SODIUM LACTATE, POTASSIUM CHLORIDE, CALCIUM CHLORIDE 600; 310; 30; 20 MG/100ML; MG/100ML; MG/100ML; MG/100ML
INJECTION, SOLUTION INTRAVENOUS CONTINUOUS
Status: DISCONTINUED | OUTPATIENT
Start: 2022-02-14 | End: 2022-02-14

## 2022-02-14 RX ORDER — HALOPERIDOL 5 MG/ML
1 INJECTION INTRAMUSCULAR
Status: DISCONTINUED | OUTPATIENT
Start: 2022-02-14 | End: 2022-02-14

## 2022-02-14 RX ORDER — CEFAZOLIN SODIUM 1 G/3ML
INJECTION, POWDER, FOR SOLUTION INTRAMUSCULAR; INTRAVENOUS PRN
Status: DISCONTINUED | OUTPATIENT
Start: 2022-02-14 | End: 2022-02-14 | Stop reason: SURG

## 2022-02-14 RX ORDER — DIPHENHYDRAMINE HYDROCHLORIDE 50 MG/ML
12.5 INJECTION INTRAMUSCULAR; INTRAVENOUS EVERY 6 HOURS PRN
Status: ACTIVE | OUTPATIENT
Start: 2022-02-14 | End: 2022-02-15

## 2022-02-14 RX ORDER — METOCLOPRAMIDE HYDROCHLORIDE 5 MG/ML
10 INJECTION INTRAMUSCULAR; INTRAVENOUS EVERY 6 HOURS PRN
Status: DISCONTINUED | OUTPATIENT
Start: 2022-02-15 | End: 2022-02-16 | Stop reason: HOSPADM

## 2022-02-14 RX ORDER — OXYCODONE HYDROCHLORIDE 5 MG/1
5 TABLET ORAL EVERY 4 HOURS PRN
Status: DISCONTINUED | OUTPATIENT
Start: 2022-02-14 | End: 2022-02-14

## 2022-02-14 RX ORDER — SODIUM CHLORIDE, SODIUM LACTATE, POTASSIUM CHLORIDE, CALCIUM CHLORIDE 600; 310; 30; 20 MG/100ML; MG/100ML; MG/100ML; MG/100ML
INJECTION, SOLUTION INTRAVENOUS ONCE
Status: DISCONTINUED | OUTPATIENT
Start: 2022-02-14 | End: 2022-02-14

## 2022-02-14 RX ORDER — METOCLOPRAMIDE HYDROCHLORIDE 5 MG/ML
INJECTION INTRAMUSCULAR; INTRAVENOUS
Status: COMPLETED
Start: 2022-02-14 | End: 2022-02-14

## 2022-02-14 RX ORDER — DIPHENHYDRAMINE HCL 25 MG
25 TABLET ORAL EVERY 6 HOURS PRN
Status: DISCONTINUED | OUTPATIENT
Start: 2022-02-15 | End: 2022-02-16 | Stop reason: HOSPADM

## 2022-02-14 RX ORDER — OXYTOCIN 10 [USP'U]/ML
INJECTION, SOLUTION INTRAMUSCULAR; INTRAVENOUS PRN
Status: DISCONTINUED | OUTPATIENT
Start: 2022-02-14 | End: 2022-02-14 | Stop reason: SURG

## 2022-02-14 RX ORDER — LIDOCAINE HCL/EPINEPHRINE/PF 2%-1:200K
VIAL (ML) INJECTION PRN
Status: DISCONTINUED | OUTPATIENT
Start: 2022-02-14 | End: 2022-02-14 | Stop reason: SURG

## 2022-02-14 RX ORDER — DIPHENHYDRAMINE HYDROCHLORIDE 50 MG/ML
25 INJECTION INTRAMUSCULAR; INTRAVENOUS EVERY 6 HOURS PRN
Status: ACTIVE | OUTPATIENT
Start: 2022-02-14 | End: 2022-02-15

## 2022-02-14 RX ORDER — DIPHENHYDRAMINE HYDROCHLORIDE 50 MG/ML
12.5 INJECTION INTRAMUSCULAR; INTRAVENOUS
Status: DISCONTINUED | OUTPATIENT
Start: 2022-02-14 | End: 2022-02-14

## 2022-02-14 RX ORDER — IBUPROFEN 800 MG/1
800 TABLET ORAL EVERY 8 HOURS
Status: DISCONTINUED | OUTPATIENT
Start: 2022-02-15 | End: 2022-02-15

## 2022-02-14 RX ORDER — AZITHROMYCIN 500 MG/5ML
INJECTION, POWDER, LYOPHILIZED, FOR SOLUTION INTRAVENOUS
Status: DISCONTINUED
Start: 2022-02-14 | End: 2022-02-14

## 2022-02-14 RX ORDER — SODIUM CHLORIDE, SODIUM LACTATE, POTASSIUM CHLORIDE, CALCIUM CHLORIDE 600; 310; 30; 20 MG/100ML; MG/100ML; MG/100ML; MG/100ML
INJECTION, SOLUTION INTRAVENOUS PRN
Status: DISCONTINUED | OUTPATIENT
Start: 2022-02-14 | End: 2022-02-16 | Stop reason: HOSPADM

## 2022-02-14 RX ORDER — ACETAMINOPHEN 500 MG
1000 TABLET ORAL EVERY 6 HOURS
Status: DISCONTINUED | OUTPATIENT
Start: 2022-02-15 | End: 2022-02-16 | Stop reason: HOSPADM

## 2022-02-14 RX ORDER — SODIUM CHLORIDE, SODIUM GLUCONATE, SODIUM ACETATE, POTASSIUM CHLORIDE AND MAGNESIUM CHLORIDE 526; 502; 368; 37; 30 MG/100ML; MG/100ML; MG/100ML; MG/100ML; MG/100ML
1000 INJECTION, SOLUTION INTRAVENOUS ONCE
Status: DISCONTINUED | OUTPATIENT
Start: 2022-02-14 | End: 2022-02-14 | Stop reason: HOSPADM

## 2022-02-14 RX ORDER — ONDANSETRON 2 MG/ML
4 INJECTION INTRAMUSCULAR; INTRAVENOUS
Status: DISCONTINUED | OUTPATIENT
Start: 2022-02-14 | End: 2022-02-14

## 2022-02-14 RX ORDER — DIPHENHYDRAMINE HYDROCHLORIDE 50 MG/ML
25 INJECTION INTRAMUSCULAR; INTRAVENOUS EVERY 6 HOURS PRN
Status: DISCONTINUED | OUTPATIENT
Start: 2022-02-15 | End: 2022-02-16 | Stop reason: HOSPADM

## 2022-02-14 RX ORDER — OXYCODONE HYDROCHLORIDE 5 MG/1
5 TABLET ORAL EVERY 4 HOURS PRN
Status: DISCONTINUED | OUTPATIENT
Start: 2022-02-15 | End: 2022-02-16 | Stop reason: HOSPADM

## 2022-02-14 RX ORDER — ONDANSETRON 2 MG/ML
4 INJECTION INTRAMUSCULAR; INTRAVENOUS EVERY 6 HOURS PRN
Status: DISCONTINUED | OUTPATIENT
Start: 2022-02-15 | End: 2022-02-16 | Stop reason: HOSPADM

## 2022-02-14 RX ORDER — CITRIC ACID/SODIUM CITRATE 334-500MG
SOLUTION, ORAL ORAL
Status: COMPLETED
Start: 2022-02-14 | End: 2022-02-14

## 2022-02-14 RX ORDER — OXYCODONE HYDROCHLORIDE 10 MG/1
10 TABLET ORAL EVERY 4 HOURS PRN
Status: DISCONTINUED | OUTPATIENT
Start: 2022-02-14 | End: 2022-02-14

## 2022-02-14 RX ORDER — HYDRALAZINE HYDROCHLORIDE 20 MG/ML
5 INJECTION INTRAMUSCULAR; INTRAVENOUS
Status: DISCONTINUED | OUTPATIENT
Start: 2022-02-14 | End: 2022-02-14

## 2022-02-14 RX ORDER — METHYLERGONOVINE MALEATE 0.2 MG/ML
0.2 INJECTION INTRAVENOUS
Status: DISCONTINUED | OUTPATIENT
Start: 2022-02-14 | End: 2022-02-16 | Stop reason: HOSPADM

## 2022-02-14 RX ORDER — DIPHENHYDRAMINE HYDROCHLORIDE 50 MG/ML
12.5 INJECTION INTRAMUSCULAR; INTRAVENOUS EVERY 6 HOURS PRN
Status: DISCONTINUED | OUTPATIENT
Start: 2022-02-14 | End: 2022-02-16 | Stop reason: HOSPADM

## 2022-02-14 RX ADMIN — LIDOCAINE HYDROCHLORIDE AND EPINEPHRINE 8 ML: 20; 5 INJECTION, SOLUTION EPIDURAL; INFILTRATION; INTRACAUDAL; PERINEURAL at 01:42

## 2022-02-14 RX ADMIN — CEFAZOLIN 2 G: 330 INJECTION, POWDER, FOR SOLUTION INTRAMUSCULAR; INTRAVENOUS at 01:41

## 2022-02-14 RX ADMIN — Medication 100 MCG: at 01:53

## 2022-02-14 RX ADMIN — Medication 100 MCG: at 02:10

## 2022-02-14 RX ADMIN — METOCLOPRAMIDE HYDROCHLORIDE 10 MG: 5 INJECTION INTRAMUSCULAR; INTRAVENOUS at 01:34

## 2022-02-14 RX ADMIN — PRENATAL WITH FERROUS FUM AND FOLIC ACID 1 TABLET: 3080; 920; 120; 400; 22; 1.84; 3; 20; 10; 1; 12; 200; 27; 25; 2 TABLET ORAL at 08:53

## 2022-02-14 RX ADMIN — SCOPALAMINE 1 PATCH: 1 PATCH, EXTENDED RELEASE TRANSDERMAL at 02:05

## 2022-02-14 RX ADMIN — KETOROLAC TROMETHAMINE 30 MG: 30 INJECTION, SOLUTION INTRAMUSCULAR at 22:20

## 2022-02-14 RX ADMIN — ESCITALOPRAM OXALATE 20 MG: 10 TABLET ORAL at 22:20

## 2022-02-14 RX ADMIN — MORPHINE SULFATE 1.5 MG: 0.5 INJECTION, SOLUTION EPIDURAL; INTRATHECAL; INTRAVENOUS at 02:03

## 2022-02-14 RX ADMIN — Medication 30 ML: at 01:34

## 2022-02-14 RX ADMIN — KETOROLAC TROMETHAMINE 30 MG: 30 INJECTION, SOLUTION INTRAMUSCULAR at 15:56

## 2022-02-14 RX ADMIN — SODIUM CHLORIDE, POTASSIUM CHLORIDE, SODIUM LACTATE AND CALCIUM CHLORIDE: 600; 310; 30; 20 INJECTION, SOLUTION INTRAVENOUS at 11:21

## 2022-02-14 RX ADMIN — AZITHROMYCIN MONOHYDRATE 500 MG: 500 INJECTION, POWDER, LYOPHILIZED, FOR SOLUTION INTRAVENOUS at 01:41

## 2022-02-14 RX ADMIN — SODIUM CHLORIDE, SODIUM GLUCONATE, SODIUM ACETATE, POTASSIUM CHLORIDE AND MAGNESIUM CHLORIDE: 526; 502; 368; 37; 30 INJECTION, SOLUTION INTRAVENOUS at 01:41

## 2022-02-14 RX ADMIN — LIDOCAINE HYDROCHLORIDE AND EPINEPHRINE 10 ML: 20; 5 INJECTION, SOLUTION EPIDURAL; INFILTRATION; INTRACAUDAL; PERINEURAL at 01:38

## 2022-02-14 RX ADMIN — OXYTOCIN 20 UNITS: 10 INJECTION, SOLUTION INTRAMUSCULAR; INTRAVENOUS at 02:00

## 2022-02-14 RX ADMIN — FENTANYL CITRATE 100 MCG: 50 INJECTION, SOLUTION INTRAMUSCULAR; INTRAVENOUS at 01:41

## 2022-02-14 RX ADMIN — ONDANSETRON 4 MG: 2 INJECTION INTRAMUSCULAR; INTRAVENOUS at 02:05

## 2022-02-14 RX ADMIN — METOCLOPRAMIDE 10 MG: 5 INJECTION, SOLUTION INTRAMUSCULAR; INTRAVENOUS at 01:34

## 2022-02-14 RX ADMIN — OXYTOCIN 2 MILLI-UNITS/MIN: 10 INJECTION, SOLUTION INTRAMUSCULAR; INTRAVENOUS at 00:58

## 2022-02-14 RX ADMIN — ACETAMINOPHEN 1000 MG: 500 TABLET ORAL at 06:18

## 2022-02-14 RX ADMIN — KETOROLAC TROMETHAMINE 30 MG: 30 INJECTION, SOLUTION INTRAMUSCULAR at 02:15

## 2022-02-14 RX ADMIN — OXYTOCIN 125 ML/HR: 10 INJECTION, SOLUTION INTRAMUSCULAR; INTRAVENOUS at 03:23

## 2022-02-14 RX ADMIN — FAMOTIDINE 20 MG: 10 INJECTION, SOLUTION INTRAVENOUS at 01:34

## 2022-02-14 RX ADMIN — KETOROLAC TROMETHAMINE 30 MG: 30 INJECTION, SOLUTION INTRAMUSCULAR at 08:53

## 2022-02-14 RX ADMIN — OXYTOCIN 1000 ML: 10 INJECTION, SOLUTION INTRAMUSCULAR; INTRAVENOUS at 02:22

## 2022-02-14 RX ADMIN — DEXAMETHASONE SODIUM PHOSPHATE 8 MG: 4 INJECTION, SOLUTION INTRA-ARTICULAR; INTRALESIONAL; INTRAMUSCULAR; INTRAVENOUS; SOFT TISSUE at 02:05

## 2022-02-14 RX ADMIN — Medication 3 ML: at 01:38

## 2022-02-14 RX ADMIN — SODIUM CITRATE AND CITRIC ACID MONOHYDRATE 30 ML: 500; 334 SOLUTION ORAL at 01:34

## 2022-02-14 RX ADMIN — ACETAMINOPHEN 1000 MG: 500 TABLET ORAL at 13:08

## 2022-02-14 RX ADMIN — ACETAMINOPHEN 1000 MG: 500 TABLET ORAL at 19:20

## 2022-02-14 RX ADMIN — Medication 100 MCG: at 02:11

## 2022-02-14 RX ADMIN — DOCUSATE SODIUM 100 MG: 100 CAPSULE, LIQUID FILLED ORAL at 08:53

## 2022-02-14 ASSESSMENT — PAIN DESCRIPTION - PAIN TYPE
TYPE: ACUTE PAIN;SURGICAL PAIN
TYPE: ACUTE PAIN
TYPE: ACUTE PAIN;SURGICAL PAIN

## 2022-02-14 ASSESSMENT — PAIN SCALES - GENERAL: PAIN_LEVEL: 1

## 2022-02-14 ASSESSMENT — LIFESTYLE VARIABLES: EVER_SMOKED: YES

## 2022-02-14 NOTE — PROGRESS NOTES
Call placed to Dr. Baldwin and informed him of pt -116 this morning. Order received to hang LR at 125 mL/hr once pt is done with her second bag of pitocin; and to call if pt is hypotensive or develops a fever. No parameters given for HR at this time. This has been communicated to Evelia SAXENA. Will place a nursing communication.

## 2022-02-14 NOTE — CARE PLAN
The patient is Stable - Low risk of patient condition declining or worsening    Shift Goals  Clinical Goals: maintain tolerable pain level    Progress made toward(s) clinical / shift goals:  pt pain has been a 1. Reviewed 0-10 pain scale and available pain medication available to the pt.     Patient is not progressing towards the following goals:

## 2022-02-14 NOTE — PROGRESS NOTES
Received patient from labor and delivery via gurney with Kayleigh RN. Second bag of pitocin is currently infusing. Assessment done. Fundus firm. Lochia scant to light. Instructed patient to increase fluid intake. Patient denies pain at this time. Pt is breastfeeding. Baby showing cues. Assisted pt with latching. Baby latched well using football hold on the right breast. Pt comfortable with this position. Call light within reach. Advised pt to call for needs at any time.

## 2022-02-14 NOTE — PROGRESS NOTES
2045 Report received from Jesica SAXENA. Pt transferred from triage to S213.  2145 Vinod at bedside. Epidural placed. Pt tolerated.  0150 Called charge RN to review strip.  1242 Dr. Chacon called to review strip   0115 Dr. Baldwin at bedside. Test push with pt.   0124 C section called  0420 Pt transferred to postpartum via gurney with infant in arms, Report given to Elina SAXENA.

## 2022-02-14 NOTE — ANESTHESIA POSTPROCEDURE EVALUATION
Patient: Suleam Bull    Procedure Summary     Date: 22 Room / Location: [facility] OR 02 / SURGERY LABOR AND DELIVERY    Anesthesia Start:  Anesthesia Stop: 22    Procedure:  SECTION, PRIMARY (Bilateral Abdomen) Diagnosis:       Labor complication      (Primary  Section Delivered)    Surgeons: Brendan Baldwin M.D. Responsible Provider: Dago Brock M.D.    Anesthesia Type: epidural ASA Status: 2          Final Anesthesia Type: epidural  Last vitals  BP   Blood Pressure: 136/94    Temp   36.4 °C (97.5 °F)    Pulse   (!) 110   Resp   20    SpO2   100 %      Anesthesia Post Evaluation    Patient location during evaluation: PACU  Patient participation: complete - patient participated  Level of consciousness: awake and alert  Pain score: 1    Airway patency: patent  Anesthetic complications: no  Cardiovascular status: hemodynamically stable  Respiratory status: acceptable  Hydration status: euvolemic    PONV: none          No complications documented.     Nurse Pain Score: 1 (NPRS)

## 2022-02-14 NOTE — LACTATION NOTE
This note was copied from a baby's chart.  Mother reports that she is breastfeeding her , baby girl Kisha without difficulty or discomfort. I reviewed the new parent education pamphlet in their orange bag with them and encouraged them to ask for assistance if needed.

## 2022-02-14 NOTE — PROGRESS NOTES
Pt ambulated to bathroom and perineal care performed. Bedding completely changed. Pt tolerated well and ambulates with a steady gait.

## 2022-02-14 NOTE — ANESTHESIA TIME REPORT
Anesthesia Start and Stop Event Times     Date Time Event    2022 Ready for Procedure      Anesthesia Start    2022 Anesthesia Stop        Responsible Staff  22 to 22    Name Role Begin End    Dago Brock M.D. Anesth 2143        Preop Diagnosis (Free Text):  Pre-op Diagnosis     39.6 fetal intolerance to labor      Long pregnancy at 40 weeks gestation, labor pain;  for failure to descend and fetal intolerance to labor    Preop Diagnosis (Codes):  Diagnosis Information     Diagnosis Code(s): Labor complication [O75.9]        Premium Reason  E. Weekend    Comments: Labor Epidural time: ;  time:

## 2022-02-14 NOTE — ANESTHESIA PREPROCEDURE EVALUATION
Date: 22  Procedure: Labor Epidural        pearson pregnancy at 40 weeks gestation. Additional history below. No significant pregnancy complications.    Relevant Problems   NEURO   (positive) History of self-harm   (positive) Intractable migraine without status migrainosus      CARDIAC   (positive) Intractable migraine without status migrainosus       Physical Exam    Airway   Mallampati: II  TM distance: >3 FB  Neck ROM: full       Cardiovascular - normal exam  Rhythm: regular  Rate: normal  (-) murmur     Dental - normal exam           Pulmonary - normal exam  Breath sounds clear to auscultation     Abdominal    Neurological - normal exam                 Anesthesia Plan    ASA 2       Plan - epidural   Neuraxial block will be labor analgesia                  Pertinent diagnostic labs and testing reviewed    Informed Consent:    Anesthetic plan and risks discussed with patient.

## 2022-02-14 NOTE — PROGRESS NOTES
2000 - Pt comes in with c/o contractions since this morning, intensifying at 1700. Pt denies any LOF or bleeding, +FM. EFM connected.   2016 - SVE 5/80/-1.   2022 - MD Chacon called and given report, orders for admission obtained.   2039 - Report given to Kayleigh BEAVERS RN.

## 2022-02-14 NOTE — ANESTHESIA PROCEDURE NOTES
Epidural Block    Date/Time: 2/13/2022 9:43 PM  Performed by: Dago Brock M.D.  Authorized by: Dago Brock M.D.     Patient Location:  OB  Start Time:  2/13/2022 9:43 PM  End Time:  2/13/2022 9:48 PM  Reason for Block: labor analgesia    patient identified, IV checked, site marked, risks and benefits discussed, surgical consent, monitors and equipment checked, pre-op evaluation and timeout performed    Patient Position:  Sitting  Prep: ChloraPrep, patient draped and sterile technique    Monitoring:  Blood pressure, continuous pulse oximetry and heart rate  Approach:  Midline  Location:  L3-L4  Injection Technique:  LOUIS saline  Skin infiltration:  Lidocaine  Strength:  1%  Dose:  3ml  Needle Type:  Tuohy  Needle Gauge:  17 G  Needle Length:  3.5 in  Loss of resistance::  5  Catheter Size:  19 G  Catheter at Skin Depth:  11  Test Dose Result:  Negative   Success on 1st pass  No heme/CSF  Catheter threaded easily  Negative aspiration  No evidence of complications  Patient comfortable after loading dose

## 2022-02-14 NOTE — OR SURGEON
Immediate Post OP Note    PreOp Diagnosis: Intrauterine pregnancy at term, fetal intolerance of labor, failure to descend.      PostOp Diagnosis: Same; delivered      Procedure(s):   SECTION, PRIMARY    Surgeon(s):  Brendan Baldwin M.D.    Assistant:   Dr. Kelly MD    Anesthesiologist/Type of Anesthesia:  Anesthesiologist: Dago Brock M.D./Spinal    Specimens removed if any:  None    Estimated Blood Loss: 700 ccs    Findings: Normal appearing uterus, fallopian tubes and ovaries, vigorous female infant with Apgars of 7/9 at one and five minutes respectively, placenta delivered spontaneous and intact with 3vc.    Complications: None        2022 2:27 AM Brendan Baldwin M.D.

## 2022-02-14 NOTE — OP REPORT
DATE OF SERVICE:  2022     PREOPERATIVE DIAGNOSES:  Intrauterine pregnancy at term, fetal intolerance of labor, failure to descend.     POSTOPERATIVE DIAGNOSES: Same; delivered     PROCEDURE PERFORMED:  Primary low transverse  section.     SURGEON:  Brendan Baldwin MD     ASSISTANT:  Dr. Kelly MD     ANESTHESIA:  Spinal     ANESTHESIOLOGIST:  Anesthesiologist: Dago Brock M.D.     ESTIMATED BLOOD LOSS FOR THE PROCEDURE: 700 mL.     FINDINGS:  Normal uterus, tubes, ovaries.  Vigorous  female infant with Apgars of 7 and 9  at 1 and 5 minutes respectively.  Placenta intact with 3-vessel cord.     DESCRIPTION OF PROCEDURE:  The patient was taken to the operating room where    spinal anesthetic was placed without difficulty.  The patient was then prepped  and draped in the usual sterile fashion.  A Pfannenstiel skin incision was    made and carried down to the level of the rectus fascia.  Rectus fascia was nicked in the midline.  Fascial incision was extended laterally in both directions with the use of Muniz    scissors.  Kocher clamps were placed on the inferior and superior edge of the    rectus fascia, which was used to dissect away from the underlying rectus    muscles.  Rectus muscles were  in the midline.  Peritoneum was    entered sharply.  Peritoneal incision was extended superior and inferior    carefully avoiding the bladder.  Bladder blade was placed.  Vesicouterine    peritoneum was grasped, incised, the bladder flap was created.  Uterine    incision was made with the use of scalpel and extended laterally in both    directions.  Clear amniotic fluid was noted upon entry into the uterine    cavity.  With gentle fundal pressure, the infant's head was delivered followed by easy delivery of the shoulders and body.  Cord was clamped and cut.  Infant was handed to waiting nursing staff. Apgars were 7 and 9 at 1 and 5 minutes respectively.  Placenta was then  delivered intact  with 3-vessel cord.  The uterus was then brought into maternal abdomen, wrapped in a wet lap sponge, dry gauze curetted.  Uterine incision was reapproximated with #1 chromic in a running interlocking fashion    in one segment.  Excellent hemostasis was appreciated.  The uterus was then    returned back to the maternal abdomen.  Gutters were freed of clots.  Uterine    incision and subfascial space were noted to be hemostatic.  Peritoneum was    then reapproximated with 2-0 Vicryl running fashion in one segment.  The    fascia was then reapproximated with 0 Vicryl in running fashion in one    segment.  Subcutaneous tissue was irrigated.  Hemostasis was achieved with the   use of electrocautery.  Skin was closed with a 4-0 subcuticular stitch.     Patient tolerated the procedure well and was taken to the recovery in stable    condition.        ____________________________________     PHU GRANT MD

## 2022-02-14 NOTE — H&P
DATE OF ADMISSION:  2022        ADDENDUM     HISTORY OF PRESENT ILLNESS:  Briefly this is a 26-year-old  1, para 0   at 39-5/7 weeks' gestation based on last menstrual period consistent with an   8-week ultrasound followed by our [facility],   who presented in active labor at 5 cm dilation.     Overall reassuring fetal status was noted.  She was group B strep negative and   we will plan on proceeding forward with admission at this time. Given she is   in active labor she is also interested in proceeding forward with a continuous   lumbar epidural.              ______________________________  MD BLANCA Burks/PASQUALE    DD:  2022 22:55  DT:  2022 23:10    Job#:  [id number]

## 2022-02-14 NOTE — PROGRESS NOTES
Progress Note    Subjective:   Comfortable with the ROCIO    Objective Data:  Recent Labs     02/13/22 2115   WBC 10.5   RBC 4.18*   HEMOGLOBIN 12.4   HEMATOCRIT 37.0   MCV 88.5   MCH 29.7   MCHC 33.5*   RDW 46.5   PLATELETCT 351   MPV 8.8*           Vitals:    02/13/22 2210 02/13/22 2220 02/13/22 2230 02/13/22 2300   BP: 126/74 127/85 131/80 133/85   Pulse: (!) 112 (!) 108 (!) 113 (!) 109   Resp:       Temp:    36.4 °C (97.5 °F)   TempSrc:    Temporal   SpO2: 98%   97%   Weight:       Height:         ABdomen: soft gravid non tender  SVE: C/C/+1     FHTs: 130s now but had a prolonged deceleration with one push after a period of rest after decelerations noted with previous pushing efforts for approximately 6 minutes with slow recovery  [place]: q 1 -2 min  No intake or output data in the 24 hours ending 02/14/22 0138    Current Facility-Administered Medications   Medication Dose Route Frequency Provider Last Rate Last Admin   • oxytocin (PITOCIN) 20 UNITS/1000ML LR (induction of labor)  0.5-20 suzy-units/min Intravenous Continuous Brendan Baldwin M.D. 6 mL/hr at 02/14/22 0058 2 suzy-units/min at 02/14/22 0058   • electrolyte-A (PLASMALYTE-A) (BOLUS) infusion 1,000 mL  1,000 mL Intravenous Once Dago Brock M.D.       • LR infusion   Intravenous Continuous Brendan Baldwin M.D.       • terbutaline (BRETHINE) injection 0.25 mg  0.25 mg Subcutaneous Once PRN Brendan Baldwin M.D.       • oxytocin (PITOCIN) infusion (for post delivery)  2,000 mL/hr Intravenous Once Brendan Baldwin M.D.        Followed by   • oxytocin (PITOCIN) infusion (for post delivery)  125 mL/hr Intravenous Continuous Brendan Baldwin M.D.       • oxytocin (PITOCIN) injection 10 Units  10 Units Intramuscular Once PRN Brendan Baldwin M.D.       • ibuprofen (MOTRIN) tablet 800 mg  800 mg Oral Once PRN Brendan Baldwin M.D.       • acetaminophen (TYLENOL) tablet 1,000 mg  1,000 mg Oral Once PRN Brendan Baldwin M.D.       • fentaNYL (SUBLIMAZE)  injection 50 mcg  50 mcg Intravenous Q HOUR PRN Brendan Baldwin M.D.        Or   • fentaNYL (SUBLIMAZE) injection 100 mcg  100 mcg Intravenous Q HOUR PRN Brendan Baldwin M.D.       • lactated ringers (BOLUS) infusion  1,000 mL Intravenous Once PRN Dago Brock M.D.       • ropivacaine 0.2 % (NAROPIN) injection   Epidural Continuous Dago Brock M.D.       • ePHEDrine injection 5 mg  5 mg Intravenous Q5 MIN PRN Dago Brock M.D.        And   • lactated ringers infusion (BOLUS)  250 mL Intravenous PRN Dago Brock M.D.         Facility-Administered Medications Ordered in Other Encounters   Medication Dose Route Frequency Provider Last Rate Last Admin   • bupivacaine 0.25% (SENSORCAINE-MARCAINE) pf injection   Epidural PRN Dago Brock M.D.   3 mL at 22   • fentaNYL (SUBLIMAZE) injection   Epidural PRN Dago Brock M.D.   50 mcg at 22       A/P 27 yo  at term with non reassuring fetal status with pushing. Now will proceed with the primary LTCS. Patient has been counseled regarding risks benefits and alternatives of the procedure.

## 2022-02-14 NOTE — PROGRESS NOTES
0700: Bedside report completed with EMILIANA Goode RN and assumed care of pt. Bed locked and in lowest position with personal belongings and call light within reach. Pt reports all needs met at this time.     0730: 12 hour chart check completed. Orders/MAR reviewed.     0900: Patient assessment completed. Discussed pain management plan and patient requests medications be provided as scheduled. Patient denies dizziness and headaches; BS normoactive x4 quads and pt reports passing flatus overnight. Reviewed plan of care, all questions answered, and rounding in place.

## 2022-02-15 PROCEDURE — 700102 HCHG RX REV CODE 250 W/ 637 OVERRIDE(OP): Performed by: SPECIALIST

## 2022-02-15 PROCEDURE — A9270 NON-COVERED ITEM OR SERVICE: HCPCS | Performed by: SPECIALIST

## 2022-02-15 PROCEDURE — 770002 HCHG ROOM/CARE - OB PRIVATE (112)

## 2022-02-15 RX ORDER — IBUPROFEN 800 MG/1
800 TABLET ORAL EVERY 8 HOURS PRN
Status: DISCONTINUED | OUTPATIENT
Start: 2022-02-18 | End: 2022-02-16 | Stop reason: HOSPADM

## 2022-02-15 RX ORDER — IBUPROFEN 800 MG/1
800 TABLET ORAL EVERY 8 HOURS
Status: DISCONTINUED | OUTPATIENT
Start: 2022-02-15 | End: 2022-02-16 | Stop reason: HOSPADM

## 2022-02-15 RX ADMIN — DOCUSATE SODIUM 100 MG: 100 CAPSULE, LIQUID FILLED ORAL at 09:36

## 2022-02-15 RX ADMIN — ESCITALOPRAM OXALATE 20 MG: 10 TABLET ORAL at 21:58

## 2022-02-15 RX ADMIN — ACETAMINOPHEN 1000 MG: 500 TABLET ORAL at 09:36

## 2022-02-15 RX ADMIN — IBUPROFEN 800 MG: 800 TABLET, FILM COATED ORAL at 21:03

## 2022-02-15 RX ADMIN — IBUPROFEN 800 MG: 800 TABLET, FILM COATED ORAL at 12:38

## 2022-02-15 RX ADMIN — PRENATAL WITH FERROUS FUM AND FOLIC ACID 1 TABLET: 3080; 920; 120; 400; 22; 1.84; 3; 20; 10; 1; 12; 200; 27; 25; 2 TABLET ORAL at 09:36

## 2022-02-15 RX ADMIN — IBUPROFEN 800 MG: 800 TABLET, FILM COATED ORAL at 04:38

## 2022-02-15 RX ADMIN — ACETAMINOPHEN 1000 MG: 500 TABLET ORAL at 15:49

## 2022-02-15 RX ADMIN — ACETAMINOPHEN 1000 MG: 500 TABLET ORAL at 21:58

## 2022-02-15 RX ADMIN — ACETAMINOPHEN 1000 MG: 500 TABLET ORAL at 01:50

## 2022-02-15 ASSESSMENT — EDINBURGH POSTNATAL DEPRESSION SCALE (EPDS)
I HAVE BEEN ABLE TO LAUGH AND SEE THE FUNNY SIDE OF THINGS: AS MUCH AS I ALWAYS COULD
I HAVE BLAMED MYSELF UNNECESSARILY WHEN THINGS WENT WRONG: NO, NEVER
I HAVE BEEN SO UNHAPPY THAT I HAVE BEEN CRYING: NO, NEVER
I HAVE FELT SAD OR MISERABLE: NO, NOT AT ALL
I HAVE FELT SCARED OR PANICKY FOR NO GOOD REASON: NO, NOT AT ALL
I HAVE BEEN ANXIOUS OR WORRIED FOR NO GOOD REASON: NO, NOT AT ALL
THE THOUGHT OF HARMING MYSELF HAS OCCURRED TO ME: NEVER
THINGS HAVE BEEN GETTING ON TOP OF ME: NO, I HAVE BEEN COPING AS WELL AS EVER
I HAVE LOOKED FORWARD WITH ENJOYMENT TO THINGS: AS MUCH AS I EVER DID
I HAVE BEEN SO UNHAPPY THAT I HAVE HAD DIFFICULTY SLEEPING: NOT AT ALL

## 2022-02-15 ASSESSMENT — PAIN DESCRIPTION - PAIN TYPE
TYPE: ACUTE PAIN;SURGICAL PAIN
TYPE: ACUTE PAIN
TYPE: ACUTE PAIN;SURGICAL PAIN
TYPE: ACUTE PAIN;SURGICAL PAIN

## 2022-02-15 NOTE — PROGRESS NOTES
Report received from Evelia SAXENA. Pt has voided once but unsure of amount. Per pt it was a small amount. Pt assessment complete. Pt states no pressure or pain at this time. Will perform bladder scan to confirm urine amount.     Bladder scan 270 mL.    Pt has been voiding without difficulty.

## 2022-02-15 NOTE — CARE PLAN
The patient is Watcher - Medium risk of patient condition declining or worsening    Shift Goals  Clinical Goals: VSS    Progress made toward(s) clinical / shift goals:  VSS     Problem: Knowledge Deficit - Standard  Goal: Patient and family/care givers will demonstrate understanding of plan of care, disease process/condition, diagnostic tests and medications  Outcome: Progressing  NOTE: Discussed pain management and verbalization of pain utilizing the 0-10 pain scale. White board updated with times of pain medication availability and pt requests pain medication be provided as available/scheduled. Discussed non-pharmacological pain control therapies including splinting with a pillow and light abdominal stretching. Abdominal binder provided per request and education provided on usage. Pt ambulates with a steady gait and demonstrates the ability to participate in ADLs and provide care for .       Problem: Altered Physiologic Condition  Goal: Patient physiologically stable as evidenced by normal lochia, palpable uterine involution and vitals within normal limits  Outcome: Progressing  NOTE: Patient is physiologically stable as evidenced by scant lochia rubra and firm fundus one fingerbreath below the umbilicus. Pt BP, O2, and RR within parameters. HR throughout shift ranging between 100's-110's. Physician notified and orders in place. Will continue to monitor patient condition.         Patient is not progressing towards the following goals: NA

## 2022-02-15 NOTE — PROGRESS NOTES
"1115: Arnold catheter removed. Pt encouraged to void within 6 hours of catheter removal. Pt verbalized understanding.     1730: Patient reports she was able to void a \"small amount\" without difficulty. No measuring hats available on unit. Pt denies feeling full or feeling she is retaining urine. Pt encouraged to try to void again to ensure she is able to void an adequate volume.     1830: Pt reports she has not been able to void since 1730 despite attempting several times. Pt to be bladder scanned after change of shift.   "

## 2022-02-15 NOTE — PROGRESS NOTES
Progress Note    Subjective:   Doing well. No issues or concerns. BF well. Pain well controlled. No sig bleeding.     Objective Data:  Recent Labs     02/13/22  2115 02/14/22  1125   WBC 10.5 22.2*   RBC 4.18* 3.67*   HEMOGLOBIN 12.4 11.1*   HEMATOCRIT 37.0 33.1*   MCV 88.5 90.2   MCH 29.7 30.2   MCHC 33.5* 33.5*   RDW 46.5 47.8   PLATELETCT 351 311   MPV 8.8* 8.9*           Vitals:    02/14/22 1800 02/14/22 2000 02/14/22 2220 02/15/22 0600   BP:  119/80 105/70 108/65   Pulse: 96 97 95 83   Resp: 18 16 16 16   Temp:  36.1 °C (97 °F) 36.3 °C (97.3 °F) 35.9 °C (96.6 °F)   TempSrc:  Temporal Temporal Temporal   SpO2: 98% 97% 97% 96%   Weight:       Height:         Abdomen: soft non tender fundus with c/d/i bandage over incision  Perineum: no sig bleeding or discharge  Ext: non tender calves    Intake/Output Summary (Last 24 hours) at 2/15/2022 0925  Last data filed at 2/14/2022 1100  Gross per 24 hour   Intake --   Output 475 ml   Net -475 ml       Current Facility-Administered Medications   Medication Dose Route Frequency Provider Last Rate Last Admin   • ibuprofen (MOTRIN) tablet 800 mg  800 mg Oral Q8HRS Brendan Baldwin M.D.   800 mg at 02/15/22 0438    Followed by   • [START ON 2/18/2022] ibuprofen (MOTRIN) tablet 800 mg  800 mg Oral Q8HRS PRN Brendan Baldwin M.D.       • diphenhydrAMINE (BENADRYL) injection 12.5 mg  12.5 mg Intravenous Q6HRS PRN Dago Brock M.D.       • naloxone (NARCAN) 0.4 mg in NS 1,000 mL infusion  0.4 mg Intravenous PRN Dago Brock M.D.       • lactated ringers infusion   Intravenous PRN Brendan Baldwin M.D.       • acetaminophen (TYLENOL) tablet 1,000 mg  1,000 mg Oral Q6HRS Brendan Baldwin M.D.   1,000 mg at 02/15/22 0150    Followed by   • [START ON 2/18/2022] acetaminophen (TYLENOL) tablet 1,000 mg  1,000 mg Oral Q6HRS PRN Brendan Baldwin M.D.       • oxyCODONE immediate-release (ROXICODONE) tablet 5 mg  5 mg Oral Q4HRS PRN Brendan Baldwin M.D.       • oxyCODONE  immediate release (ROXICODONE) tablet 10 mg  10 mg Oral Q4HRS PRN Brendan Baldwin M.D.       • ondansetron (ZOFRAN) syringe/vial injection 4 mg  4 mg Intravenous Q6HRS PRN Brendan Baldwin M.D.        Or   • ondansetron (ZOFRAN ODT) dispertab 4 mg  4 mg Oral Q6HRS PRN Brendan Baldwin M.D.       • diphenhydrAMINE (BENADRYL) tablet/capsule 25 mg  25 mg Oral Q6HRS PRN Brendan Baldwin M.D.        Or   • diphenhydrAMINE (BENADRYL) injection 25 mg  25 mg Intravenous Q6HRS PRN Brendan Baldwin M.D.       • docusate sodium (COLACE) capsule 100 mg  100 mg Oral BID PRN Brendan Baldwin M.D.   100 mg at 02/14/22 0853   • tetanus-dipth-acell pertussis (Tdap) inj 0.5 mL  0.5 mL Intramuscular Once PRN Brendan Baldwin M.D.       • measles, mumps and rubella vaccine (MMR) injection 0.5 mL  0.5 mL Subcutaneous Once PRN Brendan Baldwin M.D.       • oxytocin (PITOCIN) infusion (for postpartum)  125 mL/hr Intravenous Continuous Brendan Baldwin M.D.   Dose not Required at 02/14/22 0515   • oxytocin (PITOCIN) injection 10 Units  10 Units Intramuscular Once PRN Brendan Baldwin M.D.       • miSOPROStol (CYTOTEC) tablet 800 mcg  800 mcg Rectal Once PRN Brendan Baldwin M.D.       • methylergonovine (METHERGINE) injection 0.2 mg  0.2 mg Intramuscular Once PRN Brendan Baldwin M.D.       • carboPROST (HEMABATE) injection 250 mcg  250 mcg Intramuscular Once PRN Brendan Baldwin M.D.       • metoclopramide (REGLAN) injection 10 mg  10 mg Intravenous Q6HRS PRN Brendan Baldwin M.D.       • bisacodyl (DULCOLAX) suppository 10 mg  10 mg Rectal PRN Brendan Baldwin M.D.       • magnesium hydroxide (MILK OF MAGNESIA) suspension 30 mL  30 mL Oral Q6HRS PRN Brendan Baldwin M.D.       • prenatal plus vitamin (STUARTNATAL 1+1) 27-1 MG tablet 1 Tablet  1 Tablet Oral Daily-0800 Brendan Baldwin M.D.   1 Tablet at 02/14/22 0853   • lactated ringers infusion   Intravenous Continuous Brendan Baldwin M.D.   Stopped at 02/14/22 1729   • escitalopram  (Lexapro) tablet 20 mg  20 mg Oral Nightly Brendan Baldwin M.D.   20 mg at 02/14/22 2220   • oxytocin (PITOCIN) infusion (for post delivery)  125 mL/hr Intravenous Continuous Brendan Baldwin M.D.   Stopped at 02/14/22 1121       A/P S/P Primary LTCS now POD #1. Plan to proceed with the usual pp management and will discharge home tomorrow if continues to meet discharge criteria. All questions were answered.

## 2022-02-15 NOTE — PROGRESS NOTES
Dr. Baldwin updated on pt HR trends and status. Continuous LR d/c'd with order for parameters for HR notification.

## 2022-02-15 NOTE — LACTATION NOTE
This note was copied from a baby's chart.  Follow up:    This LC called to bedside to assist with latch.  Infant in football hold on left side.  MOB c/o sore nipples, assessment showed reddened and cracked.  Lip stick shaped nipples once infant was delatched.   With permission, assisted to support infant closer to MOB chest, c cup hold, nipple to nose and able to hand express large drops of colostrum.  With minor changes, MOB able to achieve deep latch with lips flanged and rhythmic sucking noted. MOB reported increased comfort.    Discussed techniques to establish deep latch, proper positioning, infnat feeding patterns in first 5-6 days of life, changes in breasts as milk increases in volume including engorgement,  MOB able to do hand expression and latch independently.  Reviewed normal diaper output as milk increases in volume.      Given [facility] Breastfeeding resources and [place] Breastfeeding videos.     Plan: use colostrum and lanolin to sore nipples, ensure deep latch, feed baby on cue or at least 8x in 24hr.  Wake baby if hasnt been fed in 4hrs.  Continue to monitor diaper output. Call with any questions.

## 2022-02-15 NOTE — CARE PLAN
The patient is Stable - Low risk of patient condition declining or worsening    Shift Goals  Clinical Goals: VSS    Progress made toward(s) clinical / shift goals:  VSS     Problem: Infection - Postpartum  Goal: Postpartum patient will be free of signs and symptoms of infection  Outcome: Progressing  NOTE: Patient is afebrile and absent for other signs/symptoms of infection. Vital signs WDL.  Will continue to monitor patient condition.       Problem: Altered Physiologic Condition  Goal: Patient physiologically stable as evidenced by normal lochia, palpable uterine involution and vitals within normal limits  Outcome: Progressing  NOTE: Patient is physiologically stable as evidenced by scant lochia rubra, firm fundus one fingerbreadth below the umbilicus, and vital signs WDL. Will continue to monitor patient condition.         Patient is not progressing towards the following goals: NA

## 2022-02-15 NOTE — CARE PLAN
The patient is Stable - Low risk of patient condition declining or worsening    Shift Goals  Clinical Goals: maitain tolerable pain level; ambulate    Progress made toward(s) clinical / shift goals:  pt pain has been 1-3 and made tolerable with the assistance of pain medication. Pt has been ambulating in the room and voiding without difficulty.     Patient is not progressing towards the following goals:

## 2022-02-15 NOTE — PROGRESS NOTES
0700: Bedside report completed with EMILIANA Goode RN and assumed care of pt. Bed locked and in lowest position with personal belongings and call light within reach. Pt reports all needs met at this time.     0730: 12 hour chart check completed. Orders/MAR reviewed.     0936: Hearing screen in progress - will round when finished.

## 2022-02-16 VITALS
TEMPERATURE: 98.1 F | DIASTOLIC BLOOD PRESSURE: 80 MMHG | SYSTOLIC BLOOD PRESSURE: 129 MMHG | HEIGHT: 60 IN | OXYGEN SATURATION: 97 % | BODY MASS INDEX: 39.27 KG/M2 | HEART RATE: 99 BPM | RESPIRATION RATE: 19 BRPM | WEIGHT: 200 LBS

## 2022-02-16 PROCEDURE — 700102 HCHG RX REV CODE 250 W/ 637 OVERRIDE(OP): Performed by: SPECIALIST

## 2022-02-16 PROCEDURE — A9270 NON-COVERED ITEM OR SERVICE: HCPCS | Performed by: SPECIALIST

## 2022-02-16 RX ADMIN — DOCUSATE SODIUM 100 MG: 100 CAPSULE, LIQUID FILLED ORAL at 09:27

## 2022-02-16 RX ADMIN — IBUPROFEN 800 MG: 800 TABLET, FILM COATED ORAL at 12:14

## 2022-02-16 RX ADMIN — PRENATAL WITH FERROUS FUM AND FOLIC ACID 1 TABLET: 3080; 920; 120; 400; 22; 1.84; 3; 20; 10; 1; 12; 200; 27; 25; 2 TABLET ORAL at 09:26

## 2022-02-16 RX ADMIN — IBUPROFEN 800 MG: 800 TABLET, FILM COATED ORAL at 04:36

## 2022-02-16 RX ADMIN — ACETAMINOPHEN 1000 MG: 500 TABLET ORAL at 04:36

## 2022-02-16 RX ADMIN — ACETAMINOPHEN 1000 MG: 500 TABLET ORAL at 12:14

## 2022-02-16 ASSESSMENT — PAIN DESCRIPTION - PAIN TYPE
TYPE: ACUTE PAIN;SURGICAL PAIN

## 2022-02-16 NOTE — PROGRESS NOTES
Progress Note    Subjective:   Doing well. No issues or concerns. Pain well controlled. No sig bleeding or discharge. BF well. No complaints today and wishes to go home.    Objective Data:  Recent Labs     02/13/22  2115 02/14/22  1125   WBC 10.5 22.2*   RBC 4.18* 3.67*   HEMOGLOBIN 12.4 11.1*   HEMATOCRIT 37.0 33.1*   MCV 88.5 90.2   MCH 29.7 30.2   MCHC 33.5* 33.5*   RDW 46.5 47.8   PLATELETCT 351 311   MPV 8.8* 8.9*           Vitals:    02/14/22 2220 02/15/22 0600 02/15/22 1800 02/16/22 0600   BP: 105/70 108/65 109/74 129/80   Pulse: 95 83 77 99   Resp: 16 16 16 19   Temp: 36.3 °C (97.3 °F) 35.9 °C (96.6 °F) 36.6 °C (97.9 °F) 36.7 °C (98.1 °F)   TempSrc: Temporal Temporal Temporal Temporal   SpO2: 97% 96% 98% 97%   Weight:       Height:         Abdomen: soft non tender fundus with covered incision with only min ttp 1/4  Perineum: no sig bleeding or discharge  Ext: non tender calves    No intake or output data in the 24 hours ending 02/16/22 0758    Current Facility-Administered Medications   Medication Dose Route Frequency Provider Last Rate Last Admin   • ibuprofen (MOTRIN) tablet 800 mg  800 mg Oral Q8HRS Brendan Baldwin M.D.   800 mg at 02/16/22 0436    Followed by   • [START ON 2/18/2022] ibuprofen (MOTRIN) tablet 800 mg  800 mg Oral Q8HRS PRN Brendan Baldwin M.D.       • diphenhydrAMINE (BENADRYL) injection 12.5 mg  12.5 mg Intravenous Q6HRS PRN Dago Brock M.D.       • naloxone (NARCAN) 0.4 mg in NS 1,000 mL infusion  0.4 mg Intravenous PRN Dago Brock M.D.       • lactated ringers infusion   Intravenous PRN Brendan Baldwin M.D.       • acetaminophen (TYLENOL) tablet 1,000 mg  1,000 mg Oral Q6HRS Brendan Baldwin M.D.   1,000 mg at 02/16/22 0436    Followed by   • [START ON 2/18/2022] acetaminophen (TYLENOL) tablet 1,000 mg  1,000 mg Oral Q6HRS PRN Brendan Baldwin M.D.       • oxyCODONE immediate-release (ROXICODONE) tablet 5 mg  5 mg Oral Q4HRS PRN Brendan Baldwin M.D.       •  oxyCODONE immediate release (ROXICODONE) tablet 10 mg  10 mg Oral Q4HRS PRN Brendan Baldwin M.D.       • ondansetron (ZOFRAN) syringe/vial injection 4 mg  4 mg Intravenous Q6HRS PRN Brendan Baldwin M.D.        Or   • ondansetron (ZOFRAN ODT) dispertab 4 mg  4 mg Oral Q6HRS PRN Brendan Baldwin M.D.       • diphenhydrAMINE (BENADRYL) tablet/capsule 25 mg  25 mg Oral Q6HRS PRN Brendan Baldwin M.D.        Or   • diphenhydrAMINE (BENADRYL) injection 25 mg  25 mg Intravenous Q6HRS PRN Brendan Baldwin M.D.       • docusate sodium (COLACE) capsule 100 mg  100 mg Oral BID PRN Brendan Baldwin M.D.   100 mg at 02/15/22 0936   • tetanus-dipth-acell pertussis (Tdap) inj 0.5 mL  0.5 mL Intramuscular Once PRN Brendan Baldwin M.D.       • measles, mumps and rubella vaccine (MMR) injection 0.5 mL  0.5 mL Subcutaneous Once PRN Brendan Baldwin M.D.       • oxytocin (PITOCIN) infusion (for postpartum)  125 mL/hr Intravenous Continuous Brendan Baldwin M.D.   Dose not Required at 02/14/22 0515   • oxytocin (PITOCIN) injection 10 Units  10 Units Intramuscular Once PRN Brendan Baldwin M.D.       • miSOPROStol (CYTOTEC) tablet 800 mcg  800 mcg Rectal Once PRN Brendan Baldwin M.D.       • methylergonovine (METHERGINE) injection 0.2 mg  0.2 mg Intramuscular Once PRN Brendan Baldwin M.D.       • carboPROST (HEMABATE) injection 250 mcg  250 mcg Intramuscular Once PRN Brendan Baldwin M.D.       • metoclopramide (REGLAN) injection 10 mg  10 mg Intravenous Q6HRS PRN Brendan Baldwin M.D.       • bisacodyl (DULCOLAX) suppository 10 mg  10 mg Rectal PRN Brendan Baldwin M.D.       • magnesium hydroxide (MILK OF MAGNESIA) suspension 30 mL  30 mL Oral Q6HRS PRN Brendan Baldwin M.D.       • prenatal plus vitamin (STUARTNATAL 1+1) 27-1 MG tablet 1 Tablet  1 Tablet Oral Daily-0800 Brendan Baldwin M.D.   1 Tablet at 02/15/22 0936   • lactated ringers infusion   Intravenous Continuous Brendan Baldwin M.D.   Stopped at 02/14/22 0852   •  escitalopram (Lexapro) tablet 20 mg  20 mg Oral Nightly Brendan Baldwin M.D.   20 mg at 02/15/22 8606   • oxytocin (PITOCIN) infusion (for post delivery)  125 mL/hr Intravenous Continuous Brendan Baldwin M.D.   Stopped at 02/14/22 1121       A/P S/P Primary LTCS now POD #2. Doing well and has met discharge criteria and wishes to go home at this time. All questions were answered.

## 2022-02-16 NOTE — DISCHARGE SUMMARY
DATE OF ADMISSION:  2022   DATE OF DISCHARGE:  2022     DISCHARGE DIAGNOSES:  1.  Status post primary low transverse  section.  2.  Uncomplicated postoperative and postpartum course.     HISTORY OF PRESENT ILLNESS:  This is a 26-year-old  1, para 0 at   39-5/7th weeks' gestation based on last menstrual period consistent with an   8-week ultrasound followed by our office [facility],   who presented in active labor at 5-cm dilation, overall reassuring fetal   status was noted.  She was group B strep negative and interested in proceeding   forward with admission at this time.     PAST MEDICAL HISTORY:  Can be found in dictated history and physical.     PHYSICAL EXAMINATION:  Can be found in dictated history and physical.     ASSESSMENT AND PLAN:  A 26-year-old  1, para 0 at term, presenting in   active labor.  Overall, reassuring fetal status.  We will plan on proceeding   forward with admission and delivery at this time.     HOSPITAL COURSE:  As stated above, the patient was admitted.  She was granted   continuous lumbar epidural to optimize pain management.  She progressed well.    She did have intermittent periods of decelerations during the course of her   labor, which were corrected with positional changes and arrived at complete,   complete and +1. With pushing, she began having early variable and late   decelerations.  She was allowed to labor down for a period of time; however,   made no further descent past a +1 station after nearly two hours of   observation.  She did start pushing once again after a period of rest and   fetal status overall reassuring at that time and had a deceleration of   approximately 6 minutes with a slow recovery.  The patient was counseled   regarding proceeding forward with a primary low transverse  section   for fetal intolerance of labor and failure to descend in the pelvis.  The   patient after counseling wished to  proceed forward with a primary low   transverse  section for this indication.     She did undergo primary low transverse  section with an estimated   blood loss of 700 mL,at the time of delivery, there was a vigorous female   infant.  Apgars were 7 and 9 at 1 and 5 minutes respectively.     Her postoperative course was unremarkable and on postoperative day #2, she is   ambulating, voiding well, tolerating a regular diet.  Her pain was well   controlled with p.o. pain medication.  She was breastfeeding well.  Her   incision was covered with a clean, dry and intact bandage.  She was afebrile   throughout her entire postoperative course and on postoperative day #2 had met   all discharge criteria and was wishing to go home.     DISCHARGE PLAN:  Follow up in 1 week for removal of her dressing incisional   evaluation.     DISCHARGE INSTRUCTIONS:  She is to call with any increased temperature greater   than 100.4, increasing vaginal bleeding, abdominal pain unrelieved with any   p.o. pain medication.  Call with any other questions or concerns.     DISCHARGE MEDICATIONS:  Motrin and Norco.        ______________________________  Brendan Baldwin MD    [facility] [place]    DD:  2022 08:05  DT:  2022 09:20    Job#:  [id number]

## 2022-02-16 NOTE — DISCHARGE INSTRUCTIONS
PATIENT DISCHARGE EDUCATION INSTRUCTION SHEET  REASONS TO CALL YOUR OBSTETRICIAN  · Persistent fever, shaking, chills (Temperature higher than 100.4) may indicate you have an infection  · Heavy bleeding: soaking more than 1 pad per hour; Passing clots an egg-sized clot or bigger may mean you have an postpartum hemorrhage  · Foul odor from vagina or bad smelling or discolored discharge or blood  · Breast infection (Mastitis symptoms); breast pain, chills, fever, redness or red streaks, may feel flu like symptoms  · Urinary pain, burning or frequency  · Incision that is not healing, increased redness, swelling, tenderness or pain, or any pus from episiotomy or  site may mean you have an infection  · Redness, swelling, warmth, or painful to touch in the calf area of your leg may mean you have a blood clot  · Severe or intensified depression, thoughts or feelings of wanting to hurt yourself or someone else   · Pain in chest, obstructed breathing or shortness of breath (trouble catching your breath) may mean you are having a postpartum complication. Call your provider immediately   · Headache that does not get better, even after taking medicine, a bad headache with vision changes or pain in the upper right area of your belly may mean you have high blood pressure or post birth preeclampsia. Call your provider immediately    HAND WASHING  All family and friends should wash their hands:  · Before and after holding the baby  · Before feeding the baby  · After using the restroom or changing the baby's diaper    WOUND CARE  Ask your physician for additional care instructions. In general:  ·  Incision:  · May shower and pat incision dry   · Keep the incision clean and dry  · There should not be any opening or pus from the incision  · Continue to walk at home 3 times a day   · Do NOT lift anything heavier than your baby (over 10 pounds)  · Encourage family to help participate in care of the  to allow  "rest and mom time to heal    VAGINAL CARE AND BLEEDING  · Nothing inside vagina for 6 weeks:   · No sexual intercourse, tampons or douching  · Bleeding may continue for 2-4 weeks. Amount and color may vary  · Soaking 1 pad or more in an hour for several hours is considered heavy bleeding  · Passing large egg sized blood clots can be concerning  · If you feel like you have heavy bleeding or are having increasing amount of blood clots call your Obstetrician immediately  · If you begin feeling faint upon standing, feeling sick to your stomach, have clammy skin, a really fast heartbeat, have chills, start feeling confused, dizzy, sleepy or weak, or feeling like you're going to faint call your Obstetrician immediately    HYPERTENSION   Preeclampsia or gestational hypertension are types of high blood pressure that only pregnant women can get. It is important for you to be aware of symptoms to seek early intervention and treatment. If you have any of these symptoms immediately call your Obstetrician    · Vision changes or blurred vision   · Severe headache or pain that is unrelieved with medication and will not go away  · Persistent pain in upper abdomen or shoulder   · Increased swelling of face, feet, or hands  · Difficulty breathing or shortness of breath at rest  · Urinating less than usual    URINATION AND BOWEL MOVEMENTS  · Eating more fiber (bran cereal, fruits, and vegetables) and drinking plenty of fluids will help to avoid constipation  · Urinary frequency and urgency after childbirth is normal  · If you experience any urinary pain, burning or frequency call your provider    BIRTH CONTROL  · It is possible to become pregnant at any time after delivery and while breastfeeding  · Plan to discuss a method of birth control with your physician at your post delivery follow up visit    POSTPARTUM BLUES  During the first few days after birth, you may experience a sense of the \"blues\" which may include impatience, " "irritability or even crying. These feelings come and go quickly. However, as many as 1 in 10 women experience emotional symptoms known as postpartum depression.     POSTPARTUM DEPRESSION  May start as early as the second or third day after delivery or take several weeks or months to develop. Symptoms of \"blues\" are present, but are more intense: Crying spells; loss of appetite; feelings of hopelessness or loss of control; fear of touching the baby; over concern or no concern at all about the baby; little or no concern about your own appearance/caring for yourself; and/or inability to sleep or excessive sleeping. Contact your Obstetrician if you are experiencing any of these symptoms     PREVENTING SHAKEN BABY  If you are angry or stressed, PUT THE BABY IN THE CRIB, step away, take some deep breaths, and wait until you are calm to care for the baby. DO NOT SHAKE THE BABY. You are not alone, call a supporter for help.  · [facility] 24/7 crisis call line (540-862-0420) or (1-863.439.2187)  · You can also text them, text \"ANSWER\" (707029)      "

## 2022-02-16 NOTE — PROGRESS NOTES
Report received from Evelia SAXENA. Pt assessment complete. Pt states feeling some pain at the incision; Motrin given. Pt has been ambulating in the hallways today. Pt states breastfeeding is going well. Nipples do show bruising and scabbing. Lanolin given. Pt aware to call for assistance as needed. Call light is within reach.

## 2022-02-16 NOTE — CARE PLAN
The patient is Stable - Low risk of patient condition declining or worsening    Shift Goals  Clinical Goals: maintain tolerable pain level; ambulate    Progress made toward(s) clinical / shift goals:  pt pain has been alleviated with the use of scheduled tylenol and motrin. Pt has been ambulating with a steady gait.    Patient is not progressing towards the following goals:

## 2022-02-17 NOTE — PROGRESS NOTES
Discharge education reviewed with patient and partner. No medications prescribed in [facility] but patient reports Dr. Chacon called in prescription medications for tylenol, motrin, and roxicodone. Pt provided list of last medication administration times. Pt verbalized understanding of discharge instructions including follow-up appointments. Patient given copies of discharge education, discharge summary, Controlled Substances Use informed consent, and pain management handout. Pt verbalizes understanding and all questions answered.

## 2022-02-17 NOTE — CARE PLAN
The patient is Stable - Low risk of patient condition declining or worsening    Shift Goals  Clinical Goals: maintain tolerable pain level; ambulate    Progress made toward(s) clinical / shift goals:  VSS; all clinical goals met for discharge.     Problem: Knowledge Deficit - L&D  Goal: Patient and family/caregivers will demonstrate understanding of plan of care, disease process/condition, diagnostic tests and medications  Outcome: Met     Problem: Risk for Excess Fluid Volume  Goal: Patient will demonstrate pulse, blood pressure and neurologic signs within expected ranges and without any respiratory complications  Outcome: Met     Problem: Knowledge Deficit - Standard  Goal: Patient and family/care givers will demonstrate understanding of plan of care, disease process/condition, diagnostic tests and medications  Outcome: Met     Problem: Knowledge Deficit - Postpartum  Goal: Patient will verbalize and demonstrate understanding of self and infant care  Outcome: Met     Problem: Psychosocial - Postpartum  Goal: Patient will verbalize and demonstrate effective bonding and parenting behavior  Outcome: Met     Problem: Altered Physiologic Condition  Goal: Patient physiologically stable as evidenced by normal lochia, palpable uterine involution and vitals within normal limits  Outcome: Met     Problem: Infection - Postpartum  Goal: Postpartum patient will be free of signs and symptoms of infection  Outcome: Met     Problem: Bowel Elimination - Post Surgical  Goal: Patient will resume regular bowel sounds and function with no discomfort or distention  Outcome: Met     Problem: Early Mobilization - Post Surgery  Goal: Early mobilization post surgery  Outcome: Met     Problem: Pain - Standard  Goal: Alleviation of pain or a reduction in pain to the patient’s comfort goal  Outcome: Met        Patient is not progressing towards the following goals: NA

## 2022-02-17 NOTE — PROGRESS NOTES
0700: Bedside report completed with EMILIANA Goode RN and assumed care of pt. 12 hour chart check completed. Orders/MAR reviewed.     1000: Patient assessment completed. Discussed pain management plan and patient requests medications be provided as scheduled. Patient denies dizziness and headaches; states she is voiding w/o difficulty and passing flatus. Reviewed plan of care, all questions answered, and rounding in place.

## 2022-03-13 NOTE — PROGRESS NOTES
Care Management Follow Up    Length of Stay (days): 20    Expected Discharge Date: 03/15/2022     Concerns to be Addressed:       Patient plan of care discussed at interdisciplinary rounds: Yes    Anticipated Discharge Disposition:  Home with Skilled services for RN, PT and OT.     Anticipated Discharge Services:    Anticipated Discharge DME:      Patient/family educated on Medicare website which has current facility and service quality ratings:    Education Provided on the Discharge Plan:    Patient/Family in Agreement with the Plan: yes    Referrals Placed by CM/SW:    Private pay costs discussed: Not applicable    Additional Information:  Writer reviewed discharge plan with Dr Millan, bedside RN's and patient.  Met with patient and explained writer believes it is unlikely [facility] insurance will authorize patient's request for SNF benefit in a TCU because she continues to improve and therapy no longer recommends TCU.  Explained she could pay privately however patient declines.    Discussed that under her insurance she should be eligible to receive short term RN visits for diabetic teaching follow thru and therapy which is a benefit under Medicare.  Patient's insurance is a managed Medicare product.   Reviewed with patient that Dr Millan's opinion is  that as patient is tapered off Prednisone her blood sugars will come down and she may be able to return to her oral diabetic medication.    Patient shares she has a meter at home which is at least 10 years old and she would like a new one if insurance will allow.   She has been willing to learn how to use the meter and give herself insulin and the nurses have already begun teaching.   Patient's daughter will provide transport on the day of discharge and  her daughter needs to find a replacement for her business on the day patient leaves. Patient requests on the day of discharge, she be able to leave later morning or early afternoon.  Writer suggested discharge may occur  Placed discharge outreach phone call to patient s/p ER discharge 3/24/18.  Left voicemail providing my contact information and instructions to call with any questions or concerns.    tomorrow pending her ability to demonstrate ability to check her blood sugar and give insulin and that discharge date is up to the doctor.   Patient has a life alert system thru [employer] however has lost the pendent.  Writer looked up their customer service number and patient plans to call to request a replacement.  Patient explains her daughter runs a day care and her visits are in the evening.   Patient was comforted by knowing she will have short term skilled visits at home.   With patient permission have sent an email to [facility] to ask if they as a home care provider  are in patient's [facility] network.        Roxana Zimmer, ERICKSW

## 2023-04-11 SDOH — ECONOMIC STABILITY: FOOD INSECURITY: WITHIN THE PAST 12 MONTHS, YOU WORRIED THAT YOUR FOOD WOULD RUN OUT BEFORE YOU GOT MONEY TO BUY MORE.: NEVER TRUE

## 2023-04-11 SDOH — HEALTH STABILITY: PHYSICAL HEALTH: ON AVERAGE, HOW MANY MINUTES DO YOU ENGAGE IN EXERCISE AT THIS LEVEL?: 20 MIN

## 2023-04-11 SDOH — HEALTH STABILITY: PHYSICAL HEALTH: ON AVERAGE, HOW MANY DAYS PER WEEK DO YOU ENGAGE IN MODERATE TO STRENUOUS EXERCISE (LIKE A BRISK WALK)?: 4 DAYS

## 2023-04-11 SDOH — ECONOMIC STABILITY: HOUSING INSECURITY
IN THE LAST 12 MONTHS, WAS THERE A TIME WHEN YOU DID NOT HAVE A STEADY PLACE TO SLEEP OR SLEPT IN A SHELTER (INCLUDING NOW)?: NO

## 2023-04-11 SDOH — ECONOMIC STABILITY: INCOME INSECURITY: IN THE LAST 12 MONTHS, WAS THERE A TIME WHEN YOU WERE NOT ABLE TO PAY THE MORTGAGE OR RENT ON TIME?: NO

## 2023-04-11 SDOH — ECONOMIC STABILITY: TRANSPORTATION INSECURITY
IN THE PAST 12 MONTHS, HAS THE LACK OF TRANSPORTATION KEPT YOU FROM MEDICAL APPOINTMENTS OR FROM GETTING MEDICATIONS?: NO

## 2023-04-11 SDOH — ECONOMIC STABILITY: FOOD INSECURITY: WITHIN THE PAST 12 MONTHS, THE FOOD YOU BOUGHT JUST DIDN'T LAST AND YOU DIDN'T HAVE MONEY TO GET MORE.: NEVER TRUE

## 2023-04-11 SDOH — ECONOMIC STABILITY: INCOME INSECURITY: HOW HARD IS IT FOR YOU TO PAY FOR THE VERY BASICS LIKE FOOD, HOUSING, MEDICAL CARE, AND HEATING?: NOT VERY HARD

## 2023-04-11 SDOH — ECONOMIC STABILITY: TRANSPORTATION INSECURITY
IN THE PAST 12 MONTHS, HAS LACK OF TRANSPORTATION KEPT YOU FROM MEETINGS, WORK, OR FROM GETTING THINGS NEEDED FOR DAILY LIVING?: NO

## 2023-04-11 ASSESSMENT — LIFESTYLE VARIABLES
HOW OFTEN DO YOU HAVE SIX OR MORE DRINKS ON ONE OCCASION: NEVER
HOW OFTEN DO YOU HAVE A DRINK CONTAINING ALCOHOL: MONTHLY OR LESS
AUDIT-C TOTAL SCORE: 1
SKIP TO QUESTIONS 9-10: 1
HOW MANY STANDARD DRINKS CONTAINING ALCOHOL DO YOU HAVE ON A TYPICAL DAY: 1 OR 2

## 2023-04-11 ASSESSMENT — SOCIAL DETERMINANTS OF HEALTH (SDOH)
DO YOU BELONG TO ANY CLUBS OR ORGANIZATIONS SUCH AS CHURCH GROUPS UNIONS, FRATERNAL OR ATHLETIC GROUPS, OR SCHOOL GROUPS?: NO
HOW OFTEN DO YOU ATTEND CHURCH OR RELIGIOUS SERVICES?: NEVER
HOW OFTEN DO YOU GET TOGETHER WITH FRIENDS OR RELATIVES?: ONCE A WEEK
HOW OFTEN DO YOU ATTENT MEETINGS OF THE CLUB OR ORGANIZATION YOU BELONG TO?: NEVER
IN A TYPICAL WEEK, HOW MANY TIMES DO YOU TALK ON THE PHONE WITH FAMILY, FRIENDS, OR NEIGHBORS?: THREE TIMES A WEEK

## 2023-04-12 ENCOUNTER — OFFICE VISIT (OUTPATIENT)
Dept: MEDICAL GROUP | Facility: PHYSICIAN GROUP | Age: 28
End: 2023-04-12
Payer: COMMERCIAL

## 2023-04-12 VITALS
BODY MASS INDEX: 29.88 KG/M2 | TEMPERATURE: 98 F | WEIGHT: 152.2 LBS | HEART RATE: 96 BPM | OXYGEN SATURATION: 98 % | DIASTOLIC BLOOD PRESSURE: 60 MMHG | SYSTOLIC BLOOD PRESSURE: 96 MMHG | HEIGHT: 60 IN

## 2023-04-12 DIAGNOSIS — F41.9 ANXIETY AND DEPRESSION: ICD-10-CM

## 2023-04-12 DIAGNOSIS — Z97.5 NEXPLANON IN PLACE: ICD-10-CM

## 2023-04-12 DIAGNOSIS — F12.90 MARIJUANA USE: ICD-10-CM

## 2023-04-12 DIAGNOSIS — Z23 NEED FOR VACCINATION: ICD-10-CM

## 2023-04-12 DIAGNOSIS — F32.A ANXIETY AND DEPRESSION: ICD-10-CM

## 2023-04-12 DIAGNOSIS — N80.9 ENDOMETRIOSIS: ICD-10-CM

## 2023-04-12 DIAGNOSIS — Z12.72 ENCOUNTER FOR FOLLOW-UP VAGINAL PAPANICOLAOU SMEAR: ICD-10-CM

## 2023-04-12 PROBLEM — K82.8 BILIARY DYSKINESIA: Status: RESOLVED | Noted: 2019-12-23 | Resolved: 2023-04-12

## 2023-04-12 PROBLEM — G89.18 ACUTE POSTOPERATIVE ABDOMINAL PAIN: Status: RESOLVED | Noted: 2019-12-23 | Resolved: 2023-04-12

## 2023-04-12 PROBLEM — R10.9 ACUTE POSTOPERATIVE ABDOMINAL PAIN: Status: RESOLVED | Noted: 2019-12-23 | Resolved: 2023-04-12

## 2023-04-12 PROCEDURE — 99214 OFFICE O/P EST MOD 30 MIN: CPT | Mod: 25

## 2023-04-12 PROCEDURE — 90471 IMMUNIZATION ADMIN: CPT

## 2023-04-12 PROCEDURE — 90715 TDAP VACCINE 7 YRS/> IM: CPT

## 2023-04-12 RX ORDER — ESCITALOPRAM OXALATE 10 MG/1
10 TABLET ORAL DAILY
Qty: 90 TABLET | Refills: 0 | Status: SHIPPED | OUTPATIENT
Start: 2023-04-12 | End: 2023-04-21 | Stop reason: SDUPTHER

## 2023-04-12 RX ORDER — BUPROPION HYDROCHLORIDE 150 MG/1
150 TABLET ORAL EVERY MORNING
Qty: 90 TABLET | Refills: 0 | Status: SHIPPED | OUTPATIENT
Start: 2023-04-12 | End: 2023-04-21 | Stop reason: SDUPTHER

## 2023-04-12 SDOH — HEALTH STABILITY: PHYSICAL HEALTH: ON AVERAGE, HOW MANY DAYS PER WEEK DO YOU ENGAGE IN MODERATE TO STRENUOUS EXERCISE (LIKE A BRISK WALK)?: 4 DAYS

## 2023-04-12 SDOH — HEALTH STABILITY: MENTAL HEALTH
STRESS IS WHEN SOMEONE FEELS TENSE, NERVOUS, ANXIOUS, OR CAN'T SLEEP AT NIGHT BECAUSE THEIR MIND IS TROUBLED. HOW STRESSED ARE YOU?: VERY MUCH

## 2023-04-12 SDOH — ECONOMIC STABILITY: FOOD INSECURITY: WITHIN THE PAST 12 MONTHS, YOU WORRIED THAT YOUR FOOD WOULD RUN OUT BEFORE YOU GOT MONEY TO BUY MORE.: NEVER TRUE

## 2023-04-12 SDOH — ECONOMIC STABILITY: INCOME INSECURITY: HOW HARD IS IT FOR YOU TO PAY FOR THE VERY BASICS LIKE FOOD, HOUSING, MEDICAL CARE, AND HEATING?: NOT VERY HARD

## 2023-04-12 SDOH — HEALTH STABILITY: PHYSICAL HEALTH: ON AVERAGE, HOW MANY MINUTES DO YOU ENGAGE IN EXERCISE AT THIS LEVEL?: 20 MIN

## 2023-04-12 SDOH — ECONOMIC STABILITY: HOUSING INSECURITY

## 2023-04-12 SDOH — ECONOMIC STABILITY: INCOME INSECURITY: IN THE LAST 12 MONTHS, WAS THERE A TIME WHEN YOU WERE NOT ABLE TO PAY THE MORTGAGE OR RENT ON TIME?: NO

## 2023-04-12 SDOH — ECONOMIC STABILITY: FOOD INSECURITY: WITHIN THE PAST 12 MONTHS, THE FOOD YOU BOUGHT JUST DIDN'T LAST AND YOU DIDN'T HAVE MONEY TO GET MORE.: NEVER TRUE

## 2023-04-12 SDOH — ECONOMIC STABILITY: TRANSPORTATION INSECURITY
IN THE PAST 12 MONTHS, HAS LACK OF RELIABLE TRANSPORTATION KEPT YOU FROM MEDICAL APPOINTMENTS, MEETINGS, WORK OR FROM GETTING THINGS NEEDED FOR DAILY LIVING?: NO

## 2023-04-12 ASSESSMENT — SOCIAL DETERMINANTS OF HEALTH (SDOH)
HOW OFTEN DO YOU ATTENT MEETINGS OF THE CLUB OR ORGANIZATION YOU BELONG TO?: NEVER
HOW OFTEN DO YOU ATTENT MEETINGS OF THE CLUB OR ORGANIZATION YOU BELONG TO?: NEVER
HOW OFTEN DO YOU ATTEND CHURCH OR RELIGIOUS SERVICES?: NEVER
IN A TYPICAL WEEK, HOW MANY TIMES DO YOU TALK ON THE PHONE WITH FAMILY, FRIENDS, OR NEIGHBORS?: THREE TIMES A WEEK
WITHIN THE PAST 12 MONTHS, YOU WORRIED THAT YOUR FOOD WOULD RUN OUT BEFORE YOU GOT THE MONEY TO BUY MORE: NEVER TRUE
HOW OFTEN DO YOU HAVE A DRINK CONTAINING ALCOHOL: MONTHLY OR LESS
DO YOU BELONG TO ANY CLUBS OR ORGANIZATIONS SUCH AS CHURCH GROUPS UNIONS, FRATERNAL OR ATHLETIC GROUPS, OR SCHOOL GROUPS?: NO
DO YOU BELONG TO ANY CLUBS OR ORGANIZATIONS SUCH AS CHURCH GROUPS UNIONS, FRATERNAL OR ATHLETIC GROUPS, OR SCHOOL GROUPS?: NO
HOW MANY DRINKS CONTAINING ALCOHOL DO YOU HAVE ON A TYPICAL DAY WHEN YOU ARE DRINKING: 1 OR 2
HOW OFTEN DO YOU GET TOGETHER WITH FRIENDS OR RELATIVES?: ONCE A WEEK
HOW OFTEN DO YOU ATTEND CHURCH OR RELIGIOUS SERVICES?: NEVER
HOW OFTEN DO YOU HAVE SIX OR MORE DRINKS ON ONE OCCASION: NEVER
IN A TYPICAL WEEK, HOW MANY TIMES DO YOU TALK ON THE PHONE WITH FAMILY, FRIENDS, OR NEIGHBORS?: THREE TIMES A WEEK
HOW HARD IS IT FOR YOU TO PAY FOR THE VERY BASICS LIKE FOOD, HOUSING, MEDICAL CARE, AND HEATING?: NOT VERY HARD
HOW OFTEN DO YOU GET TOGETHER WITH FRIENDS OR RELATIVES?: ONCE A WEEK

## 2023-04-12 ASSESSMENT — PATIENT HEALTH QUESTIONNAIRE - PHQ9
5. POOR APPETITE OR OVEREATING: 3 - NEARLY EVERY DAY
CLINICAL INTERPRETATION OF PHQ2 SCORE: 3
SUM OF ALL RESPONSES TO PHQ QUESTIONS 1-9: 18

## 2023-04-12 ASSESSMENT — LIFESTYLE VARIABLES
HOW MANY STANDARD DRINKS CONTAINING ALCOHOL DO YOU HAVE ON A TYPICAL DAY: 1 OR 2
HOW OFTEN DO YOU HAVE SIX OR MORE DRINKS ON ONE OCCASION: NEVER
HOW OFTEN DO YOU HAVE A DRINK CONTAINING ALCOHOL: MONTHLY OR LESS
AUDIT-C TOTAL SCORE: 1
SKIP TO QUESTIONS 9-10: 1

## 2023-04-12 ASSESSMENT — FIBROSIS 4 INDEX: FIB4 SCORE: 0.39

## 2023-04-12 NOTE — ASSESSMENT & PLAN NOTE
Chronic, not controlled.  PHQ-9 score 18 - Not on medication  Patient has been on Escitalopram 20 mg from 2017 through January 2023.  She is inquiring about Wellbutrin.  I discussed the benefits of Wellbutrin and explained it is good with stimulating people who are experiencing symptoms of lack of motivation, sleeping, as well as it decreases appetite and helps with libido.  Patient did really well on Lexapro.  I let her know it is completely reasonable to be on both medications as Wellbutrin is a great adjunct to an SSRI.  I recommended we lower her dose of escitalopram 10 mg and add Wellbutrin 150 mg.  Patient agreed she liked this idea.  Discussed side effects.  Patient will return in 6 weeks for follow-up.

## 2023-04-12 NOTE — PROGRESS NOTES
Subjective:     CC:   Chief Complaint   Patient presents with    Establish Care    Medication Refill     lexapro       HISTORY OF THE PRESENT ILLNESS: Sulema is a pleasant 27 y.o. female here today to establish care and discuss getting restarted on Lexapro.    Problem   Nexplanon in Place    Placed in 2022.      Endometriosis    She has a lot of pain and issues with her period.  She was being followed by OB/GYN and is requesting a new referral today.     Anxiety and Depression    Patient states she has been on Escitalopram since 2017.  It was managed by her OB/GYN.  She stopped taking it in January of this year because he was not with the clinic and she cannot get refills.  She felt like she was doing okay, however, over the last few months, she states she is had severe anxiety, depression and paranoia.  She said her , she has been with for 10 years, says he has never seen her like this before and that she needs to see a doctor.   She does have a 1-year-old baby and states she finds it hard to be present and enjoy motherhood.      She did have a therapist through [facility], who recommended Wellbutrin.  She also has had friends on Wellbutrin who say it works really well for them.  She is inquiring about my thoughts on a medication.           History of Self-Harm    She has a history of when she was in her teens.  She denies any desire to harm herself now.      Marijuana Use    Helps with anxiety  Does not smoke cigarettes.     Labor and Delivery, Indication for Care (Resolved)   Biliary Dyskinesia (Resolved)   Acute Postoperative Abdominal Pain (Resolved)   History of Colonoscopy (Resolved)    2012      Intractable Migraine Without Status Migrainosus (Resolved)       Health Maintenance: Completed    ROS:  All systems negative expect as addressed in assessment and plan.     Objective:     Exam:  BP 96/60 (BP Location: Right arm, Patient Position: Sitting, BP Cuff Size: Adult)   Pulse 96   Temp 36.7 °C (98  °F) (Temporal)   Ht 1.524 m (5')   Wt 69 kg (152 lb 3.2 oz)   SpO2 98%   BMI 29.72 kg/m²  Body mass index is 29.72 kg/m².    Physical Exam  Vitals reviewed.   Constitutional:       Appearance: Normal appearance.   HENT:      Right Ear: Tympanic membrane normal.      Left Ear: Tympanic membrane normal.   Cardiovascular:      Rate and Rhythm: Normal rate.      Pulses: Normal pulses.      Heart sounds: Normal heart sounds.   Pulmonary:      Effort: Pulmonary effort is normal.      Breath sounds: Normal breath sounds.   Abdominal:      General: Abdomen is flat.      Palpations: Abdomen is soft.   Musculoskeletal:         General: Normal range of motion.   Skin:     General: Skin is warm and dry.   Neurological:      Mental Status: Mental status is at baseline.   Psychiatric:         Mood and Affect: Mood normal.         Behavior: Behavior normal.     Labs: Results reviewed from 02/13/22 (patient was pregnant during lab draw)    Assessment & Plan:     27 y.o. female with the following -    1. Anxiety and depression  Chronic, not controlled.  PHQ-9 score 18 - Not on medication  Patient has been on Escitalopram 20 mg from 2017 through January 2023.  She is inquiring about Wellbutrin.  I discussed the benefits of Wellbutrin and explained it is good with stimulating people who are experiencing symptoms of lack of motivation, sleeping, as well as it decreases appetite and helps with libido.  Patient did really well on Lexapro.  I let her know it is completely reasonable to be on both medications as Wellbutrin is a great adjunct to an SSRI.  I recommended we lower her dose of escitalopram 10 mg and add Wellbutrin 150 mg.  Patient agreed she liked this idea.  Discussed side effects.  Patient will return in 6 weeks for follow-up.  - escitalopram (LEXAPRO) 10 MG Tab; Take 1 Tablet by mouth every day.  Dispense: 90 Tablet; Refill: 0  - buPROPion (WELLBUTRIN XL) 150 MG XL tablet; Take 1 Tablet by mouth every morning.  Dispense:  90 Tablet; Refill: 0    2. Marijuana use    3. Endometriosis  4. Nexplanon in place  5. Encounter for follow-up vaginal Papanicolaou smear  - Referral to OB/Gyn    6. Need for vaccination  - Tdap Vaccine =>6YO IM    Patient was educated in proper administration of medication(s) ordered today including safety, possible SE, risks, benefits, rationale and alternatives to therapy.   Supportive care, differential diagnoses, and indications for immediate follow-up discussed with patient.    Pathogenesis of diagnosis discussed including typical length and natural progression.    Instructed to return to clinic or nearest emergency department for any change in condition, further concerns, or worsening of symptoms.  Patient states understanding of the plan of care and discharge instructions.    Return in about 6 weeks (around 5/24/2023) for medication follow-up.    I have placed the above orders and discussed them with an approved delegating provider.  The MA is performing the below orders under the direction of Dr. Stewart.    Please note that this dictation was created using voice recognition software. I have worked with consultants from the vendor as well as technical experts from [employer] to optimize the interface. I have made every reasonable attempt to correct obvious errors, but I expect that there are errors of grammar and possibly content that I did not discover before finalizing the note.

## 2023-04-18 ENCOUNTER — TELEPHONE (OUTPATIENT)
Dept: MEDICAL GROUP | Facility: PHYSICIAN GROUP | Age: 28
End: 2023-04-18
Payer: COMMERCIAL

## 2023-04-21 DIAGNOSIS — F32.A ANXIETY AND DEPRESSION: ICD-10-CM

## 2023-04-21 DIAGNOSIS — F41.9 ANXIETY AND DEPRESSION: ICD-10-CM

## 2023-04-21 RX ORDER — ESCITALOPRAM OXALATE 10 MG/1
10 TABLET ORAL DAILY
Qty: 90 TABLET | Refills: 0 | Status: SHIPPED | OUTPATIENT
Start: 2023-04-21 | End: 2023-06-08 | Stop reason: SDUPTHER

## 2023-04-21 RX ORDER — BUPROPION HYDROCHLORIDE 150 MG/1
150 TABLET ORAL EVERY MORNING
Qty: 90 TABLET | Refills: 0 | Status: SHIPPED | OUTPATIENT
Start: 2023-04-21 | End: 2023-06-08 | Stop reason: SDUPTHER

## 2023-06-08 ENCOUNTER — OFFICE VISIT (OUTPATIENT)
Dept: MEDICAL GROUP | Facility: PHYSICIAN GROUP | Age: 28
End: 2023-06-08
Payer: COMMERCIAL

## 2023-06-08 VITALS
SYSTOLIC BLOOD PRESSURE: 124 MMHG | HEIGHT: 60 IN | OXYGEN SATURATION: 96 % | RESPIRATION RATE: 14 BRPM | WEIGHT: 150 LBS | DIASTOLIC BLOOD PRESSURE: 80 MMHG | BODY MASS INDEX: 29.45 KG/M2 | TEMPERATURE: 97.7 F | HEART RATE: 76 BPM

## 2023-06-08 DIAGNOSIS — F32.A ANXIETY AND DEPRESSION: ICD-10-CM

## 2023-06-08 DIAGNOSIS — F41.9 ANXIETY AND DEPRESSION: ICD-10-CM

## 2023-06-08 PROCEDURE — 3074F SYST BP LT 130 MM HG: CPT

## 2023-06-08 PROCEDURE — 99213 OFFICE O/P EST LOW 20 MIN: CPT

## 2023-06-08 PROCEDURE — 3079F DIAST BP 80-89 MM HG: CPT

## 2023-06-08 RX ORDER — BUPROPION HYDROCHLORIDE 150 MG/1
150 TABLET ORAL EVERY MORNING
Qty: 90 TABLET | Refills: 3 | Status: SHIPPED | OUTPATIENT
Start: 2023-06-08

## 2023-06-08 RX ORDER — ESCITALOPRAM OXALATE 10 MG/1
10 TABLET ORAL DAILY
Qty: 90 TABLET | Refills: 3 | Status: SHIPPED | OUTPATIENT
Start: 2023-06-08

## 2023-06-08 ASSESSMENT — FIBROSIS 4 INDEX: FIB4 SCORE: 0.39

## 2023-06-08 ASSESSMENT — PATIENT HEALTH QUESTIONNAIRE - PHQ9: CLINICAL INTERPRETATION OF PHQ2 SCORE: 0

## 2023-06-08 NOTE — PROGRESS NOTES
Subjective:     CC:   Chief Complaint   Patient presents with    Medication Follow-up       HISTORY OF THE PRESENT ILLNESS: Sulema is a pleasant 27 y.o. female here today to     Problem   Anxiety and Depression    Patient is here to follow-up on her medications after being started on Wellbutrin 150 mg in combination with her Escitalopram 10 mg for a PHQ-9 score of 18.  She reports today she is feeling much better and people around her are commenting on the difference in her mood.  She is not experiencing side effects and really likes the current dose she is on.  She states that she does not feel numb and is still experiencing normal emotions.  She states she still cries but only in appropriate situations.    Background:  Patient has been on escitalopram since 2017, which was managed by her OB/GYN.  She stopped taking it in January of this year because she can no longer get refills at that clinic.  She felt she was doing okay, however, over the last few months, she has experienced severe anxiety, depression and paranoia.  She finally came and saw me after encouragement from her  of 10 years who states he has never seen her like this.    She does have a 1-year-old baby and states she was finding it hard to be present and enjoy motherhood.             Health Maintenance: Completed    ROS:  All systems negative expect as addressed in assessment and plan.     Objective:     Exam:  /80 (BP Location: Right arm, Patient Position: Sitting, BP Cuff Size: Adult)   Pulse 76   Temp 36.5 °C (97.7 °F) (Temporal)   Resp 14   Ht 1.524 m (5')   Wt 68 kg (150 lb)   SpO2 96%   BMI 29.29 kg/m²  Body mass index is 29.29 kg/m².    Physical Exam  Vitals reviewed.   Constitutional:       Appearance: Normal appearance.   Cardiovascular:      Rate and Rhythm: Normal rate.   Pulmonary:      Effort: Pulmonary effort is normal.   Musculoskeletal:         General: Normal range of motion.   Skin:     General: Skin is warm and  dry.   Neurological:      Mental Status: Mental status is at baseline.   Psychiatric:         Mood and Affect: Mood normal.         Behavior: Behavior normal.      Comments: +Smiling; Appears happy       Assessment & Plan:     27 y.o. female with the following -    1. Anxiety and depression  Chronic, improving.  PHQ-9 score is 0 in clinic today.  We will continue this current regimen of Wellbutrin 150 mg daily and Lexapro 10 mg daily.  Refills provided.  Patient to follow-up in 6 months.  - buPROPion (WELLBUTRIN XL) 150 MG XL tablet; Take 1 Tablet by mouth every morning.  Dispense: 90 Tablet; Refill: 3  - escitalopram (LEXAPRO) 10 MG Tab; Take 1 Tablet by mouth every day.  Dispense: 90 Tablet; Refill: 3    Patient was educated in proper administration of medication(s) ordered today including safety, possible SE, risks, benefits, rationale and alternatives to therapy.   Supportive care, differential diagnoses, and indications for immediate follow-up discussed with patient.    Pathogenesis of diagnosis discussed including typical length and natural progression.    Instructed to return to clinic or nearest emergency department for any change in condition, further concerns, or worsening of symptoms.  Patient states understanding of the plan of care and discharge instructions.    Return in about 6 months (around 12/8/2023) for Medication Follow-up .    I have placed the above orders and discussed them with an approved delegating provider.  The MA is performing the below orders under the direction of Dr. Stewart.    Please note that this dictation was created using voice recognition software. I have worked with consultants from the vendor as well as technical experts from [facility] to optimize the interface. I have made every reasonable attempt to correct obvious errors, but I expect that there are errors of grammar and possibly content that I did not discover before finalizing the note.

## 2023-08-04 ENCOUNTER — APPOINTMENT (OUTPATIENT)
Dept: RADIOLOGY | Facility: MEDICAL CENTER | Age: 28
End: 2023-08-04
Attending: EMERGENCY MEDICINE
Payer: COMMERCIAL

## 2023-08-04 ENCOUNTER — HOSPITAL ENCOUNTER (EMERGENCY)
Facility: MEDICAL CENTER | Age: 28
End: 2023-08-04
Attending: EMERGENCY MEDICINE
Payer: COMMERCIAL

## 2023-08-04 VITALS
BODY MASS INDEX: 27.18 KG/M2 | HEART RATE: 82 BPM | HEIGHT: 60 IN | OXYGEN SATURATION: 96 % | DIASTOLIC BLOOD PRESSURE: 76 MMHG | SYSTOLIC BLOOD PRESSURE: 126 MMHG | WEIGHT: 138.45 LBS | TEMPERATURE: 97.8 F | RESPIRATION RATE: 17 BRPM
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
## 2023-08-04 DIAGNOSIS — S61.451A DOG BITE OF RIGHT HAND, INITIAL ENCOUNTER: ICD-10-CM

## 2023-08-04 PROCEDURE — 304217 HCHG IRRIGATION SYSTEM

## 2023-08-04 PROCEDURE — 99283 EMERGENCY DEPT VISIT LOW MDM: CPT

## 2023-08-04 PROCEDURE — 73090 X-RAY EXAM OF FOREARM: CPT | Mod: RT

## 2023-08-04 PROCEDURE — 700102 HCHG RX REV CODE 250 W/ 637 OVERRIDE(OP): Performed by: EMERGENCY MEDICINE

## 2023-08-04 PROCEDURE — A6403 STERILE GAUZE>16 <= 48 SQ IN: HCPCS

## 2023-08-04 PROCEDURE — A9270 NON-COVERED ITEM OR SERVICE: HCPCS | Performed by: EMERGENCY MEDICINE

## 2023-08-04 PROCEDURE — 73130 X-RAY EXAM OF HAND: CPT | Mod: RT

## 2023-08-04 PROCEDURE — 700101 HCHG RX REV CODE 250: Performed by: EMERGENCY MEDICINE

## 2023-08-04 RX ORDER — AMOXICILLIN AND CLAVULANATE POTASSIUM 875; 125 MG/1; MG/1
1 TABLET, FILM COATED ORAL 2 TIMES DAILY
Qty: 14 TABLET | Refills: 0 | Status: ACTIVE | OUTPATIENT
Start: 2023-08-04 | End: 2023-08-11

## 2023-08-04 RX ORDER — AMOXICILLIN AND CLAVULANATE POTASSIUM 875; 125 MG/1; MG/1
1 TABLET, FILM COATED ORAL ONCE
Status: COMPLETED | OUTPATIENT
Start: 2023-08-04 | End: 2023-08-04

## 2023-08-04 RX ORDER — HYDROCODONE BITARTRATE AND ACETAMINOPHEN 5; 325 MG/1; MG/1
1 TABLET ORAL ONCE
Status: COMPLETED | OUTPATIENT
Start: 2023-08-04 | End: 2023-08-04

## 2023-08-04 RX ORDER — LIDOCAINE HYDROCHLORIDE 20 MG/ML
20 INJECTION, SOLUTION INFILTRATION; PERINEURAL ONCE
Status: COMPLETED | OUTPATIENT
Start: 2023-08-04 | End: 2023-08-04

## 2023-08-04 RX ADMIN — LIDOCAINE HYDROCHLORIDE 20 ML: 20 INJECTION, SOLUTION INFILTRATION; PERINEURAL at 21:20

## 2023-08-04 RX ADMIN — AMOXICILLIN AND CLAVULANATE POTASSIUM 1 TABLET: 875; 125 TABLET, FILM COATED ORAL at 21:41

## 2023-08-04 RX ADMIN — HYDROCODONE BITARTRATE AND ACETAMINOPHEN 1 TABLET: 5; 325 TABLET ORAL at 23:00

## 2023-08-04 ASSESSMENT — FIBROSIS 4 INDEX: FIB4 SCORE: 0.39

## 2023-08-05 NOTE — ED TRIAGE NOTES
Bib mom for above complaints.     Chief Complaint   Patient presents with    Dog Bite     Pt own dog was caught in a fence and dog got scared and bit. Pt thinks dog is utd on vaccines.      /81   Pulse 82   Temp 36.5 °C (97.7 °F) (Temporal)   Resp 16   Ht 1.524 m (5')   Wt 62.8 kg (138 lb 7.2 oz)   LMP 08/04/2023   SpO2 100%   Breastfeeding No   BMI 27.04 kg/m²

## 2023-08-05 NOTE — ED NOTES
Patient is stable for discharge at this time, anticipatory guidance provided, close follow-up is encouraged, and ED return instructions have been detailed. Patient is both agreeable to the disposition and plan and discharged home in ambulatory state and in good condition.      Rx education provided, Pt verbalized understanding;     Pt educated on how to keep wounds clean and dry . Pt educated on s/s of infection

## 2023-08-05 NOTE — ED PROVIDER NOTES
ED Provider Note    CHIEF COMPLAINT  Chief Complaint   Patient presents with    Dog Bite     Pt own dog was caught in a fence and dog got scared and bit. Pt thinks dog is utd on vaccines.            JIM/KARTHIK Leone Beata Bull is a 27 y.o. female who presents to the emergency department for evaluation of dog bite to the right upper extremity.  The patient was trying to help her dog who was stuck on a fence and had a scare as it bit her twice.  1 to the base of the thumb in the webspace between the first and second digits and once in the forearm.  She has 2 puncture wounds.  She complains of some vague subjective numbness but denies any other injuries or complaints.  Tetanus is up-to-date.    PAST MEDICAL HISTORY   has a past medical history of Asthma, Biliary dyskinesia (12/23/2019), Depression, Gynecological disorder, History of colonoscopy (9/17/2015), Intractable migraine without status migrainosus (8/11/2015), Labor and delivery, indication for care (2/13/2022), and Pain (12/20/2019).    SURGICAL HISTORY   has a past surgical history that includes marilin by laparoscopy (N/A, 12/23/2019); colonoscopy (2013); cholecystectomy (2019); hysteroscopy,dx,sep proc (N/A, 5/11/2021); lap,diagnostic abdomen (N/A, 5/11/2021); and primary c section (Bilateral, 2/14/2022).    FAMILY HISTORY  Family History   Problem Relation Age of Onset    Alcohol/Drug Father     Alcohol/Drug Brother        SOCIAL HISTORY  Social History     Tobacco Use    Smoking status: Never    Smokeless tobacco: Never   Vaping Use    Vaping Use: Never used   Substance and Sexual Activity    Alcohol use: Not Currently     Comment: rare    Drug use: Not Currently     Frequency: 7.0 times per week     Types: Marijuana, Inhaled, Oral     Comment: marijuana    Sexual activity: Never     Partners: Male     Birth control/protection: Pill       CURRENT MEDICATIONS  Home Medications       Reviewed by Lee Armas R.N. (Registered Nurse) on 08/04/23 at  2102  Med List Status: Not Addressed     Medication Last Dose Status   buPROPion (WELLBUTRIN XL) 150 MG XL tablet  Active   escitalopram (LEXAPRO) 10 MG Tab  Active                    ALLERGIES  No Known Allergies    PHYSICAL EXAM  VITAL SIGNS: /81   Pulse 82   Temp 36.5 °C (97.7 °F) (Temporal)   Resp 16   Ht 1.524 m (5')   Wt 62.8 kg (138 lb 7.2 oz)   LMP 08/04/2023   SpO2 100%   Breastfeeding No   BMI 27.04 kg/m²      Right upper extremity has a puncture wound on the lateral aspect of the right forearm.  Developed have a centimeter in length.  There is a laceration to the base of the thumb about 1 cm in length.  It stayed about the MCP joint and a little bit more medially.  Normal tendon function normal neurovascular exam.      DIAGNOSTIC STUDIES / PROCEDURES  None  RADIOLOGY  I have independently interpreted the diagnostic imaging associated with this visit and am waiting the final reading from the radiologist.   My preliminary interpretation is as follows: Reviewed the x-rays and I do not see any fractures or foreign bodies.  Radiologist interpretation:     DX-FOREARM RIGHT   Final Result      1.  Edema and soft tissue gas at the ulnar aspect distal RIGHT forearm consistent with penetrating injury.   2.  No radiopaque foreign body or associated fracture.      DX-HAND 3+ RIGHT   Final Result      Unremarkable RIGHT hand.            COURSE & MEDICAL DECISION MAKING    ED Observation Status? No; Patient does not meet criteria for ED Observation.     INITIAL ASSESSMENT, COURSE AND PLAN  Care Narrative:     The patient is a dog bite or 2 dog bites right upper extremity puncture wounds.  Tetanus up-to-date.  He is given a dose of Augmentin x-rays obtained to exclude fracture I do not see any fractures.    The wounds are injected with 2% lidocaine without epinephrine for anesthesia and then copiously irrigated by the nursing staff.    After the dressing will be applied.    The patient will be discharged  home with return precautions and follow-up.  She will be prescribed Augmentin for 7 days.  Return for pain, swelling, redness or other concerns.  Questions were answered she is agreeable plan she is discharged in good condition.            DISPOSITION AND DISCUSSIONS  KRISTINE Cruz.  [address]  [facility] [address]  [phone]                FINAL DIAGNOSIS  1. Dog bite, initial encounter    2. Dog bite of right hand, initial encounter           Electronically signed by: Sandip Gonzalez M.D., 8/4/2023 9:16 PM

## 2023-08-05 NOTE — DISCHARGE INSTRUCTIONS
Keep wounds clean and dry.  Watch for signs of infection.  Return for pain, swelling, redness, fever or other concerns.  Follow-up with your doctor.

## 2023-10-19 ENCOUNTER — PATIENT MESSAGE (OUTPATIENT)
Dept: HEALTH INFORMATION MANAGEMENT | Facility: OTHER | Age: 28
End: 2023-10-19

## 2023-10-19 ENCOUNTER — DOCUMENTATION (OUTPATIENT)
Dept: HEALTH INFORMATION MANAGEMENT | Facility: OTHER | Age: 28
End: 2023-10-19
Payer: COMMERCIAL

## 2024-01-17 ENCOUNTER — OFFICE VISIT (OUTPATIENT)
Dept: OBGYN | Facility: CLINIC | Age: 29
End: 2024-01-17

## 2024-01-17 ENCOUNTER — HOSPITAL ENCOUNTER (OUTPATIENT)
Facility: MEDICAL CENTER | Age: 29
End: 2024-01-17
Attending: OBSTETRICS & GYNECOLOGY
Payer: COMMERCIAL

## 2024-01-17 ENCOUNTER — TELEPHONE (OUTPATIENT)
Dept: OBGYN | Facility: CLINIC | Age: 29
End: 2024-01-17

## 2024-01-17 VITALS
BODY MASS INDEX: 27.68 KG/M2 | HEIGHT: 60 IN | WEIGHT: 141 LBS | DIASTOLIC BLOOD PRESSURE: 60 MMHG | SYSTOLIC BLOOD PRESSURE: 102 MMHG

## 2024-01-17 DIAGNOSIS — R10.2 PELVIC PAIN: Primary | ICD-10-CM

## 2024-01-17 DIAGNOSIS — Z01.419 WOMEN'S ANNUAL ROUTINE GYNECOLOGICAL EXAMINATION: ICD-10-CM

## 2024-01-17 PROCEDURE — 88175 CYTOPATH C/V AUTO FLUID REDO: CPT

## 2024-01-17 PROCEDURE — 99203 OFFICE O/P NEW LOW 30 MIN: CPT | Performed by: OBSTETRICS & GYNECOLOGY

## 2024-01-17 PROCEDURE — 3074F SYST BP LT 130 MM HG: CPT | Performed by: OBSTETRICS & GYNECOLOGY

## 2024-01-17 PROCEDURE — 3078F DIAST BP <80 MM HG: CPT | Performed by: OBSTETRICS & GYNECOLOGY

## 2024-01-17 NOTE — PROGRESS NOTES
Patient here for annual exam. Wants to discuss endometriosis flare ups. Having pain with intercourse  Last pap done/result: 9/18/2018=negative  LMP: n/a  BCM: nexplanon  placed 03/2022  Phone number: [phone]  Pharmacy verified

## 2024-01-18 NOTE — PROGRESS NOTES
"PROBLEM GYNECOLOGY VISIT    Chief Complaint  Endometriosis    Subjective  Devehn Beata Bull is a 28 y.o. female who presents today for Pap smear and to discuss endometriosis.  She was previously diagnosed with endometriosis by Dr. Baldwin by operative laparoscopy.  She had a biopsy during the procedure that was sent to pathology and positive for endometriosis.  After the procedure, she was able to conceive.  Currently, she has a Nexplanon in place the Nexplanon was placed March 2022.  Her cycles are regular with the Nexplanon, but more recently they have been very heavy and prolonged lasting at least a week. Her cycles are very painful and she attributes this to endometriosis.  She also has more pelvic pain on the left side.  She is unsure if she has an ovarian cyst.  The pain is pretty constant and feels like \"pressure.\"  She has tried oral contraceptive pills in the past for her cycles and these have made her cycles regular and shorter.  Also, she has tried Depo-Provera in the past, but this is caused weight gain and she is not interested in trying Depo-Provera.  She also complains of dyspareunia with deep penetration and she attributes this to endometriosis.    Preventive Care   Immunization History   Administered Date(s) Administered    Dtap Vaccine 1994, 02/22/1995, 04/24/1995, 01/30/1996    HIB Vaccine (ACTHIB/HIBERIX) 1994, 02/22/1995, 04/24/1995, 01/30/1996    HPV Quadrivalent Vaccine (GARDASIL) - HISTORICAL DATA 05/22/2007, 07/17/2007, 08/23/2011    Hepatitis A Vaccine, Ped/Adol 04/25/1999, 03/07/2007    Hepatitis B Vaccine (Adol/Adult) 1994, 07/25/1995, 11/17/2006    Hepatitis B Vaccine Adolescent/Pediatric 1994, 07/25/1995, 11/17/2006    IPV 11/17/2006    Influenza Vaccine Quad Inj (Pf) 09/18/2018    MMR Vaccine 10/28/1995, 11/17/2006    MMR/Varicella Combined Vaccine 11/17/2006    Meningococcal Conjugate Vaccine MCV4 (MENVEO) 08/23/2011    OPV TRIVALENT - HISTORICAL DATA " (GIVEN PRIOR TO MAY 2016) 1994, 1995, 1995    TD Vaccine 05/10/2006    Tdap Vaccine 2011, 2023    Varicella Vaccine Live 2006, 2007     Last Mammogram: NA    Gynecology History and ROS  Current Sexual Activity: Yes  History of sexually transmitted diseases?  no  Abnormal discharge? No  Current Contraception:  Nexplanon    Pap History  Last pap smear: 2018 neg  History of moderate or severe dysplasia: No    Obstetric History  OB History    Para Term  AB Living   1 1 1     1   SAB IAB Ectopic Molar Multiple Live Births           0 1      # Outcome Date GA Lbr Paul/2nd Weight Sex Delivery Anes PTL Lv   1 Term 22 39w6d / 03:29 6 lb 14.4 oz F CS-LTranv EPI N MAYUR      Complications: Fetal Intolerance       Past Medical History  Past Medical History:   Diagnosis Date    Anxiety     Asthma     Biliary dyskinesia 2019    Depression     anxiety    Gynecological disorder     endometreosis    History of colonoscopy 2015    2012     Intractable migraine without status migrainosus 2015    Labor and delivery, indication for care 2022    Pain 2019    abd., 3-4/10       Past Surgical History  Past Surgical History:   Procedure Laterality Date    PRIMARY C SECTION Bilateral 2022    Procedure:  SECTION, PRIMARY;  Surgeon: Brendan Baldwin M.D.;  Location: SURGERY LABOR AND DELIVERY;  Service: Labor and Delivery    [place] HYSTEROSCOPY,DX,SEP PROC N/A 2021    Procedure: HYSTEROSCOPY, DIAGNOSTIC;  Surgeon: Brendan Baldwin M.D.;  Location: SURGERY SAME DAY [facility];  Service: Gynecology    [place] LAP,DIAGNOSTIC ABDOMEN N/A 2021    Procedure: PELVISCOPY - DIAGNOSTIC,  OPERATIVE, FULGRATION OF ENDOMETRIOSIS,;  Surgeon: Brendan Baldwin M.D.;  Location: SURGERY SAME DAY [facility];  Service: Gynecology    TRISH BY LAPAROSCOPY N/A 2019    Procedure: CHOLECYSTECTOMY, LAPAROSCOPIC;  Surgeon: Kar Jacobsen M.D.;  Location:  SURGERY SAME DAY [facility];  Service: General    CHOLECYSTECTOMY  2019    COLONOSCOPY  2013       Social History  Social History     Socioeconomic History    Marital status:      Spouse name: Not on file    Number of children: Not on file    Years of education: Not on file    Highest education level: Associate degree: occupational, technical, or vocational program   Occupational History    Not on file   Tobacco Use    Smoking status: Never    Smokeless tobacco: Never   Vaping Use    Vaping Use: Never used   Substance and Sexual Activity    Alcohol use: Yes     Comment: rare    Drug use: Yes     Frequency: 7.0 times per week     Types: Marijuana, Inhaled, Oral     Comment: marijuana    Sexual activity: Yes     Partners: Male     Birth control/protection: Implant   Other Topics Concern    Not on file   Social History Narrative    Not on file     Social Determinants of Health     Financial Resource Strain: Low Risk  (4/12/2023)    Overall Financial Resource Strain (CARDIA)     Difficulty of Paying Living Expenses: Not very hard   Food Insecurity: No Food Insecurity (4/12/2023)    Hunger Vital Sign     Worried About Running Out of Food in the Last Year: Never true     Ran Out of Food in the Last Year: Never true   Transportation Needs: No Transportation Needs (4/12/2023)    PRAPARE - Transportation     Lack of Transportation (Medical): No     Lack of Transportation (Non-Medical): No   Physical Activity: Insufficiently Active (4/12/2023)    Exercise Vital Sign     Days of Exercise per Week: 4 days     Minutes of Exercise per Session: 20 min   Stress: Stress Concern Present (4/12/2023)    [place] [employer] - Occupational Stress Questionnaire     Feeling of Stress : Very much   Social Connections: Moderately Isolated (4/12/2023)    Social Connection and Isolation Panel [NHANES]     Frequency of Communication with Friends and Family: Three times a week     Frequency of Social Gatherings with  Friends and Family: Once a week     Attends [employer] Services: Never     Active Member of Clubs or Organizations: No     Attends Club or Organization Meetings: Never     Marital Status:    Intimate Partner Violence: Not on file   Housing Stability: Unknown (4/12/2023)    Housing Stability Vital Sign     Unable to Pay for Housing in the Last Year: No     Number of Places Lived in the Last Year: Not on file     Unstable Housing in the Last Year: No       Family History  Family History   Problem Relation Age of Onset    Alcohol/Drug Father     Alcohol/Drug Brother     Hypertension Maternal Grandfather        Home Medications  Current Outpatient Medications on File Prior to Visit   Medication Sig Dispense Refill    buPROPion (WELLBUTRIN XL) 150 MG XL tablet Take 1 Tablet by mouth every morning. 90 Tablet 3    escitalopram (LEXAPRO) 10 MG Tab Take 1 Tablet by mouth every day. 90 Tablet 3     No current facility-administered medications on file prior to visit.       Allergies/Reactions  No Known Allergies    Physical Examination:  Vital Signs:   Vitals:    01/17/24 0857   BP: 102/60   BP Location: Left arm   Patient Position: Sitting   BP Cuff Size: Adult   Weight: 141 lb   Height: 5'     Body mass index is 27.54 kg/m².    Constitutional: The patient is well developed and well nourished.  Psychiatric: Patient is oriented to time place and person.   Skin: No rash observed.  Neck: Appears symmetric.   Respiratory: normal effort  Breast: Deferred  Abdomen: Soft, non-tender.  Pelvic Exam:     Vulva: external female genitalia are normal in appearance. No lesions    Urethra - no lesions, no erythema    Vagina: moist, pink, normal ruggae    Cervix: pink, smooth, no lesions    Uterus - non-tender, normal size, shape, contour, mobile    Ovaries: non-tender, no appreciable masses  Pap Smear performed: Yes  Extremeties: Legs are symmetric and without tenderness. There is no edema present.    Assessment & Plan  Sulema Cota  Mahin Bull is a 28 y.o. female who presents today for pelvic pain, discuss endometriosis and pap smear    1. Pelvic pain/dyspareunia  - Most likely due to endometriosis.  Pelvic ultrasound ordered.  She is also complaining of pelvic pain on the left side.  She may have an endometrioma.  - If she has an endometrioma, will consider surgical management.  After surgical management, we could consider [facility] as an option.  - US-PELVIC COMPLETE (TRANSABDOMINAL/TRANSVAGINAL) (COMBO); Future     2. Women's annual routine gynecological examination  - THINPREP PAP,REFLEX HPV ON ASC-US ONLY; Future    -Pelvic exam completed  -Pap: collected  -Nexplanon for contraception    Return: After pelvic US to review results    Shayna Adair M.D.

## 2024-01-19 LAB
COMMENT NL11729A: NORMAL
CYTOLOGIST CVX/VAG CYTO: NORMAL
CYTOLOGY CVX/VAG DOC CYTO: NORMAL
CYTOLOGY CVX/VAG DOC THIN PREP: NORMAL
NOTE NL11727A: NORMAL
OTHER STN SPEC: NORMAL
STAT OF ADQ CVX/VAG CYTO-IMP: NORMAL

## 2024-02-07 ENCOUNTER — HOSPITAL ENCOUNTER (OUTPATIENT)
Dept: RADIOLOGY | Facility: MEDICAL CENTER | Age: 29
End: 2024-02-07
Attending: OBSTETRICS & GYNECOLOGY
Payer: COMMERCIAL

## 2024-02-07 DIAGNOSIS — R10.2 PELVIC PAIN: ICD-10-CM

## 2024-02-07 PROCEDURE — 76830 TRANSVAGINAL US NON-OB: CPT

## 2024-02-08 ENCOUNTER — TELEPHONE (OUTPATIENT)
Dept: OBGYN | Facility: CLINIC | Age: 29
End: 2024-02-08
Payer: COMMERCIAL

## 2024-02-08 NOTE — TELEPHONE ENCOUNTER
----- Message from Shayna Adair M.D. sent at 2/7/2024  4:18 PM PST -----  Pelvic US is normal, only simple cysts were seen. No endometriomas were seen. Cysts are small, but could possibly be causing pain. Let me know if you want to come into the office to discuss any options for pelvic pain. Thanks.    2/8/2024 1003  Left message for pt to call back regarding US results.     1030  Pt called back and informed as above. Scheduled appt for 3/20/2024 at 0900 with Dr Adair to discuss options for pelvis pain. Pt agreed and verbalized understanding.

## 2024-02-14 ENCOUNTER — TELEPHONE (OUTPATIENT)
Dept: HEALTH INFORMATION MANAGEMENT | Facility: OTHER | Age: 29
End: 2024-02-14

## 2024-09-30 ENCOUNTER — NON-PROVIDER VISIT (OUTPATIENT)
Dept: URGENT CARE | Facility: PHYSICIAN GROUP | Age: 29
End: 2024-09-30

## 2024-09-30 ENCOUNTER — APPOINTMENT (OUTPATIENT)
Dept: URGENT CARE | Facility: PHYSICIAN GROUP | Age: 29
End: 2024-09-30

## 2024-09-30 DIAGNOSIS — Z11.1 PPD SCREENING TEST: Primary | ICD-10-CM

## 2024-09-30 NOTE — PROGRESS NOTES
Sulema Bull is a 28 y.o. female here for a non-provider visit for PPD placement -- Step 1 of 1    Reason for PPD:  work requirement    1. TB evaluation questionnaire completed by patient? Yes      -  If any answers marked yes did you contact a provider prior to placing? Not Indicated  2.  Patient notified to return to clinic for reading on: Wednesday 9/2/24 after 12:14 pm or Thursday 9/3/24 before 12:14 pm   3.  PPD Placement documentation completed on TB evaluation questionnaire? Yes  4.  Location of TB evaluation questionnaire filed:

## 2024-10-02 ENCOUNTER — NON-PROVIDER VISIT (OUTPATIENT)
Dept: URGENT CARE | Facility: PHYSICIAN GROUP | Age: 29
End: 2024-10-02

## 2024-10-02 LAB — TB WHEAL 3D P 5 TU DIAM: 0 MM

## 2024-12-05 ENCOUNTER — APPOINTMENT (OUTPATIENT)
Dept: RADIOLOGY | Facility: MEDICAL CENTER | Age: 29
End: 2024-12-05
Attending: EMERGENCY MEDICINE

## 2024-12-05 ENCOUNTER — HOSPITAL ENCOUNTER (EMERGENCY)
Facility: MEDICAL CENTER | Age: 29
End: 2024-12-05
Attending: EMERGENCY MEDICINE

## 2024-12-05 VITALS
BODY MASS INDEX: 24.63 KG/M2 | SYSTOLIC BLOOD PRESSURE: 124 MMHG | OXYGEN SATURATION: 97 % | DIASTOLIC BLOOD PRESSURE: 68 MMHG | WEIGHT: 126.1 LBS | TEMPERATURE: 97.6 F | RESPIRATION RATE: 16 BRPM | HEART RATE: 79 BPM

## 2024-12-05 DIAGNOSIS — R07.9 CHEST PAIN, UNSPECIFIED TYPE: ICD-10-CM

## 2024-12-05 DIAGNOSIS — F41.9 ANXIETY: ICD-10-CM

## 2024-12-05 DIAGNOSIS — F41.0 PANIC ATTACK: ICD-10-CM

## 2024-12-05 LAB
ALBUMIN SERPL BCP-MCNC: 4.5 G/DL (ref 3.2–4.9)
ALBUMIN/GLOB SERPL: 1.5 G/DL
ALP SERPL-CCNC: 51 U/L (ref 30–99)
ALT SERPL-CCNC: 13 U/L (ref 2–50)
ANION GAP SERPL CALC-SCNC: 11 MMOL/L (ref 7–16)
AST SERPL-CCNC: 16 U/L (ref 12–45)
BASOPHILS # BLD AUTO: 0.5 % (ref 0–1.8)
BASOPHILS # BLD: 0.05 K/UL (ref 0–0.12)
BILIRUB SERPL-MCNC: 0.8 MG/DL (ref 0.1–1.5)
BUN SERPL-MCNC: 12 MG/DL (ref 8–22)
CALCIUM ALBUM COR SERPL-MCNC: 9 MG/DL (ref 8.5–10.5)
CALCIUM SERPL-MCNC: 9.4 MG/DL (ref 8.5–10.5)
CHLORIDE SERPL-SCNC: 103 MMOL/L (ref 96–112)
CO2 SERPL-SCNC: 21 MMOL/L (ref 20–33)
CREAT SERPL-MCNC: 0.61 MG/DL (ref 0.5–1.4)
EKG IMPRESSION: NORMAL
EOSINOPHIL # BLD AUTO: 0.01 K/UL (ref 0–0.51)
EOSINOPHIL NFR BLD: 0.1 % (ref 0–6.9)
ERYTHROCYTE [DISTWIDTH] IN BLOOD BY AUTOMATED COUNT: 43.3 FL (ref 35.9–50)
GFR SERPLBLD CREATININE-BSD FMLA CKD-EPI: 123 ML/MIN/1.73 M 2
GLOBULIN SER CALC-MCNC: 3 G/DL (ref 1.9–3.5)
GLUCOSE SERPL-MCNC: 97 MG/DL (ref 65–99)
HCG SERPL QL: NEGATIVE
HCT VFR BLD AUTO: 43.8 % (ref 37–47)
HGB BLD-MCNC: 15.1 G/DL (ref 12–16)
IMM GRANULOCYTES # BLD AUTO: 0.02 K/UL (ref 0–0.11)
IMM GRANULOCYTES NFR BLD AUTO: 0.2 % (ref 0–0.9)
LYMPHOCYTES # BLD AUTO: 1.76 K/UL (ref 1–4.8)
LYMPHOCYTES NFR BLD: 17.5 % (ref 22–41)
MAGNESIUM SERPL-MCNC: 2 MG/DL (ref 1.5–2.5)
MCH RBC QN AUTO: 31.8 PG (ref 27–33)
MCHC RBC AUTO-ENTMCNC: 34.5 G/DL (ref 32.2–35.5)
MCV RBC AUTO: 92.2 FL (ref 81.4–97.8)
MONOCYTES # BLD AUTO: 0.5 K/UL (ref 0–0.85)
MONOCYTES NFR BLD AUTO: 5 % (ref 0–13.4)
NEUTROPHILS # BLD AUTO: 7.73 K/UL (ref 1.82–7.42)
NEUTROPHILS NFR BLD: 76.7 % (ref 44–72)
NRBC # BLD AUTO: 0 K/UL
NRBC BLD-RTO: 0 /100 WBC (ref 0–0.2)
PLATELET # BLD AUTO: 369 K/UL (ref 164–446)
PMV BLD AUTO: 8.4 FL (ref 9–12.9)
POTASSIUM SERPL-SCNC: 3.6 MMOL/L (ref 3.6–5.5)
PROT SERPL-MCNC: 7.5 G/DL (ref 6–8.2)
RBC # BLD AUTO: 4.75 M/UL (ref 4.2–5.4)
SODIUM SERPL-SCNC: 135 MMOL/L (ref 135–145)
WBC # BLD AUTO: 10.1 K/UL (ref 4.8–10.8)

## 2024-12-05 PROCEDURE — 80053 COMPREHEN METABOLIC PANEL: CPT

## 2024-12-05 PROCEDURE — 700105 HCHG RX REV CODE 258: Performed by: EMERGENCY MEDICINE

## 2024-12-05 PROCEDURE — 93005 ELECTROCARDIOGRAM TRACING: CPT | Mod: TC | Performed by: EMERGENCY MEDICINE

## 2024-12-05 PROCEDURE — 71045 X-RAY EXAM CHEST 1 VIEW: CPT

## 2024-12-05 PROCEDURE — 96374 THER/PROPH/DIAG INJ IV PUSH: CPT

## 2024-12-05 PROCEDURE — 36415 COLL VENOUS BLD VENIPUNCTURE: CPT

## 2024-12-05 PROCEDURE — 83735 ASSAY OF MAGNESIUM: CPT

## 2024-12-05 PROCEDURE — 84703 CHORIONIC GONADOTROPIN ASSAY: CPT

## 2024-12-05 PROCEDURE — 93005 ELECTROCARDIOGRAM TRACING: CPT | Mod: TC

## 2024-12-05 PROCEDURE — 99284 EMERGENCY DEPT VISIT MOD MDM: CPT

## 2024-12-05 PROCEDURE — 700111 HCHG RX REV CODE 636 W/ 250 OVERRIDE (IP): Performed by: EMERGENCY MEDICINE

## 2024-12-05 PROCEDURE — A9270 NON-COVERED ITEM OR SERVICE: HCPCS | Performed by: EMERGENCY MEDICINE

## 2024-12-05 PROCEDURE — 85025 COMPLETE CBC W/AUTO DIFF WBC: CPT

## 2024-12-05 PROCEDURE — 96375 TX/PRO/DX INJ NEW DRUG ADDON: CPT

## 2024-12-05 PROCEDURE — 700102 HCHG RX REV CODE 250 W/ 637 OVERRIDE(OP): Performed by: EMERGENCY MEDICINE

## 2024-12-05 RX ORDER — LORAZEPAM 2 MG/ML
1 INJECTION INTRAMUSCULAR ONCE
Status: COMPLETED | OUTPATIENT
Start: 2024-12-05 | End: 2024-12-05

## 2024-12-05 RX ORDER — SODIUM CHLORIDE 9 MG/ML
1000 INJECTION, SOLUTION INTRAVENOUS ONCE
Status: COMPLETED | OUTPATIENT
Start: 2024-12-05 | End: 2024-12-05

## 2024-12-05 RX ORDER — ONDANSETRON 2 MG/ML
4 INJECTION INTRAMUSCULAR; INTRAVENOUS ONCE
Status: COMPLETED | OUTPATIENT
Start: 2024-12-05 | End: 2024-12-05

## 2024-12-05 RX ORDER — HYDROXYZINE HYDROCHLORIDE 25 MG/1
25 TABLET, FILM COATED ORAL 3 TIMES DAILY PRN
Qty: 20 TABLET | Refills: 0 | Status: SHIPPED | OUTPATIENT
Start: 2024-12-05

## 2024-12-05 RX ADMIN — ONDANSETRON 4 MG: 2 INJECTION INTRAMUSCULAR; INTRAVENOUS at 09:09

## 2024-12-05 RX ADMIN — LORAZEPAM 1 MG: 2 INJECTION INTRAMUSCULAR; INTRAVENOUS at 09:34

## 2024-12-05 RX ADMIN — LIDOCAINE HYDROCHLORIDE 30 ML: 20 SOLUTION ORAL; TOPICAL at 09:10

## 2024-12-05 RX ADMIN — SODIUM CHLORIDE 1000 ML: 9 INJECTION, SOLUTION INTRAVENOUS at 09:09

## 2024-12-05 ASSESSMENT — PAIN DESCRIPTION - PAIN TYPE
TYPE: ACUTE PAIN
TYPE: ACUTE PAIN

## 2024-12-05 NOTE — ED TRIAGE NOTES
.Sulema Bull  .  Chief Complaint   Patient presents with    Chest Pain     Patient c/o mid substernal chest pain radiating to both arms x 2 days.     Anxiety     Patient reports hx of anxiety, pt states recent stressors in life.      Patient ambulatory to triage with above complaints.   EKG completed prior to triage.     Patient to lobby and instructed to inform staff of any needs.

## 2024-12-05 NOTE — ED PROVIDER NOTES
ER Provider Note    Scribed for Peng Chambers M.D. by Levy Sanchez. 2024   7:48 AM    Primary Care Provider: Pcp Pt States None    CHIEF COMPLAINT  Chief Complaint   Patient presents with    Chest Pain     Patient c/o mid substernal chest pain radiating to both arms x 2 days.     Anxiety     Patient reports hx of anxiety, pt states recent stressors in life.      EXTERNAL RECORDS REVIEWED      HPI/ROS  LIMITATION TO HISTORY   Select: : None  OUTSIDE HISTORIAN(S):  Parent Mother is present at bedside and helped the patient provide history today.    Sulema Bull is a 29 y.o. female who presents to the ED for evaluation of chest pain and anxiety onset two days ago. Patient reports that she has a history of anxiety and was taking lexapro 10 mg daily until two months ago when she stopped cold turkey due to insurence coverage issues. Two days ago, she decided to take one dose of her lexapro and began having tinlging to her fingers and felt flushed. She also had nausea, vomiting, and diarrhea for the past two days. Patient locates her current chest pain to the upper chest wall and notes pain radiation into her shoulders. She did not take any more lexapro after the onset of her symptoms. Mother reports that patient has lost about 60 pounds over the past few months. Patient states that she has decreased appeitie.       PAST MEDICAL HISTORY  Past Medical History:   Diagnosis Date    Anxiety     Asthma     Biliary dyskinesia 2019    Depression     anxiety    Gynecological disorder     endometreosis    History of colonoscopy 2015    2012     Intractable migraine without status migrainosus 2015    Labor and delivery, indication for care 2022    Pain 2019    abd., 3-4/10       SURGICAL HISTORY  Past Surgical History:   Procedure Laterality Date    PRIMARY C SECTION Bilateral 2022    Procedure:  SECTION, PRIMARY;  Surgeon: Brendan Baldwin M.D.;  Location: SURGERY  LABOR AND DELIVERY;  Service: Labor and Delivery    FL HYSTEROSCOPY,DX,SEP PROC N/A 5/11/2021    Procedure: HYSTEROSCOPY, DIAGNOSTIC;  Surgeon: Brendan Baldwin M.D.;  Location: SURGERY SAME DAY [facility];  Service: Gynecology    FL LAP,DIAGNOSTIC ABDOMEN N/A 5/11/2021    Procedure: PELVISCOPY - DIAGNOSTIC,  OPERATIVE, FULGRATION OF ENDOMETRIOSIS,;  Surgeon: Brendan Baldwin M.D.;  Location: SURGERY SAME DAY [facility];  Service: Gynecology    TRISH BY LAPAROSCOPY N/A 12/23/2019    Procedure: CHOLECYSTECTOMY, LAPAROSCOPIC;  Surgeon: Kar Jacobsen M.D.;  Location: SURGERY SAME DAY [facility];  Service: General    CHOLECYSTECTOMY  2019    COLONOSCOPY  2013       FAMILY HISTORY  Family History   Problem Relation Age of Onset    Alcohol/Drug Father     Alcohol/Drug Brother     Hypertension Maternal Grandfather        SOCIAL HISTORY   reports that she has never smoked. She has never used smokeless tobacco. She reports that she does not currently use alcohol. She reports current drug use. Frequency: 7.00 times per week. Drugs: Marijuana, Inhaled, and Oral.    CURRENT MEDICATIONS  Previous Medications    BUPROPION (WELLBUTRIN XL) 150 MG XL TABLET    Take 1 Tablet by mouth every morning.    ESCITALOPRAM (LEXAPRO) 10 MG TAB    Take 1 Tablet by mouth every day.       ALLERGIES  No Known Allergies     PHYSICAL EXAM  BP (!) 149/96   Pulse 90   Temp 36.4 °C (97.5 °F) (Temporal)   Resp 16   Wt 57.2 kg (126 lb 1.7 oz)   SpO2 99%   BMI 24.63 kg/m²    Nursing note and vitals reviewed.  Constitutional: Well-developed and well-nourished. Anxious.   HENT: Head is normocephalic and atraumatic. Oropharynx is clear and moist without exudate or erythema.   Eyes: Pupils are equal, round, and reactive to light. Conjunctiva are normal.   Cardiovascular: Normal rate and regular rhythm. No murmur heard. Normal radial pulses.  Pulmonary/Chest: Breath sounds normal. No wheezes or rales.   Abdominal: Soft and non-tender. No distention     Musculoskeletal: Extremities exhibit normal range of motion without edema or tenderness.   Neurological: Awake, alert and oriented to person, place, and time. No focal deficits noted.  Skin: Skin is warm and dry. No rash.   Psychiatric: Normal mood and affect. Appropriate for clinical situation.    DIAGNOSTIC STUDIES    Labs:   Results for orders placed or performed during the hospital encounter of 24   EKG    Collection Time: 24  7:15 AM   Result Value Ref Range    Report       [facility] Emergency Dept.    Test Date:  2024  Pt Name:    GRIS SCHMID           Department: ER  MRN:        [id number]                      Room:  Gender:     Female                       Technician: [id number]  :        1995-10-                   Requested By:ER TRIAGE PROTOCOL  Order #:    [id number]                    Reading MD: REY LOZANO MD    Measurements  Intervals                                Axis  Rate:       76                           P:          34  TX:         166                          QRS:        7  QRSD:       85                           T:          37  QT:         384  QTc:        432    Interpretive Statements  Sinus rhythm  Compared to ECG 2014 03:55:19  Sinus tachycardia no longer present  Electronically Signed On 2024 07:50:15 PST by REY LOZANO MD     CBC WITH DIFFERENTIAL    Collection Time: 24  8:40 AM   Result Value Ref Range    WBC 10.1 4.8 - 10.8 K/uL    RBC 4.75 4.20 - 5.40 M/uL    Hemoglobin 15.1 12.0 - 16.0 g/dL    Hematocrit 43.8 37.0 - 47.0 %    MCV 92.2 81.4 - 97.8 fL    MCH 31.8 27.0 - 33.0 pg    MCHC 34.5 32.2 - 35.5 g/dL    RDW 43.3 35.9 - 50.0 fL    Platelet Count 369 164 - 446 K/uL    MPV 8.4 (L) 9.0 - 12.9 fL    Neutrophils-Polys 76.70 (H) 44.00 - 72.00 %    Lymphocytes 17.50 (L) 22.00 - 41.00 %    Monocytes 5.00 0.00 - 13.40 %    Eosinophils 0.10 0.00 - 6.90 %    Basophils 0.50 0.00 - 1.80 %    Immature Granulocytes 0.20  0.00 - 0.90 %    Nucleated RBC 0.00 0.00 - 0.20 /100 WBC    Neutrophils (Absolute) 7.73 (H) 1.82 - 7.42 K/uL    Lymphs (Absolute) 1.76 1.00 - 4.80 K/uL    Monos (Absolute) 0.50 0.00 - 0.85 K/uL    Eos (Absolute) 0.01 0.00 - 0.51 K/uL    Baso (Absolute) 0.05 0.00 - 0.12 K/uL    Immature Granulocytes (abs) 0.02 0.00 - 0.11 K/uL    NRBC (Absolute) 0.00 K/uL   COMP METABOLIC PANEL    Collection Time: 12/05/24  8:40 AM   Result Value Ref Range    Sodium 135 135 - 145 mmol/L    Potassium 3.6 3.6 - 5.5 mmol/L    Chloride 103 96 - 112 mmol/L    Co2 21 20 - 33 mmol/L    Anion Gap 11.0 7.0 - 16.0    Glucose 97 65 - 99 mg/dL    Bun 12 8 - 22 mg/dL    Creatinine 0.61 0.50 - 1.40 mg/dL    Calcium 9.4 8.5 - 10.5 mg/dL    Correct Calcium 9.0 8.5 - 10.5 mg/dL    AST(SGOT) 16 12 - 45 U/L    ALT(SGPT) 13 2 - 50 U/L    Alkaline Phosphatase 51 30 - 99 U/L    Total Bilirubin 0.8 0.1 - 1.5 mg/dL    Albumin 4.5 3.2 - 4.9 g/dL    Total Protein 7.5 6.0 - 8.2 g/dL    Globulin 3.0 1.9 - 3.5 g/dL    A-G Ratio 1.5 g/dL   MAGNESIUM    Collection Time: 12/05/24  8:40 AM   Result Value Ref Range    Magnesium 2.0 1.5 - 2.5 mg/dL   HCG QUAL SERUM    Collection Time: 12/05/24  8:40 AM   Result Value Ref Range    Beta-Hcg Qualitative Serum Negative Negative   ESTIMATED GFR    Collection Time: 12/05/24  8:40 AM   Result Value Ref Range    GFR (CKD-EPI) 123 >60 mL/min/1.73 m 2       EKG:   I have independently interpreted this EKG as detailed above.    Radiology:   This attending emergency physician has independently interpreted the diagnostic imaging associated with this visit and is awaiting the final reading from the radiologist.   Preliminary interpretation is a follows: No abnormalities appreciated.     Radiologist interpretation:   DX-CHEST-PORTABLE (1 VIEW)   Final Result      No acute cardiopulmonary abnormality.           INITIAL ASSESSMENT AND PLAN    7:48 AM - Patient was evaluated at bedside. Patient arrives today with some upper chest pain  that radiates to her shoulders, and she has had two days of nausea, vomiting, and diarrhea after she took a dose of her lexapro after coming off of it two months ago. Ordered for EKG, CMP, CBC with differential, HCG qual serum, and Magnesium to evaluate. The patient will be medicated with Ativan,GI cocktail, Zofran, and NS infusion 1000 mL for her symptoms. Patient verbalizes understanding and support with my plan of care.  Differential diagnoses include but not limited to: Electrolyte abnormality, Hyperventilation, Anxiety, Chest wall pain, GERD, Arrhythmia.      10:23 AM - Upon reevaluation, the patient is feeling mildly improved following administration of Ativan. I informed her of her returned study results, which are normal. I discussed administration of hydroxyzine for control of her anxiety, and she agrees to this medication.  Heart score is low.  Will follow-up as an outpatient.    The patient was treated with Zofran for nausea.  Placed on a cardiac monitor to monitor for any arrhythmia associated with Zofran and QT prolongation.    DISPOSITION AND DISCUSSIONS    I have discussed management of the patient with the following physicians and JOLIE's:  None.     Discussion of management with other QHP or appropriate source(s): None     Barriers to care at this time, including but not limited to: Patient does not have established PCP.     Decision tools and prescription drugs considered including, but not limited to: PERC rule Negative and Atarax 25 mg .     The patient will return for new or worsening symptoms and is stable at the time of discharge.    DISPOSITION:  Patient will be discharged home in stable condition.    FOLLOW UP:  [facility], Emergency Dept  [address] [address] [phone]    If symptoms worsen      OUTPATIENT MEDICATIONS:  New Prescriptions    HYDROXYZINE HCL (ATARAX) 25 MG TAB    Take 1 Tablet by mouth 3 times a day as needed for Anxiety.        FINAL  DIAGNOSIS  1. Anxiety    2. Chest pain, unspecified type    3. Panic attack         Levy BARRETO (Scribe), am scribing for, and in the presence of, Pneg Chambers M.D..    Electronically signed by: Levy Sanchez (Scribe), 12/5/2024    Peng BARRETO M.D. personally performed the services described in this documentation, as scribed by Levy Sanchez in my presence, and it is both accurate and complete.      The note accurately reflects work and decisions made by me.  Peng Chambers M.D.  12/5/2024  1:14 PM

## 2025-03-10 ENCOUNTER — OFFICE VISIT (OUTPATIENT)
Dept: MEDICAL GROUP | Facility: PHYSICIAN GROUP | Age: 30
End: 2025-03-10
Payer: COMMERCIAL

## 2025-03-10 VITALS
OXYGEN SATURATION: 95 % | TEMPERATURE: 98.2 F | WEIGHT: 130.2 LBS | BODY MASS INDEX: 21.69 KG/M2 | HEIGHT: 65 IN | HEART RATE: 86 BPM

## 2025-03-10 DIAGNOSIS — F32.A ANXIETY AND DEPRESSION: ICD-10-CM

## 2025-03-10 DIAGNOSIS — F41.9 ANXIETY AND DEPRESSION: ICD-10-CM

## 2025-03-10 DIAGNOSIS — N80.9 ENDOMETRIOSIS: ICD-10-CM

## 2025-03-10 DIAGNOSIS — R53.83 OTHER FATIGUE: Primary | ICD-10-CM

## 2025-03-10 DIAGNOSIS — R63.4 WEIGHT LOSS, ABNORMAL: ICD-10-CM

## 2025-03-10 DIAGNOSIS — F43.21 GRIEF REACTION: ICD-10-CM

## 2025-03-10 DIAGNOSIS — F12.90 MARIJUANA USE: ICD-10-CM

## 2025-03-10 DIAGNOSIS — Z00.00 HEALTHCARE MAINTENANCE: ICD-10-CM

## 2025-03-10 DIAGNOSIS — R53.1 WEAKNESS: ICD-10-CM

## 2025-03-10 PROBLEM — F43.20 GRIEF REACTION: Status: ACTIVE | Noted: 2025-03-10

## 2025-03-10 PROBLEM — Z97.5 NEXPLANON IN PLACE: Status: RESOLVED | Noted: 2023-04-12 | Resolved: 2025-03-10

## 2025-03-10 PROCEDURE — 99214 OFFICE O/P EST MOD 30 MIN: CPT | Performed by: FAMILY MEDICINE

## 2025-03-10 RX ORDER — ESTRADIOL VALERATE AND ESTRADIOL VALERATE/DIENOGEST 3-2-1(28)
1 KIT ORAL
COMMUNITY
Start: 2025-02-11

## 2025-03-10 ASSESSMENT — PATIENT HEALTH QUESTIONNAIRE - PHQ9: CLINICAL INTERPRETATION OF PHQ2 SCORE: 0

## 2025-03-10 ASSESSMENT — FIBROSIS 4 INDEX: FIB4 SCORE: 0.36

## 2025-03-10 NOTE — PROGRESS NOTES
Verbal consent was acquired by the patient to use [employer] ambient listening note generation during this visit.    Subjective:     HPI:   History of Present Illness  The patient presents for the establishment of care.    She is currently taking oral contraceptives and reports no known drug allergies. She has no history of nicotine or tobacco use but admits to occasional cannabis use for sleep and relaxation. She is aware of her family history of addiction and is cautious about her substance use. The patient experienced a challenging year in , during which her alcohol consumption increased, but she has since abstained from alcohol. She discontinued Lexapro and Wellbutrin in 2024 and reports feeling well since then. She has a history of endometriosis and is seeking a referral to a gynecologist. She has not received her influenza vaccination this year and declines it today. She has completed the HPV vaccination series. The patient reports significant weight loss of 30 to 35 pounds attributed to grief following a close personal loss. She experiences feelings of weakness and low energy despite adequate sleep. She avoids energy drinks and prefers coffee. She expresses interest in individual therapy and couples counseling. She has not had a recent ophthalmologic evaluation and reports no visual issues. Her surgical history includes a  section, hysteroscopy, diagnostic laparoscopy, cholecystectomy, and colonoscopy. She gave birth in .    SOCIAL HISTORY  She reports no nicotine or tobacco use. She admits to smoking weed. She reports no other drug use. She reports no current alcohol use but mentions increased consumption during a difficult period last year.    FAMILY HISTORY  Her mother is relatively healthy but has struggled with weight issues and some pancreatic problems. Her father has a history of drug abuse, high blood pressure, and high cholesterol. Her maternal grandmother had lung cancer and a  "hysterectomy. Her maternal grandfather had kidney cancer and other unspecified cancers. Her brother has alcohol problems, and her younger brother was diagnosed with schizophrenia. Her older brother may also have schizophrenia but is not medically diagnosed.    ALLERGIES  The patient has no known allergies.    MEDICATIONS  Discontinued: Lexapro, Wellbutrin    IMMUNIZATIONS  She declined the influenza vaccine.    Health Maintenance: Completed    Objective:     Exam:  Pulse 86   Temp 36.8 °C (98.2 °F) (Temporal)   Ht 1.651 m (5' 5\")   Wt 59.1 kg (130 lb 3.2 oz)   SpO2 95%   BMI 21.67 kg/m²  Body mass index is 21.67 kg/m².    Physical Exam  Vitals reviewed.   Constitutional:       Appearance: Normal appearance.   HENT:      Head: Normocephalic and atraumatic.      Right Ear: External ear normal.      Left Ear: External ear normal.      Nose: Nose normal.   Cardiovascular:      Rate and Rhythm: Normal rate and regular rhythm.      Pulses: Normal pulses.      Heart sounds: Normal heart sounds. No murmur heard.  Pulmonary:      Effort: Pulmonary effort is normal. No respiratory distress.      Breath sounds: Normal breath sounds. No wheezing.   Abdominal:      General: Abdomen is flat.      Palpations: Abdomen is soft.   Skin:     General: Skin is warm and dry.   Neurological:      Mental Status: She is alert and oriented to person, place, and time.   Psychiatric:         Mood and Affect: Mood normal.         Behavior: Behavior normal.             Results      Assessment & Plan:     1. Marijuana use  CBC WITHOUT DIFFERENTIAL    TSH WITH REFLEX TO FT4    HEMOGLOBIN A1C    Lipid Profile    Comp Metabolic Panel      2. Anxiety and depression  CBC WITHOUT DIFFERENTIAL    TSH WITH REFLEX TO FT4    HEMOGLOBIN A1C    Lipid Profile    Comp Metabolic Panel    Referral to Behavioral Health      3. Endometriosis  CBC WITHOUT DIFFERENTIAL    TSH WITH REFLEX TO FT4    HEMOGLOBIN A1C    Lipid Profile    Comp Metabolic Panel    " Referral to Gynecology      4. Healthcare maintenance  CBC WITHOUT DIFFERENTIAL    TSH WITH REFLEX TO FT4    HEMOGLOBIN A1C    Lipid Profile    Comp Metabolic Panel      5. Grief reaction  Referral to Behavioral Health      6. Weight loss, abnormal  CBC WITHOUT DIFFERENTIAL    TSH WITH REFLEX TO FT4    HEMOGLOBIN A1C    Lipid Profile    Comp Metabolic Panel    VITAMIN B12    VITAMIN D,25 HYDROXY (DEFICIENCY)      7. Other fatigue  CBC WITHOUT DIFFERENTIAL    TSH WITH REFLEX TO FT4    HEMOGLOBIN A1C    Lipid Profile    Comp Metabolic Panel    VITAMIN B12    VITAMIN D,25 HYDROXY (DEFICIENCY)      8. Weakness  CBC WITHOUT DIFFERENTIAL    TSH WITH REFLEX TO FT4    HEMOGLOBIN A1C    Lipid Profile    Comp Metabolic Panel    VITAMIN B12    VITAMIN D,25 HYDROXY (DEFICIENCY)          Assessment & Plan  1. Health maintenance.  Her Pap smear conducted in 2024 yielded negative results. She has a history of ovarian cysts. Her BMI is within the normal range, albeit on the lower end. She has completed her HPV vaccinations. A comprehensive panel of laboratory tests will be ordered, including complete blood count, thyroid function, A1c, cholesterol, electrolytes, kidney function, liver function, vitamin D, and B12. She has been advised to fast for 8 to 10 hours prior to the lab work.  She has been encouraged to maintain regular dental check-ups every 6 months. She will inform us if she requires a refill of her oral contraceptives.     2. Endometriosis   A referral to a gynecologist will be made.    3. Grief Reaction   Hx of Anxiety and Depression  A referral to a behavioral health specialist will be made. Should there be any abnormalities in the lab results, a follow-up appointment will be scheduled to discuss potential treatment strategies.    4. Fatigue   Weakness   Weight loss   Recommended baseline labs in addition to vitamin D and B12.  Likely stemming from grief reaction.  Referral placed for behavioral health specialist for  therapy.  At this time patient would like to continue without medications but is very interested in therapy due to the loss of a close friend by suicide.    Follow-up  The patient will follow up as needed.    PROCEDURE  , hysteroscopy, diagnostic laparoscopy, cholecystectomy, and colonoscopy.      Return in about 1 year (around 3/10/2026), or if symptoms worsen or fail to improve, for Annual/wellness visit.    Please note that this dictation was created using voice recognition software. I have made every reasonable attempt to correct obvious errors, but I expect that there are errors of grammar and possibly content that I did not discover before finalizing the note.    Daphne Ortiz MD  Family Medicine and Non - Operative Sports Medicine   [facility] [place]

## 2025-03-24 ENCOUNTER — RESULTS FOLLOW-UP (OUTPATIENT)
Dept: MEDICAL GROUP | Facility: PHYSICIAN GROUP | Age: 30
End: 2025-03-24
Payer: COMMERCIAL

## 2025-03-24 ENCOUNTER — HOSPITAL ENCOUNTER (OUTPATIENT)
Dept: LAB | Facility: MEDICAL CENTER | Age: 30
End: 2025-03-24
Attending: FAMILY MEDICINE
Payer: COMMERCIAL

## 2025-03-24 DIAGNOSIS — R53.83 OTHER FATIGUE: ICD-10-CM

## 2025-03-24 DIAGNOSIS — F32.A ANXIETY AND DEPRESSION: ICD-10-CM

## 2025-03-24 DIAGNOSIS — N80.9 ENDOMETRIOSIS: ICD-10-CM

## 2025-03-24 DIAGNOSIS — R63.4 WEIGHT LOSS, ABNORMAL: ICD-10-CM

## 2025-03-24 DIAGNOSIS — F12.90 MARIJUANA USE: ICD-10-CM

## 2025-03-24 DIAGNOSIS — F41.9 ANXIETY AND DEPRESSION: ICD-10-CM

## 2025-03-24 DIAGNOSIS — Z00.00 HEALTHCARE MAINTENANCE: ICD-10-CM

## 2025-03-24 DIAGNOSIS — R53.1 WEAKNESS: ICD-10-CM

## 2025-03-24 LAB
25(OH)D3 SERPL-MCNC: 14 NG/ML (ref 30–100)
ALBUMIN SERPL BCP-MCNC: 4.1 G/DL (ref 3.2–4.9)
ALBUMIN/GLOB SERPL: 1.6 G/DL
ALP SERPL-CCNC: 35 U/L (ref 30–99)
ALT SERPL-CCNC: 9 U/L (ref 2–50)
ANION GAP SERPL CALC-SCNC: 9 MMOL/L (ref 7–16)
AST SERPL-CCNC: 19 U/L (ref 12–45)
BILIRUB SERPL-MCNC: 0.5 MG/DL (ref 0.1–1.5)
BUN SERPL-MCNC: 14 MG/DL (ref 8–22)
CALCIUM ALBUM COR SERPL-MCNC: 9 MG/DL (ref 8.5–10.5)
CALCIUM SERPL-MCNC: 9.1 MG/DL (ref 8.5–10.5)
CHLORIDE SERPL-SCNC: 109 MMOL/L (ref 96–112)
CHOLEST SERPL-MCNC: 173 MG/DL (ref 100–199)
CO2 SERPL-SCNC: 24 MMOL/L (ref 20–33)
CREAT SERPL-MCNC: 0.59 MG/DL (ref 0.5–1.4)
ERYTHROCYTE [DISTWIDTH] IN BLOOD BY AUTOMATED COUNT: 43.8 FL (ref 35.9–50)
EST. AVERAGE GLUCOSE BLD GHB EST-MCNC: 111 MG/DL
GFR SERPLBLD CREATININE-BSD FMLA CKD-EPI: 124 ML/MIN/1.73 M 2
GLOBULIN SER CALC-MCNC: 2.6 G/DL (ref 1.9–3.5)
GLUCOSE SERPL-MCNC: 87 MG/DL (ref 65–99)
HBA1C MFR BLD: 5.5 % (ref 4–5.6)
HCT VFR BLD AUTO: 42.2 % (ref 37–47)
HDLC SERPL-MCNC: 76 MG/DL
HGB BLD-MCNC: 13.8 G/DL (ref 12–16)
LDLC SERPL CALC-MCNC: 88 MG/DL
MCH RBC QN AUTO: 31.8 PG (ref 27–33)
MCHC RBC AUTO-ENTMCNC: 32.7 G/DL (ref 32.2–35.5)
MCV RBC AUTO: 97.2 FL (ref 81.4–97.8)
PLATELET # BLD AUTO: 326 K/UL (ref 164–446)
PMV BLD AUTO: 8.8 FL (ref 9–12.9)
POTASSIUM SERPL-SCNC: 4.5 MMOL/L (ref 3.6–5.5)
PROT SERPL-MCNC: 6.7 G/DL (ref 6–8.2)
RBC # BLD AUTO: 4.34 M/UL (ref 4.2–5.4)
SODIUM SERPL-SCNC: 142 MMOL/L (ref 135–145)
TRIGL SERPL-MCNC: 44 MG/DL (ref 0–149)
TSH SERPL DL<=0.005 MIU/L-ACNC: 0.82 UIU/ML (ref 0.38–5.33)
VIT B12 SERPL-MCNC: 331 PG/ML (ref 211–911)
WBC # BLD AUTO: 5.8 K/UL (ref 4.8–10.8)

## 2025-03-24 PROCEDURE — 36415 COLL VENOUS BLD VENIPUNCTURE: CPT

## 2025-03-24 PROCEDURE — 85027 COMPLETE CBC AUTOMATED: CPT

## 2025-03-24 PROCEDURE — 82607 VITAMIN B-12: CPT

## 2025-03-24 PROCEDURE — 80061 LIPID PANEL: CPT

## 2025-03-24 PROCEDURE — 80053 COMPREHEN METABOLIC PANEL: CPT

## 2025-03-24 PROCEDURE — 84443 ASSAY THYROID STIM HORMONE: CPT

## 2025-03-24 PROCEDURE — 82306 VITAMIN D 25 HYDROXY: CPT

## 2025-03-24 PROCEDURE — 83036 HEMOGLOBIN GLYCOSYLATED A1C: CPT

## 2025-04-28 ENCOUNTER — GYNECOLOGY VISIT (OUTPATIENT)
Dept: GYNECOLOGY | Facility: CLINIC | Age: 30
End: 2025-04-28
Attending: FAMILY MEDICINE
Payer: COMMERCIAL

## 2025-04-28 VITALS
WEIGHT: 128 LBS | HEART RATE: 69 BPM | DIASTOLIC BLOOD PRESSURE: 75 MMHG | BODY MASS INDEX: 21.33 KG/M2 | SYSTOLIC BLOOD PRESSURE: 112 MMHG | HEIGHT: 65 IN

## 2025-04-28 DIAGNOSIS — N80.9 ENDOMETRIOSIS: ICD-10-CM

## 2025-04-28 DIAGNOSIS — N76.0 ACUTE VAGINITIS: ICD-10-CM

## 2025-04-28 RX ORDER — METRONIDAZOLE 500 MG/1
500 TABLET ORAL
Qty: 14 TABLET | Refills: 0 | Status: SHIPPED | OUTPATIENT
Start: 2025-04-28 | End: 2025-05-05

## 2025-04-28 ASSESSMENT — FIBROSIS 4 INDEX: FIB4 SCORE: 0.58

## 2025-04-28 NOTE — PROGRESS NOTES
Minimally Invasive Gynecologic Surgery Consult       CC: endometriosis       HPI  Devehn Beata Bull is a 29 y.o. female  presenting today for the above.  Patient with confirmed endometriosis and the following symptoms:  - Dysmenorrhea: yes, partially controlled with OCP. Has LLQ pain cramping, worse with ovulation, present the entire month.  - Dyspareunia: yes, partially controlled with OCP  - Dyschezia: no  - Bowel symptoms: no  - Bladder symptoms: no  - Desires future fertility: yes    Previous treatment:  - Medical:   > Nexplanon - irregular periods and pain  > OCP - started 3 months ago, reports that dysmenorrhea and dyspareunia have significantly improved.    - Surgical:   >  Diagnostic laparoscopy, laparoscopic fulguration of  endometriosis, laparoscopic resection of posterior cul-de-sac cystic lesion, diagnostic hysteroscopy. By Nicolasa in .  Pelvic pain improved after surgery, shortly after she got pregnant.  After delivery, symptoms gradually worsened.     Pelvic sx: [facility], [facility], Cs x1    Of note, reports occasional vaginal odor.  Worse after intercourse and periods.  No significant discharge or itching.    Imaging  Pelvic US :   IMPRESSION:     1.  No thickening of the endometrium.  2.  Bilateral simple ovarian cysts.    Menstrual History  Patient's last menstrual period was 04/15/2025 (exact date).  Regular, monthly, normal flow    Gynecologic History  Last pap:  NILM  Hx abnormal pap smears: no  Hx of PID/STDs: no  Contraception plan: OCP      OB History    Para Term  AB Living   1 1 1   1   SAB IAB Ectopic Molar Multiple Live Births       0 1      # Outcome Date GA Lbr Paul/2nd Weight Sex Type Anes PTL Lv   1 Term 22 39w6d / 03:29 3.13 kg (6 lb 14.4 oz) F CS-LTranv EPI N MAYUR      Complications: Fetal Intolerance       Past Medical History  Past Medical History:   Diagnosis Date    Anxiety     Asthma     Biliary dyskinesia 2019    Depression      anxiety    Gynecological disorder     endometreosis    History of colonoscopy 2015    2012     Intractable migraine without status migrainosus 2015    Labor and delivery, indication for care 2022    Pain 2019    abd., 3-4/10       Past Surgical History  Past Surgical History:   Procedure Laterality Date    PRIMARY C SECTION Bilateral 2022    Procedure:  SECTION, PRIMARY;  Surgeon: Brendan Baldwin M.D.;  Location: [facility] DEL[facility];  Service: Labor and Delivery    GA HYSTEROSCOPY,DX,SEP PROC N/A 2021    Procedure: HYSTEROSCOPY, DIAGNOSTIC;  Surgeon: Brendan Baldwin M.D.;  Location: SURGERY SAME DAY [facility];  Service: Gynecology    GA LAP,DIAGNOSTIC ABDOMEN N/A 2021    Procedure: PELVISCOPY - DIAGNOSTIC,  OPERATIVE, FULGRATION OF ENDOMETRIOSIS,;  Surgeon: Brendan Baldwin M.D.;  Location: SURGERY SAME DAY [facility];  Service: Gynecology    TRISH BY LAPAROSCOPY N/A 2019    Procedure: CHOLECYSTECTOMY, LAPAROSCOPIC;  Surgeon: Kar Jacobsen M.D.;  Location: SURGERY SAME DAY [facility];  Service: General    CHOLECYSTECTOMY  2019    COLONOSCOPY         Social History  Social History     Tobacco Use    Smoking status: Never    Smokeless tobacco: Never   Vaping Use    Vaping status: Never Used   Substance Use Topics    Alcohol use: Not Currently     Comment: rare    Drug use: Not Currently     Frequency: 7.0 times per week     Types: Marijuana     Comment: marijuana        Family History  Family History   Problem Relation Age of Onset    Hyperlipidemia Mother     Hyperlipidemia Father     Hypertension Father     Alcohol/Drug Father     Drug abuse Father     Alcohol abuse Father     Psychiatric Illness Brother     Alcohol/Drug Brother     Lung Disease Maternal Grandmother     Cancer Maternal Grandmother     Alcohol abuse Maternal Grandmother     Hypertension Maternal Grandfather     Cancer Maternal Grandfather     Alcohol abuse Maternal Grandfather   "      Home Medications  Current Outpatient Medications   Medication Sig    metroNIDAZOLE (FLAGYL) 500 MG Tab Take 1 Tablet by mouth 2 (two) times a day for 7 days. Avoid alcohol while using.    NATAZIA 3/2-2/2-3/1 MG Tab Take 1 Tablet by mouth every day.       Allergies/Reactions  No Known Allergies       ROS: I have reviewed all systems with patient. Pertinent positive and negative findings are listed below except for what is otherwise stated in the History of Present Illness.       Objective:    Labs  Lab Results   Component Value Date/Time    WBC 5.8 03/24/2025 08:38 AM    RBC 4.34 03/24/2025 08:38 AM    HEMOGLOBIN 13.8 03/24/2025 08:38 AM    HEMATOCRIT 42.2 03/24/2025 08:38 AM    MCV 97.2 03/24/2025 08:38 AM    MCH 31.8 03/24/2025 08:38 AM    MCHC 32.7 03/24/2025 08:38 AM    RDW 43.8 03/24/2025 08:38 AM    PLATELETCT 326 03/24/2025 08:38 AM    MPV 8.8 (L) 03/24/2025 08:38 AM    NEUTSPOLYS 76.70 (H) 12/05/2024 08:40 AM    LYMPHOCYTES 17.50 (L) 12/05/2024 08:40 AM    MONOCYTES 5.00 12/05/2024 08:40 AM    EOSINOPHILS 0.10 12/05/2024 08:40 AM    BASOPHILS 0.50 12/05/2024 08:40 AM    IMMGRAN 0.20 12/05/2024 08:40 AM    NRBC 0.00 12/05/2024 08:40 AM    NEUTS 7.73 (H) 12/05/2024 08:40 AM    LYMPHS 1.76 12/05/2024 08:40 AM    MONOS 0.50 12/05/2024 08:40 AM    EOS 0.01 12/05/2024 08:40 AM    BASO 0.05 12/05/2024 08:40 AM    IMMGRANAB 0.02 12/05/2024 08:40 AM    NRBCAB 0.00 12/05/2024 08:40 AM       Lab Results   Component Value Date/Time    HBA1C 5.5 03/24/2025 08:38 AM         Physical Exam  /75 (BP Location: Left arm, Patient Position: Sitting, BP Cuff Size: Adult)   Pulse 69   Ht 1.651 m (5' 5\")   Wt 58.1 kg (128 lb)   LMP 04/15/2025 (Exact Date)   BMI 21.30 kg/m²    Patient's last menstrual period was 04/15/2025 (exact date).  Body mass index is 21.3 kg/m².    General: alert, well appearing, and in no distress  Neurological: alert, oriented, normal speech, no focal findings or movement disorder " noted  Respiratory: no tachypnea, retractions or cyanosis  Abdominal: soft, nontender, nondistended, no masses or organomegaly    Pelvic exam:   external genitalia: normal appearance  urinary system: urethral meatus normal  vaginal: normal mucosa without prolapse or lesions  pelvic floor: nontender   cervix: normal appearance  adnexa: Left lower quadrant mildly tender to palpation  uterus: normal size , normal contour , mobile, non-tender    Female chaperone present - see MA note         Assessment:  29-year-old P1  Endometriosis confirmed by surgery, status post resection  Dysmenorrhea, dyspareunia  Symptoms mostly controlled with OCP  Desires future fertility  Normal imaging     Plan:  1. Endometriosis  Discussed with the patient the diagnosis of endometriosis: the familial inheritance, progressive nature of the disease, possible associated infertility, diagnosis by laparoscopy and limitations of imaging. Reviewed with patient that endometriosis should be viewed as a chronic disease that requires a lifelong management plan with the goal of maximizing the use of medical treatment and avoiding repeated surgical procedures. Reviewed typical treatment options including medical management (OCPs, POPs, Lupron, Orlissa) and surgical options (excision of lesions, hysterectomy). The first line therapy for endometriosis is hormonal suppression and approximately 70% of women experience improvement in their pain with hormonal suppression. Reviewed that medical interventions do not improve fertility, diminish endometriomas, or treat complications of deep endometriosis such as ureteral obstruction. Surgical therapy is usually only considered when hormonal suppression is not effective, when it is not an option, or when there is a persistent pelvic mass suggestive of a large ovarian endometriotic cyst.     Surgical therapy with excision of endometriosis provides pain improvement for approximately 70% of women, but the risk of  recurrent pain approaches 50% 2-4 years after surgery. Repetitive surgeries tend to be less successful than the initial surgery for the treatment of pain. Finally, we also discussed that even when endometriosis is identified in women with pelvic pain, it is often not the only cause of pain and other possible sources of pain should be identified and treated.     We also reviewed her future fertility plans; she desires to maintain her fertility.  As her symptoms are mostly controlled with OCP and she desires future fertility, she has decided to proceed with medical management and continuation of OCP.  If worsening symptoms, she she should return to see me.        2. Acute vaginitis  - metroNIDAZOLE (FLAGYL) 500 MG Tab; Take 1 Tablet by mouth 2 (two) times a day for 7 days. Avoid alcohol while using.  Dispense: 14 Tablet; Refill: 0         Carmina Presley MD

## 2025-04-28 NOTE — PROGRESS NOTES
Pt here as a NP Consult for Endometriosis Management.   LOV with Shayna Adair 01.17.24  Pt states she is having occ abn bleeding with painful periods. Pt states she has been having sweaty-like smelling discharge.   LMP: 04.15.25; Lasting 3 days with light bleeding.  Last Pap: 02.07.24 neg with NO HX of abn pap's.  U/S 07.04.24  Cx Hx: No  Hx STD: No  BC: No  Good # [phone]  Pharmacy Verified

## (undated) DEVICE — SUTURE 4-0 VICRYL PLUSFS-1 - 27 INCH (36/BX)

## (undated) DEVICE — HEAD HOLDER JUNIOR/ADULT

## (undated) DEVICE — CANISTER SUCTION RIGID RED 1500CC (40EA/CA)

## (undated) DEVICE — CANNULA W/SEAL 5X100 Z-THRE - ADED KII (12/BX)

## (undated) DEVICE — SODIUM CHL IRRIGATION 0.9% 1000ML (12EA/CA)

## (undated) DEVICE — SET EXTENSION WITH 2 PORTS (48EA/CA) ***PART #2C8610 IS A SUBSTITUTE*****

## (undated) DEVICE — GOWN WARMING STANDARD FLEX - (30/CA)

## (undated) DEVICE — KIT  I.V. START (100EA/CA)

## (undated) DEVICE — GLOVE BIOGEL SZ 8 SURGICAL PF LTX - (50PR/BX 4BX/CA)

## (undated) DEVICE — ELECTRODE DUAL RETURN W/ CORD - (50/PK)

## (undated) DEVICE — STERI STRIP COMPOUND BENZOIN - TINCTURE 0.6ML WITH APPLICATOR (40EA/BX)

## (undated) DEVICE — TROCAR 5X100 BLADED Z-THREAD - KII (6/BX)

## (undated) DEVICE — SUTURE 0 VICRYL PLUS CT-2 - 27 INCH (36/BX)

## (undated) DEVICE — NEEDLE INSUFFLATION FOR STEP - (12/BX)

## (undated) DEVICE — GLOVE BIOGEL PI INDICATOR SZ 7.0 SURGICAL PF LF - (50/BX 4BX/CA)

## (undated) DEVICE — CATHETER IV 20 GA X 1-1/4 ---SURG.& SDS ONLY--- (50EA/BX)

## (undated) DEVICE — SLEEVE, SEQUENTIAL CALF REG

## (undated) DEVICE — DRAPE UNDER BUTTOCKS FLUID - (20/CA)

## (undated) DEVICE — SUTURE GENERAL

## (undated) DEVICE — LACTATED RINGERS INJ 1000 ML - (14EA/CA 60CA/PF)

## (undated) DEVICE — TAPE CLOTH MEDIPORE 6 INCH - (12RL/CA)

## (undated) DEVICE — CLIP MED LG INTNL HRZN TI ESCP - (20/BX)

## (undated) DEVICE — PENCIL ELECTSURG 10FT HLSTR - WITH BLADE (50EA/CA)

## (undated) DEVICE — GLOVE BIOGEL SZ 7.5 SURGICAL PF LTX - (50PR/BX 4BX/CA)

## (undated) DEVICE — SUTURE 0 VICRYL PLUS CT-1 - 36 INCH (36/BX)

## (undated) DEVICE — SUTURE 4-0 VICRYL PLUS FS-2 - 27 INCH (36/BX)

## (undated) DEVICE — BANDAID SHEER STRIP 3/4 IN (100EA/BX 12BX/CA)

## (undated) DEVICE — SET LEADWIRE 5 LEAD BEDSIDE DISPOSABLE ECG (1SET OF 5/EA)

## (undated) DEVICE — KIT ANESTHESIA W/CIRCUIT & 3/LT BAG W/FILTER (20EA/CA)

## (undated) DEVICE — CHLORAPREP 26 ML APPLICATOR - ORANGE TINT(25/CA)

## (undated) DEVICE — TUBE CONNECTING SUCTION - CLEAR PLASTIC STERILE 72 IN (50EA/CA)

## (undated) DEVICE — CANISTER SUCTION 3000ML MECHANICAL FILTER AUTO SHUTOFF MEDI-VAC NONSTERILE LF DISP  (40EA/CA)

## (undated) DEVICE — ELECTRODE 850 FOAM ADHESIVE - HYDROGEL RADIOTRNSPRNT (50/PK)

## (undated) DEVICE — TUBE NG SALEM SUMP 16FR (50EA/CA)

## (undated) DEVICE — SET IRRIGATION CYSTOSCOPY TUBE L80 IN (20EA/CA)

## (undated) DEVICE — SLEEVE, VASO, THIGH, MED

## (undated) DEVICE — TUBING D & E COLLECTION SET (50EA/PK)

## (undated) DEVICE — WATER IRRIGATION STERILE 1000ML (12EA/CA)

## (undated) DEVICE — GLOVE SZ 6.5 BIOGEL PI MICRO - PF LF (50PR/BX)

## (undated) DEVICE — GOWN SURGEONS X-LARGE - DISP. (30/CA)

## (undated) DEVICE — SLEEVE VASO CALF MED - (10PR/CA)

## (undated) DEVICE — SENSOR SPO2 NEO LNCS ADHESIVE (20/BX) SEE USER NOTES

## (undated) DEVICE — GLOVE BIOGEL PI INDICATOR SZ 6.5 SURGICAL PF LF - (50/BX 4BX/CA)

## (undated) DEVICE — BLANKET UNDERBODY FULL ACCES - (5/CA)

## (undated) DEVICE — MASK ANESTHESIA ADULT  - (100/CA)

## (undated) DEVICE — CLOSURE SKIN STRIP 1/2 X 4 IN - (STERI STRIP) (50/BX 4BX/CA)

## (undated) DEVICE — TRAY SRGPRP PVP IOD WT PRP - (20/CA)

## (undated) DEVICE — SUCTION INSTRUMENT YANKAUER BULBOUS TIP W/O VENT (50EA/CA)

## (undated) DEVICE — TUBING CLEARLINK DUO-VENT - C-FLO (48EA/CA)

## (undated) DEVICE — SUTURE 2-0 VICRYL PLUS CT-1 36 (36PK/BX)"

## (undated) DEVICE — TOWEL STOP TIMEOUT SAFETY FLAG (40EA/CA)

## (undated) DEVICE — PACK C-SECTION (2EA/CA)

## (undated) DEVICE — GLOVESZ 8.5 BIOGEL PI MICRO - PF LF (50PR/BX)

## (undated) DEVICE — TROCAR Z THREAD 11 X 100 - BLADED (6/BX)

## (undated) DEVICE — KIT D & C COLLECTION (10EA/PK)

## (undated) DEVICE — ARMREST CRADLE FOAM - (2PR/PK 12PR/CA)

## (undated) DEVICE — SOLUTION PLASMA-LYTE PH 7.4 INJ 1000ML  (14EA/CA)

## (undated) DEVICE — PACK LAPAROSCOPY - (1/CA)

## (undated) DEVICE — GLOVE SZ 7 BIOGEL PI MICRO - PF LF (50PR/BX 4BX/CA)

## (undated) DEVICE — PAD SANITARY 11IN MAXI IND WRAPPED  (12EA/PK 24PK/CA)

## (undated) DEVICE — CATHETER IV NON-SAFETY 18 GA X 1 1/4 (50/BX 4BX/CA)

## (undated) DEVICE — PROTECTOR ULNA NERVE - (36PR/CA)

## (undated) DEVICE — PACK MINOR BASIN - (2EA/CA)

## (undated) DEVICE — TROCAR STEP 5MM - (3/CA)

## (undated) DEVICE — BANDAID X-LARGE 2 X 4 IN LF (50EA/BX)

## (undated) DEVICE — NEEDLE SPINAL NON-SAFETY 22 GA X 3 (25EA/BX)"

## (undated) DEVICE — DRESSING TRANSPARENT FILM TEGADERM 2.375 X 2.75"  (100EA/BX)"

## (undated) DEVICE — GLOVE BIOGEL ECLIPSE  PF LATEX SIZE 6.5 (50PR/BX)

## (undated) DEVICE — PACK ROOM TURNOVER L&D (12/CA)

## (undated) DEVICE — BAG RETRIEVAL 10ML (10EA/BX)

## (undated) DEVICE — NEPTUNE 4 PORT MANIFOLD - (20/PK)

## (undated) DEVICE — SET SUCTION/IRRIGATION WITH DISPOSABLE TIP (6/CA )PART #0250-070-520 IS A SUB

## (undated) DEVICE — SUTURE 1 CHROMIC CTX ETHICON - (36PK/BX)

## (undated) DEVICE — SPONGE GAUZESTER. 2X2 4-PL - (2/PK 50PK/BX 30BX/CS)

## (undated) DEVICE — TRAY SPINAL ANESTHESIA NON-SAFETY (10/CA)

## (undated) DEVICE — PACK LAP CHOLE OR - (2EA/CA)

## (undated) DEVICE — SOLUTION SORBITOL 3000ML (4/CA)

## (undated) DEVICE — GOWN SURGEONS LARGE - (32/CA)